# Patient Record
Sex: MALE | Race: WHITE | Employment: OTHER | URBAN - METROPOLITAN AREA
[De-identification: names, ages, dates, MRNs, and addresses within clinical notes are randomized per-mention and may not be internally consistent; named-entity substitution may affect disease eponyms.]

---

## 2017-11-20 ENCOUNTER — HOSPITAL ENCOUNTER (OUTPATIENT)
Dept: PREADMISSION TESTING | Age: 62
Discharge: HOME | End: 2017-11-20
Payer: COMMERCIAL

## 2017-11-20 VITALS
OXYGEN SATURATION: 98 % | WEIGHT: 253.31 LBS | HEIGHT: 73 IN | SYSTOLIC BLOOD PRESSURE: 165 MMHG | HEART RATE: 68 BPM | BODY MASS INDEX: 33.57 KG/M2 | DIASTOLIC BLOOD PRESSURE: 95 MMHG | TEMPERATURE: 98 F | RESPIRATION RATE: 20 BRPM

## 2017-11-20 LAB
ANION GAP SERPL CALC-SCNC: 9 MMOL/L (ref 5–15)
ATRIAL RATE: 60 BPM
BUN SERPL-MCNC: 10 MG/DL (ref 6–20)
BUN/CREAT SERPL: 11 (ref 12–20)
CALCIUM SERPL-MCNC: 9.6 MG/DL (ref 8.5–10.1)
CALCULATED P AXIS, ECG09: 55 DEGREES
CALCULATED R AXIS, ECG10: 11 DEGREES
CALCULATED T AXIS, ECG11: 13 DEGREES
CHLORIDE SERPL-SCNC: 103 MMOL/L (ref 97–108)
CO2 SERPL-SCNC: 28 MMOL/L (ref 21–32)
CREAT SERPL-MCNC: 0.91 MG/DL (ref 0.7–1.3)
DIAGNOSIS, 93000: NORMAL
GLUCOSE SERPL-MCNC: 93 MG/DL (ref 65–100)
P-R INTERVAL, ECG05: 142 MS
POTASSIUM SERPL-SCNC: 3.9 MMOL/L (ref 3.5–5.1)
Q-T INTERVAL, ECG07: 430 MS
QRS DURATION, ECG06: 98 MS
QTC CALCULATION (BEZET), ECG08: 430 MS
SODIUM SERPL-SCNC: 140 MMOL/L (ref 136–145)
VENTRICULAR RATE, ECG03: 60 BPM

## 2017-11-20 PROCEDURE — 36415 COLL VENOUS BLD VENIPUNCTURE: CPT | Performed by: ORTHOPAEDIC SURGERY

## 2017-11-20 PROCEDURE — 93005 ELECTROCARDIOGRAM TRACING: CPT

## 2017-11-20 PROCEDURE — 80048 BASIC METABOLIC PNL TOTAL CA: CPT | Performed by: ORTHOPAEDIC SURGERY

## 2017-11-20 RX ORDER — ASPIRIN 81 MG/1
81 TABLET ORAL DAILY
COMMUNITY
End: 2020-07-21

## 2017-11-20 NOTE — H&P
PAT Pre-Op History & Physical    Patient: Mercy Velez                  MRN: [id number]          SSN: [id number]  YOB: 1955          Age: 63 y.o. Sex: male                Subjective:     Patient is a 63 y.o.  male who presents with history of right knee pain that started September 2017. Patient is a  and was \"crab walking\" under his airplane to tie it down. He states when he emerged from under the plane his right knee did not \"feel right\". The pain continued to get worse and now rates his pain around a 4/10 most of the time. He describes the pain as aching. The pain is worse when he attempts to walk and has to take three steps before he feels like his knee will proceed  The patient was evaluated in the surgeon's office and it was determined that the most appropriate plan of care is to proceed with surgical intervention. Patient's PCP Darrius Mcguire MD            Past Medical History:   Diagnosis Date    Abnormal echocardiogram 10/28/2010    Gout     Lone atrial fibrillation ([address]     One epsodes many years ago    Obesity 10/28/2010    Supraventricular tachycardia ([address] 10/28/2010      Past Surgical History:   Procedure Laterality Date    CARDIAC SURG PROCEDURE UNLIST  2016    cardiac ablation    HX ACL RECONSTRUCTION  6/2013    menniscus    HX ROTATOR CUFF REPAIR Left 2005    HX ROTATOR CUFF REPAIR Right 2007    HX WISDOM TEETH EXTRACTION        Prior to Admission medications    Medication Sig Start Date End Date Taking? Authorizing Provider   aspirin delayed-release 81 mg tablet Take 81 mg by mouth daily. Yes Historical Provider   febuxostat (ULORIC) 80 mg tab tablet Take 80 mg by mouth daily. Historical Provider     Current Outpatient Prescriptions   Medication Sig    aspirin delayed-release 81 mg tablet Take 81 mg by mouth daily.  febuxostat (ULORIC) 80 mg tab tablet Take 80 mg by mouth daily.      No current facility-administered medications for this encounter. Allergies   Allergen Reactions    Percocet [Oxycodone-Acetaminophen] Other (comments)     Skin crawling      Social History   Substance Use Topics    Smoking status: Never Smoker    Smokeless tobacco: Never Used    Alcohol use 9.6 oz/week     16 Cans of beer per week      Comment: reports not drinking during the week, only on weekends      History   Drug Use No     Family History   Problem Relation Age of Onset    No Known Problems Mother     No Known Problems Father     Cancer Sister 43     breast    No Known Problems Sister          Review of Systems    Patient denies difficulty swallowing, mouth sores, or loose teeth. Patient denies any recent dental procedures or any planned prior to surgery. Patient denies chest pain, tightness, pain radiating down left arm, palpitations. Denies dizziness, visual disturbances, or lightheadedness. Patient denies shortness of breath, wheezing, cough, fever, or chills. Patient denies diarrhea, constipation, or abdominal pain. Patient denies urinary problems including dysuria, hesitancy, urgency, or incontinence. Reports numerous small areas on bilateral shins r/t dry skin. Objective:     Patient Vitals for the past 24 hrs:   Temp Pulse Resp BP SpO2   17 0931 - - - (!) 165/95 -   17 0911 98 °F (36.7 °C) 68 20 (!) 181/97 98 %     Patient gets very anxious prior to any lab draws or IV insertions. Temp (24hrs), Av °F (36.7 °C), Min:98 °F (36.7 °C), Max:98 °F (36.7 °C)    Body mass index is 33.42 kg/(m^2). Wt Readings from Last 1 Encounters:   17 114.9 kg (253 lb 4.9 oz)        Physical Exam:     General: Pleasant,  cooperative, no apparent distress, appears stated age. Eyes: Conjunctivae/corneas clear. EOMs intact. Nose: Nares normal.   Mouth/Throat: Lips, mucosa, and tongue normal. Teeth and gums normal.   Lungs: Clear to auscultation bilaterally.    Heart: Regular rate and rhythm, S1, S2 normal. No murmur, click, rub or gallop. Abdomen: Soft, non-tender. Bowel sounds normal. No distention. Musculoskeletal:  Gait antalgic   Extremities:  Extremities normal, atraumatic, no cyanosis or edema. Calves                                 supple, non tender to palpation. Pulses: 2+ and symmetric bilateral upper extremities. Cap. refill <2 seconds   Skin: Numerous dime size to smaller areas to left and right shin, scab present   Neurologic: CN II-XII grossly intact. Alert and oriented x3. Labs:   Recent Results (from the past 72 hour(s))   EKG, 12 LEAD, INITIAL    Collection Time: 11/20/17 10:16 AM   Result Value Ref Range    Ventricular Rate 60 BPM    Atrial Rate 60 BPM    P-R Interval 142 ms    QRS Duration 98 ms    Q-T Interval 430 ms    QTC Calculation (Bezet) 430 ms    Calculated P Axis 55 degrees    Calculated R Axis 11 degrees    Calculated T Axis 13 degrees    Diagnosis Normal sinus rhythm  Normal ECG          Assessment:     MEDIAL MENISCAL TEAR    Plan:     Scheduled for RIGHT KNEE ARTHROSCOPY, MEDIAL MENSICECTOMY .     BMP and EKG pending    Jaison Vasquez, NP

## 2017-11-20 NOTE — PERIOP NOTES
[facility]  PREOPERATIVE INSTRUCTIONS    Surgery Date:   Thursday 11/30/17      Surgery arrival time given by surgeon: NO  (If [facility] staff will call you between 3pm - 7pm the day before surgery with your arrival time. If your surgery is on a Monday, we will call you the preceding Friday. Please call [phone] after 7pm if you did not receive your arrival time.)  1. Report  to the 2nd Floor Admitting Desk on the day of your surgery. Bring your insurance card, photo identification, and any copayment (if applicable). 2. You must have a responsible adult to drive you home and stay with you the first 24 hours after surgery if you are going home the same day of your surgery. 3. Nothing to eat or drink after midnight the night before surgery. This means NO water, gum, mints, coffee, juice, etc.    4. MEDICATIONS TO TAKE THE MORNING OF SURGERY WITH A SIP OF WATER:uloric. Contact Dr. Tyron Bender concerning when to stop aspirin. 5. No alcoholic beverages 24 hours before and after your surgery. 6. If you are being admitted to the hospital,please leave personal belongings/luggage in your car until you have an assigned hospital room number. ( The hospital discharge time is 12 PM NOON. Your adult  should be at the hospital prior to the noon discharge time unless otherwise instructed.)   7. STOP Aspirin and/or any non-steroidal anti-inflammatory drugs (i.e. Ibuprofen, Naproxen, Advil, Aleve) as directed by your surgeon. You may take Tylenol. Stop herbal supplements 1 week prior to  surgery. 8. If you are currently taking Plavix, Coumadin,or any other blood-thinning/ anticoagulant medication contact your surgeon for instructions. 9. Wear comfortable clothes. Wear your glasses instead of contacts. Please leave all money, jewelry and valuables at home. No make up, particularly mascara, the day of surgery. 10.  REMOVE ALL body piercings, rings,and jewelry and leave at home.   Wear your hair loose or down, no pony-tails, buns, or any metal hair clips. 11. If you shower the morning of surgery, please do not apply any lotions, powders, or deodorants afterwards. Do not shave any body area within 24 hours of your surgery. 12. Please follow all instructions to avoid any potential surgical cancellation. 13. Should your physical condition change, (i.e. fever, cold, flu, etc.) please notify your surgeon as soon as possible. 14. It is important to be on time. If a situation occurs where you may be delayed, please call:  [phone] / [phone] on the day of surgery. 15. The Preadmission [facility] staff can be reached at [phone]. 16. Special instructions: free  parking 7am-5pm  17. The patient was contacted  in person. He  verbalize  understanding of all instructions does not  need reinforcement.

## 2017-11-28 PROBLEM — S83.241A ACUTE MEDIAL MENISCUS TEAR, RIGHT, INITIAL ENCOUNTER: Status: ACTIVE | Noted: 2017-11-28

## 2017-11-28 NOTE — DISCHARGE INSTRUCTIONS
DR. [place] POST OPERATIVE INTRUCTIONS   FOLLOWING ARTHROSCOPIC SURGERY OF KNEE    To continue your care at home, please follow these guidelines from Dr. Randall Kehr:    1. First meal at home should be a light meal then return to regular diet as tolerated. Drink plenty of liquids. 2. An Active Ice wrap will be provided for your knee. Re-freeze the wrap in your home freezer as necessary. Wear it for the first 2-3 days to decrease swelling and pain, then use as desired. 3. A compression stocking or large ace wrap will be applied to your leg postoperatively. This is to help prevent blood clots and control swelling. You may remove it to shower and do bandage changes, but continue use at all other times for 7 days, or until sutures are removed. 4. Elevate the surgical leg when sitting and sleeping to reduce swelling. DO NOT place a pillow directly under the knee; place it under your ankle. 5. Practice quadriceps muscle tightening, straight leg raise exercises, and ankle pumps several times each hour. Bend your knee as often as you need to for comfort. 6. Let pain be your guide to activity; too much pain, too much activity. 7. You will be given crutches postop if you do not have any already. Begin partial weight bearing as tolerated with 2 crutches, flat foot on the ground with each step. Continue crutch use for 7-10 days, or until cleared by physical therapy. 8. Prescriptions for pain and inflammation will be provided. Please begin medications as instructed as soon as you arrive home. Use Tylenol for headaches. Avoid ibuprofen and other NSAIDs 1 week prior to surgery. Please inform us of any known drug allergies. 9. You may shower 48 hours after surgery. Change band-aids after your daily shower. If you have steri-strips, DO NOT remove them until you are seen in the office.     10. You were given a follow up appointment date in 7-10 days for suture removal.  Appointment dates and times are located on the yellow POST-OP sheet. If you cannot remember the date/time, or you need to reschedule, please call the office. 11. DO NOT swim, take a tub bath or whirlpool, or immerse your wound until after your sutures are removed and a wound check is completed in the office. 12. If you develop a fever greater than 101°, or have unexpected redness, swelling, or drainage in your lower extremity, please call the office immediately. 13. REMINDER: Schedule your first physical therapy visit as instructed (yellow sheet). You will receive your physical therapy prescription the day of surgery. Remember to take your prescription with you to your first visit. 14. DRIVING: You may not drive within 48 hours of surgery. Left leg surgery, you may drive once you are off of pain medication. Right leg surgery, you may drive one week postop once you are off crutches and off of pain medication. DISCHARGE SUMMARY from your Nurse      PATIENT INSTRUCTIONS    After general anesthesia or intravenous sedation, for 24 hours or while taking prescription Narcotics:  · Limit your activities  · Do not drive and operate hazardous machinery  · Do not make important personal or business decisions  · Do  not drink alcoholic beverages  · If you have not urinated within 8 hours after discharge, please contact your surgeon on call. Report the following to your surgeon:  · Excessive pain, swelling, redness or odor of or around the surgical area  · Temperature over 100.5  · Nausea and vomiting lasting longer than 4 hours or if unable to take medications  · Any signs of decreased circulation or nerve impairment to extremity: change in color, persistent  numbness, tingling, coldness or increase pain  · Any questions      COUGH AND DEEP BREATHE    Breathing deeply and coughing are very important exercises to do after surgery. Deep breathing and coughing open the little air tubes and air sacks in your lungs. You take deep breaths every day. You may not even notice - it is just something you do when you sigh or yawn. It is a natural exercise you do to keep these air passages open. After surgery, take deep breaths and cough, on purpose. DIRECTIONS:  · Take 10 to 15 slow deep breaths every hour while awake. · Breathe in deeply, and hold it for 2 seconds. · Exhale slowly through puckered lips, like blowing up a balloon. · After every 4th or 5th deep breath, hug your pillow to your chest or belly and give a hard, deep cough. Yes, it will probably hurt. But doing this exercise is a very important part of healing after surgery. Take your pain medicine to help you do this exercise without too much pain. Coughing and deep breathing help prevent bronchitis and pneumonia after surgery. If you had chest or belly surgery, use a pillow as a \"hug lawrence\" and hold it tightly to your chest or belly when you cough. ANKLE PUMPS    Ankle pumps increase the circulation of oxygenated blood to your lower extremities and decrease your risk for circulation problems such as blood clots. They also stretch the muscles, tendons and ligaments in your foot and ankle, and prevent joint contracture in the ankle and foot, especially after surgeries on the legs. It is important to do ankle pump exercises regularly after surgery because immobility increases your risk for developing a blood clot. Your doctor may also have you take an Aspirin for the next few days as well. If your doctor did not ask you to take an Aspirin, consult with him before starting Aspirin therapy on your own. The exercise is quite simple. · Slowly point your foot forward, feeling the muscles on the top of your lower leg stretch, and hold this position for 5 seconds. · Next, pull your foot back toward you as far as possible, stretching the calf muscles, and hold that position for 5 seconds.                  · Repeat with the other foot. · Perform 10 repetitions every hour while awake for both ankles if possible (down and then up with the foot once is one repetition). You should feel gentle stretching of the muscles in your lower leg when doing this exercise. If you feel pain, or your range of motion is limited, don't push too hard. Only go the limit your joint and muscles will let you go. If you have increasing pain, progressively worsening leg warmth or swelling, STOP the exercise and call your doctor. These are general instructions for a healthy lifestyle:    *   Please give a list of your current medications to your Primary Care Provider. *   Please update this list whenever your medications are discontinued, doses are changed, or new medications (including over-the-counter products) are added. *   Please carry medication information at all times in case of emergency situations. About Smoking  No smoking / No tobacco products  Avoid exposure to second hand smoke     Surgeon General's Warning:  Quitting smoking now greatly reduces serious risk to your health. Obesity, smoking, and sedentary lifestyle greatly increases your risk for illness and disease. A healthy diet, regular physical exercise & weight monitoring are important for maintaining a healthy lifestyle. Congestive Heart Failure  You may be retaining fluid if you have a history of heart failure or if you experience any of the following symptoms:  Weight gain of 3 pounds or more overnight or 5 pounds in a week, increased swelling in your hands or feet or shortness of breath while lying flat in bed. Please call your doctor as soon as you notice any of these symptoms; do not wait until your next office visit.       Recognize signs and symptoms of STROKE:  F -  Face looks uneven  A -  Arms unable to move or move evenly  S -  Speech slurred or non-existent  T -  Time-call 911 as soon as signs and symptoms begin-DO NOT go          back to bed or wait to see if you get better-TIME IS BRAIN. Warning Signs of HEART ATTACK   Call 911 if you have these symptoms:     Chest discomfort. Most heart attacks involve discomfort in the center of the chest that lasts more than a few minutes, or that goes away and comes back. It can feel like uncomfortable pressure, squeezing, fullness, or pain.  Discomfort in other areas of the upper body. Symptoms can include pain or discomfort in one or both arms, the back, neck, jaw, or stomach.  Shortness of breath with or without chest discomfort.  Other signs may include breaking out in a cold sweat, nausea, or lightheadedness. Don't wait more than five minutes to call 911 - MINUTES MATTER! Fast action can save your life. Calling 911 is almost always the fastest way to get lifesaving treatment. Emergency Medical Services staff can begin treatment when they arrive -- up to an hour sooner than if someone gets to the hospital by car. The discharge information has been reviewed with the patient and caregiver. Any questions and concerns from the patient and caregiver have been addressed. The patient and caregiver verbalized understanding. Other information in your discharge envelope:  []     PRESCRIPTIONS  [x]     PHYSICAL THERAPY PRESCRIPTION  []     APPOINTMENT CARDS  []     [facility] Pamphlet for block or block with On-Q Catheter from   Anesthesia Service  []     Medical device information sheets/pamphlets from their    []     School/work excuse note. []     /parent work excuse note. The following personal items collected during your admission are returned to you:   Dental Appliance: Dental Appliances: None  Vision:    Hearing Aid:    Jewelry: Jewelry: None  Clothing: Clothing: Footwear, Undergarments, Pants, Shirt  Other Valuables:  Other Valuables: None  Valuables sent to safe:

## 2017-11-29 ENCOUNTER — ANESTHESIA EVENT (OUTPATIENT)
Dept: SURGERY | Age: 62
End: 2017-11-29
Payer: COMMERCIAL

## 2017-11-29 RX ORDER — NALOXONE HYDROCHLORIDE 0.4 MG/ML
0.2 INJECTION, SOLUTION INTRAMUSCULAR; INTRAVENOUS; SUBCUTANEOUS
Status: CANCELLED | OUTPATIENT
Start: 2017-11-29

## 2017-11-29 RX ORDER — SODIUM CHLORIDE 0.9 % (FLUSH) 0.9 %
5-10 SYRINGE (ML) INJECTION EVERY 8 HOURS
Status: CANCELLED | OUTPATIENT
Start: 2017-11-29

## 2017-11-29 RX ORDER — FLUMAZENIL 0.1 MG/ML
0.2 INJECTION INTRAVENOUS
Status: CANCELLED | OUTPATIENT
Start: 2017-11-29

## 2017-11-29 RX ORDER — SODIUM CHLORIDE, SODIUM LACTATE, POTASSIUM CHLORIDE, CALCIUM CHLORIDE 600; 310; 30; 20 MG/100ML; MG/100ML; MG/100ML; MG/100ML
125 INJECTION, SOLUTION INTRAVENOUS CONTINUOUS
Status: CANCELLED | OUTPATIENT
Start: 2017-11-29 | End: 2017-11-30

## 2017-11-29 RX ORDER — SODIUM CHLORIDE 0.9 % (FLUSH) 0.9 %
5-10 SYRINGE (ML) INJECTION AS NEEDED
Status: CANCELLED | OUTPATIENT
Start: 2017-11-29

## 2017-11-29 RX ORDER — LIDOCAINE HYDROCHLORIDE 10 MG/ML
0.1 INJECTION, SOLUTION EPIDURAL; INFILTRATION; INTRACAUDAL; PERINEURAL AS NEEDED
Status: CANCELLED | OUTPATIENT
Start: 2017-11-29

## 2017-11-30 ENCOUNTER — ANESTHESIA (OUTPATIENT)
Dept: SURGERY | Age: 62
End: 2017-11-30
Payer: COMMERCIAL

## 2017-11-30 ENCOUNTER — HOSPITAL ENCOUNTER (OUTPATIENT)
Age: 62
Setting detail: OUTPATIENT SURGERY
Discharge: HOME | End: 2017-11-30
Attending: ORTHOPAEDIC SURGERY | Admitting: ORTHOPAEDIC SURGERY
Payer: COMMERCIAL

## 2017-11-30 VITALS
BODY MASS INDEX: 33.1 KG/M2 | DIASTOLIC BLOOD PRESSURE: 85 MMHG | OXYGEN SATURATION: 96 % | HEART RATE: 64 BPM | TEMPERATURE: 97.4 F | WEIGHT: 249.78 LBS | HEIGHT: 73 IN | RESPIRATION RATE: 11 BRPM | SYSTOLIC BLOOD PRESSURE: 117 MMHG

## 2017-11-30 PROCEDURE — 77030008495 HC TBNG ARTHSC IRR CNMD -B: Performed by: ORTHOPAEDIC SURGERY

## 2017-11-30 PROCEDURE — 77030006877 HC BLD SHV FLL RAD S&N -B: Performed by: ORTHOPAEDIC SURGERY

## 2017-11-30 PROCEDURE — 77030002916 HC SUT ETHLN J&J -A: Performed by: ORTHOPAEDIC SURGERY

## 2017-11-30 PROCEDURE — 76060000061 HC AMB SURG ANES 0.5 TO 1 HR: Performed by: ORTHOPAEDIC SURGERY

## 2017-11-30 PROCEDURE — 77030012893

## 2017-11-30 PROCEDURE — 77030011640 HC PAD GRND REM COVD -A: Performed by: ORTHOPAEDIC SURGERY

## 2017-11-30 PROCEDURE — 74011250636 HC RX REV CODE- 250/636: Performed by: ORTHOPAEDIC SURGERY

## 2017-11-30 PROCEDURE — 77030000032 HC CUF TRNQT ZIMM -B: Performed by: ORTHOPAEDIC SURGERY

## 2017-11-30 PROCEDURE — 74011250637 HC RX REV CODE- 250/637: Performed by: ORTHOPAEDIC SURGERY

## 2017-11-30 PROCEDURE — 74011250636 HC RX REV CODE- 250/636

## 2017-11-30 PROCEDURE — 76210000057 HC AMBSU PH II REC 1 TO 1.5 HR: Performed by: ORTHOPAEDIC SURGERY

## 2017-11-30 PROCEDURE — 77030018835 HC SOL IRR LR ICUM -A: Performed by: ORTHOPAEDIC SURGERY

## 2017-11-30 PROCEDURE — 74011000250 HC RX REV CODE- 250: Performed by: ORTHOPAEDIC SURGERY

## 2017-11-30 PROCEDURE — 74011000250 HC RX REV CODE- 250

## 2017-11-30 PROCEDURE — 76030000000 HC AMB SURG OR TIME 0.5 TO 1: Performed by: ORTHOPAEDIC SURGERY

## 2017-11-30 PROCEDURE — 77030028907 HC WRP KNEE WO BGS SOLM -B

## 2017-11-30 PROCEDURE — 77030020782 HC GWN BAIR PAWS FLX 3M -B

## 2017-11-30 PROCEDURE — 76210000040 HC AMBSU PH I REC FIRST 0.5 HR: Performed by: ORTHOPAEDIC SURGERY

## 2017-11-30 PROCEDURE — 77030020143 HC AIRWY LARYN INTUB CGAS -A: Performed by: ANESTHESIOLOGY

## 2017-11-30 RX ORDER — CEFAZOLIN SODIUM IN 0.9 % NACL 2 G/50 ML
2 INTRAVENOUS SOLUTION, PIGGYBACK (ML) INTRAVENOUS ONCE
Status: COMPLETED | OUTPATIENT
Start: 2017-11-30 | End: 2017-11-30

## 2017-11-30 RX ORDER — MIDAZOLAM HYDROCHLORIDE 1 MG/ML
INJECTION, SOLUTION INTRAMUSCULAR; INTRAVENOUS AS NEEDED
Status: DISCONTINUED | OUTPATIENT
Start: 2017-11-30 | End: 2017-11-30 | Stop reason: HOSPADM

## 2017-11-30 RX ORDER — METHYLPREDNISOLONE ACETATE 40 MG/ML
INJECTION, SUSPENSION INTRA-ARTICULAR; INTRALESIONAL; INTRAMUSCULAR; SOFT TISSUE AS NEEDED
Status: DISCONTINUED | OUTPATIENT
Start: 2017-11-30 | End: 2017-11-30 | Stop reason: HOSPADM

## 2017-11-30 RX ORDER — FENTANYL CITRATE 50 UG/ML
INJECTION, SOLUTION INTRAMUSCULAR; INTRAVENOUS AS NEEDED
Status: DISCONTINUED | OUTPATIENT
Start: 2017-11-30 | End: 2017-11-30 | Stop reason: HOSPADM

## 2017-11-30 RX ORDER — SODIUM CHLORIDE 0.9 % (FLUSH) 0.9 %
5-10 SYRINGE (ML) INJECTION AS NEEDED
Status: DISCONTINUED | OUTPATIENT
Start: 2017-11-30 | End: 2017-11-30 | Stop reason: HOSPADM

## 2017-11-30 RX ORDER — CEFAZOLIN SODIUM IN 0.9 % NACL 2 G/50 ML
INTRAVENOUS SOLUTION, PIGGYBACK (ML) INTRAVENOUS
Status: COMPLETED
Start: 2017-11-30 | End: 2017-11-30

## 2017-11-30 RX ORDER — LIDOCAINE HYDROCHLORIDE 20 MG/ML
INJECTION, SOLUTION EPIDURAL; INFILTRATION; INTRACAUDAL; PERINEURAL AS NEEDED
Status: DISCONTINUED | OUTPATIENT
Start: 2017-11-30 | End: 2017-11-30 | Stop reason: HOSPADM

## 2017-11-30 RX ORDER — DEXAMETHASONE SODIUM PHOSPHATE 4 MG/ML
INJECTION, SOLUTION INTRA-ARTICULAR; INTRALESIONAL; INTRAMUSCULAR; INTRAVENOUS; SOFT TISSUE AS NEEDED
Status: DISCONTINUED | OUTPATIENT
Start: 2017-11-30 | End: 2017-11-30 | Stop reason: HOSPADM

## 2017-11-30 RX ORDER — KETOROLAC TROMETHAMINE 30 MG/ML
INJECTION, SOLUTION INTRAMUSCULAR; INTRAVENOUS AS NEEDED
Status: DISCONTINUED | OUTPATIENT
Start: 2017-11-30 | End: 2017-11-30 | Stop reason: HOSPADM

## 2017-11-30 RX ORDER — SODIUM CHLORIDE, SODIUM LACTATE, POTASSIUM CHLORIDE, CALCIUM CHLORIDE 600; 310; 30; 20 MG/100ML; MG/100ML; MG/100ML; MG/100ML
INJECTION, SOLUTION INTRAVENOUS
Status: DISCONTINUED | OUTPATIENT
Start: 2017-11-30 | End: 2017-11-30 | Stop reason: HOSPADM

## 2017-11-30 RX ORDER — DIPHENHYDRAMINE HYDROCHLORIDE 50 MG/ML
12.5 INJECTION, SOLUTION INTRAMUSCULAR; INTRAVENOUS AS NEEDED
Status: DISCONTINUED | OUTPATIENT
Start: 2017-11-30 | End: 2017-11-30 | Stop reason: HOSPADM

## 2017-11-30 RX ORDER — HYDROMORPHONE HYDROCHLORIDE 1 MG/ML
.25-1 INJECTION, SOLUTION INTRAMUSCULAR; INTRAVENOUS; SUBCUTANEOUS
Status: DISCONTINUED | OUTPATIENT
Start: 2017-11-30 | End: 2017-11-30 | Stop reason: HOSPADM

## 2017-11-30 RX ORDER — SODIUM CHLORIDE, SODIUM LACTATE, POTASSIUM CHLORIDE, CALCIUM CHLORIDE 600; 310; 30; 20 MG/100ML; MG/100ML; MG/100ML; MG/100ML
125 INJECTION, SOLUTION INTRAVENOUS CONTINUOUS
Status: DISCONTINUED | OUTPATIENT
Start: 2017-11-30 | End: 2017-11-30 | Stop reason: HOSPADM

## 2017-11-30 RX ORDER — ONDANSETRON 2 MG/ML
INJECTION INTRAMUSCULAR; INTRAVENOUS AS NEEDED
Status: DISCONTINUED | OUTPATIENT
Start: 2017-11-30 | End: 2017-11-30 | Stop reason: HOSPADM

## 2017-11-30 RX ORDER — PROPOFOL 10 MG/ML
INJECTION, EMULSION INTRAVENOUS AS NEEDED
Status: DISCONTINUED | OUTPATIENT
Start: 2017-11-30 | End: 2017-11-30 | Stop reason: HOSPADM

## 2017-11-30 RX ORDER — HYDROCODONE BITARTRATE AND ACETAMINOPHEN 5; 325 MG/1; MG/1
1 TABLET ORAL ONCE
Status: COMPLETED | OUTPATIENT
Start: 2017-11-30 | End: 2017-11-30

## 2017-11-30 RX ADMIN — ONDANSETRON 4 MG: 2 INJECTION INTRAMUSCULAR; INTRAVENOUS at 08:42

## 2017-11-30 RX ADMIN — PROPOFOL 150 MG: 10 INJECTION, EMULSION INTRAVENOUS at 08:35

## 2017-11-30 RX ADMIN — KETOROLAC TROMETHAMINE 30 MG: 30 INJECTION, SOLUTION INTRAMUSCULAR; INTRAVENOUS at 09:08

## 2017-11-30 RX ADMIN — FENTANYL CITRATE 50 MCG: 50 INJECTION, SOLUTION INTRAMUSCULAR; INTRAVENOUS at 08:43

## 2017-11-30 RX ADMIN — MIDAZOLAM HYDROCHLORIDE 2 MG: 1 INJECTION, SOLUTION INTRAMUSCULAR; INTRAVENOUS at 08:26

## 2017-11-30 RX ADMIN — FENTANYL CITRATE 25 MCG: 50 INJECTION, SOLUTION INTRAMUSCULAR; INTRAVENOUS at 09:04

## 2017-11-30 RX ADMIN — CEFAZOLIN 2 G: 1 INJECTION, POWDER, FOR SOLUTION INTRAMUSCULAR; INTRAVENOUS; PARENTERAL at 08:26

## 2017-11-30 RX ADMIN — SODIUM CHLORIDE, SODIUM LACTATE, POTASSIUM CHLORIDE, CALCIUM CHLORIDE: 600; 310; 30; 20 INJECTION, SOLUTION INTRAVENOUS at 08:26

## 2017-11-30 RX ADMIN — HYDROCODONE BITARTRATE AND ACETAMINOPHEN 1 TABLET: 5; 325 TABLET ORAL at 10:18

## 2017-11-30 RX ADMIN — FENTANYL CITRATE 25 MCG: 50 INJECTION, SOLUTION INTRAMUSCULAR; INTRAVENOUS at 08:57

## 2017-11-30 RX ADMIN — DEXAMETHASONE SODIUM PHOSPHATE 8 MG: 4 INJECTION, SOLUTION INTRA-ARTICULAR; INTRALESIONAL; INTRAMUSCULAR; INTRAVENOUS; SOFT TISSUE at 08:45

## 2017-11-30 RX ADMIN — LIDOCAINE HYDROCHLORIDE 60 MG: 20 INJECTION, SOLUTION EPIDURAL; INFILTRATION; INTRACAUDAL; PERINEURAL at 08:35

## 2017-11-30 NOTE — IP AVS SNAPSHOT
[address] 
[phone] Patient: Yaw Erickson MRN: [id number] BSV:[id number] About your hospitalization You were admitted on:  November 30, 2017 You last received care in the:  [facility] You were discharged on:  November 30, 2017 Why you were hospitalized Your primary diagnosis was:  Not on File Your diagnoses also included:  Acute Medial Meniscus Tear, Right, Initial Encounter Things You Need To Do (next 8 weeks) Follow up with Selin Summers MD  
  
Phone:  [phone] Where:  [address] [place] [address] Discharge Orders None A check horace indicates which time of day the medication should be taken. My Medications TAKE these medications as instructed Instructions Each Dose to Equal  
 Morning Noon Evening Bedtime  
 aspirin delayed-release 81 mg tablet Your last dose was: Your next dose is: Take 81 mg by mouth daily. 81 mg  
    
   
   
   
  
 febuxostat 80 mg Tab tablet Commonly known as:  Erenest Dimmer Your last dose was: Your next dose is: Take 80 mg by mouth daily. 80 mg Discharge Instructions DR. RAMIREZ POST OPERATIVE INTRUCTIONS FOLLOWING ARTHROSCOPIC SURGERY OF KNEE To continue your care at home, please follow these guidelines from Dr. Laith Moreland: 
 
1. First meal at home should be a light meal then return to regular diet as tolerated. Drink plenty of liquids. 2. An Active Ice wrap will be provided for your knee. Re-freeze the wrap in your home freezer as necessary. Wear it for the first 2-3 days to decrease swelling and pain, then use as desired. 3. A compression stocking or large ace wrap will be applied to your leg postoperatively.   This is to help prevent blood clots and control swelling. You may remove it to shower and do bandage changes, but continue use at all other times for 7 days, or until sutures are removed. 4. Elevate the surgical leg when sitting and sleeping to reduce swelling. DO NOT place a pillow directly under the knee; place it under your ankle. 5. Practice quadriceps muscle tightening, straight leg raise exercises, and ankle pumps several times each hour. Bend your knee as often as you need to for comfort. 6. Let pain be your guide to activity; too much pain, too much activity. 7. You will be given crutches postop if you do not have any already. Begin partial weight bearing as tolerated with 2 crutches, flat foot on the ground with each step. Continue crutch use for 7-10 days, or until cleared by physical therapy. 8. Prescriptions for pain and inflammation will be provided. Please begin medications as instructed as soon as you arrive home. Use Tylenol for headaches. Avoid ibuprofen and other NSAIDs 1 week prior to surgery. Please inform us of any known drug allergies. 9. You may shower 48 hours after surgery. Change band-aids after your daily shower. If you have steri-strips, DO NOT remove them until you are seen in the office. 10. You were given a follow up appointment date in 7-10 days for suture removal.  Appointment dates and times are located on the yellow POST-OP sheet. If you cannot remember the date/time, or you need to reschedule, please call the office. 11. DO NOT swim, take a tub bath or whirlpool, or immerse your wound until after your sutures are removed and a wound check is completed in the office. 12. If you develop a fever greater than 101°, or have unexpected redness, swelling, or drainage in your lower extremity, please call the office immediately. 13. REMINDER: Schedule your first physical therapy visit as instructed (yellow sheet).   You will receive your physical therapy prescription the day of surgery. Remember to take your prescription with you to your first visit. 14. DRIVING: You may not drive within 48 hours of surgery. Left leg surgery, you may drive once you are off of pain medication. Right leg surgery, you may drive one week postop once you are off crutches and off of pain medication. DISCHARGE SUMMARY from your Nurse PATIENT INSTRUCTIONS After general anesthesia or intravenous sedation, for 24 hours or while taking prescription Narcotics: · Limit your activities · Do not drive and operate hazardous machinery · Do not make important personal or business decisions · Do  not drink alcoholic beverages · If you have not urinated within 8 hours after discharge, please contact your surgeon on call. Report the following to your surgeon: 
· Excessive pain, swelling, redness or odor of or around the surgical area · Temperature over 100.5 · Nausea and vomiting lasting longer than 4 hours or if unable to take medications · Any signs of decreased circulation or nerve impairment to extremity: change in color, persistent  numbness, tingling, coldness or increase pain · Any questions [address] Breathing deeply and coughing are very important exercises to do after surgery. Deep breathing and coughing open the little air tubes and air sacks in your lungs. You take deep breaths every day. You may not even notice - it is just something you do when you sigh or yawn. It is a natural exercise you do to keep these air passages open. After surgery, take deep breaths and cough, on purpose. DIRECTIONS: 
· Take 10 to 15 slow deep breaths every hour while awake. · Breathe in deeply, and hold it for 2 seconds. · Exhale slowly through puckered lips, like blowing up a balloon. · After every 4th or 5th deep breath, hug your pillow to your chest or belly and give a hard, deep cough. Yes, it will probably hurt. But doing this exercise is a very important part of healing after surgery. Take your pain medicine to help you do this exercise without too much pain. Coughing and deep breathing help prevent bronchitis and pneumonia after surgery. If you had chest or belly surgery, use a pillow as a \"hug lawrence\" and hold it tightly to your chest or belly when you cough. ANKLE PUMPS Ankle pumps increase the circulation of oxygenated blood to your lower extremities and decrease your risk for circulation problems such as blood clots. They also stretch the muscles, tendons and ligaments in your foot and ankle, and prevent joint contracture in the ankle and foot, especially after surgeries on the legs. It is important to do ankle pump exercises regularly after surgery because immobility increases your risk for developing a blood clot. Your doctor may also have you take an Aspirin for the next few days as well. If your doctor did not ask you to take an Aspirin, consult with him before starting Aspirin therapy on your own. The exercise is quite simple. · Slowly point your foot forward, feeling the muscles on the top of your lower leg stretch, and hold this position for 5 seconds. · Next, pull your foot back toward you as far as possible, stretching the calf muscles, and hold that position for 5 seconds. · Repeat with the other foot. · Perform 10 repetitions every hour while awake for both ankles if possible (down and then up with the foot once is one repetition). You should feel gentle stretching of the muscles in your lower leg when doing this exercise. If you feel pain, or your range of motion is limited, don't push too hard. Only go the limit your joint and muscles will let you go. If you have increasing pain, progressively worsening leg warmth or swelling, STOP the exercise and call your doctor. These are general instructions for a healthy lifestyle: *   Please give a list of your current medications to your Primary Care Provider. *   Please update this list whenever your medications are discontinued, doses are changed, or new medications (including over-the-counter products) are added. *   Please carry medication information at all times in case of emergency situations. About Smoking No smoking / No tobacco products Avoid exposure to second hand smoke Surgeon General's Warning:  Quitting smoking now greatly reduces serious risk to your health. Obesity, smoking, and sedentary lifestyle greatly increases your risk for illness and disease. A healthy diet, regular physical exercise & weight monitoring are important for maintaining a healthy lifestyle. Congestive Heart Failure You may be retaining fluid if you have a history of heart failure or if you experience any of the following symptoms:  Weight gain of 3 pounds or more overnight or 5 pounds in a week, increased swelling in your hands or feet or shortness of breath while lying flat in bed. Please call your doctor as soon as you notice any of these symptoms; do not wait until your next office visit. Recognize signs and symptoms of STROKE: 
F -  Face looks uneven A -  Arms unable to move or move evenly S -  Speech slurred or non-existent T -  Time-call 911 as soon as signs and symptoms begin-DO NOT go  
       back to bed or wait to see if you get better-TIME IS BRAIN. Warning Signs of HEART ATTACK Call 911 if you have these symptoms: 
 
? Chest discomfort. Most heart attacks involve discomfort in the center of the chest that lasts more than a few minutes, or that goes away and comes back. It can feel like uncomfortable pressure, squeezing, fullness, or pain. ? Discomfort in other areas of the upper body. Symptoms can include pain or discomfort in one or both arms, the back, neck, jaw, or stomach. ? Shortness of breath with or without chest discomfort. ? Other signs may include breaking out in a cold sweat, nausea, or lightheadedness. Don't wait more than five minutes to call [address]! Fast action can save your life. Calling 911 is almost always the fastest way to get lifesaving treatment. Emergency Medical Services staff can begin treatment when they arrive  up to an hour sooner than if someone gets to the hospital by car. The discharge information has been reviewed with the patient and caregiver. Any questions and concerns from the patient and caregiver have been addressed. The patient and caregiver verbalized understanding. Other information in your discharge envelope: PRESCRIPTIONS PHYSICAL THERAPY PRESCRIPTION 
     APPOINTMENT CARDS [facility] Pamphlet for block or block with On-Q Catheter from   Anesthesia Service Medical device information sheets/pamphlets from their  School/work excuse note. /parent work excuse note. The following personal items collected during your admission are returned to you:  
Dental Appliance: Dental Appliances: None Vision:   
Hearing Aid:   
Jewelry: Jewelry: None Clothing: Clothing: Footwear, Undergarments, Pants, Shirt Other Valuables: Other Valuables: None Valuables sent to safe:   
 
 
 
 
  
  
  
Introducing [facility]! Clair Camarena introduces [employer] patient portal. Now you can access parts of your medical record, email your doctor's office, and request medication refills online. 1. In your internet browser, go to [email or web address] 2. Click on the First Time User? Click Here link in the Sign In box. You will see the New Member Sign Up page. 3. Enter your [employer] Access Code exactly as it appears below. You will not need to use this code after youve completed the sign-up process.  If you do not sign up before the expiration date, you must request a new code. · [employer] Access Code: [email or web address] Expires: 2/18/2018  8:51 AM 
 
4. Enter the last four digits of your Social Security Number (xxxx) and Date of Birth (mm/dd/yyyy) as indicated and click Submit. You will be taken to the next sign-up page. 5. Create a [employer] ID. This will be your [employer] login ID and cannot be changed, so think of one that is secure and easy to remember. 6. Create a [employer] password. You can change your password at any time. 7. Enter your Password Reset Question and Answer. This can be used at a later time if you forget your password. 8. Enter your e-mail address. You will receive e-mail notification when new information is available in [address] 9. Click Sign Up. You can now view and download portions of your medical record. 10. Click the Download Summary menu link to download a portable copy of your medical information. If you have questions, please visit the Frequently Asked Questions section of the [employer] website. Remember, [employer] is NOT to be used for urgent needs. For medical emergencies, dial 911. Now available from your iPhone and Android! Providers Seen During Your Hospitalization Provider Specialty Primary office phone Margarita Carty MD Orthopedic Surgery [phone] Your Primary Care Physician (PCP) Primary Care Physician Office Phone Office Fax Evelio LEMONS [phone] You are allergic to the following Allergen Reactions Percocet (Oxycodone-Acetaminophen) Other (comments) Skin crawling Recent Documentation Height Weight BMI Smoking Status 1.854 m 113.3 kg 32.95 kg/m2 Never Smoker Emergency Contacts Name Discharge Info Relation Home Work [facility] PAR DISCHARGE CAREGIVER [3] Sister [23] [phone] Patient Belongings The following personal items are in your possession at time of discharge: 
  Dental Appliances: None         Home Medications: None   Jewelry: None  Clothing: Footwear, Undergarments, Pants, Shirt    Other Valuables: None Please provide this summary of care documentation to your next provider. Signatures-by signing, you are acknowledging that this After Visit Summary has been reviewed with you and you have received a copy. Patient Signature:  ____________________________________________________________ Date:  ____________________________________________________________  
  
[facility] Provider Signature:  ____________________________________________________________ Date:  ____________________________________________________________

## 2017-11-30 NOTE — BRIEF OP NOTE
BRIEF OPERATIVE NOTE    Date of Procedure: 11/30/2017   Preoperative Diagnosis: RIGHT KNEE MEDIAL MENISCAL TEAR  Postoperative Diagnosis: RIGHT KNEE POSTERIOR HORN MEDIAL MENISCAL TEAR, EARLY DJD    Procedure(s):  RIGHT KNEE ARTHROSCOPY, PARTIAL  MEDIAL MENSICECTOMY  Surgeon(s) and Role:     * Les Toussaint MD - Primary         Assistant Staff:       Surgical Staff:  Circ-1: Neelam Rae RN  Scrub Tech-1: Mumtaz Franco  Float Staff: Shahrzad Bui RN  Event Time In   Incision Start 1253   Incision Close 0900     Anesthesia: General   Estimated Blood Loss: less than 15 cc  Specimens: * No specimens in log *   Findings: posterior horn complex  MFC grade 2 chondrosis   Complications: none  Implants: * No implants in log *

## 2017-11-30 NOTE — ANESTHESIA POSTPROCEDURE EVALUATION
Post-Anesthesia Evaluation and Assessment    Patient: Marylen Bath MRN: [id number]  SSN: [id number]    YOB: 1955  Age: 63 y.o. Sex: male       Cardiovascular Function/Vital Signs  Visit Vitals    /74    Pulse 66    Temp 36.3 °C (97.4 °F)    Resp 12    Ht 6' 1\" (1.854 m)    Wt 113.3 kg (249 lb 12.5 oz)    SpO2 95%    BMI 32.95 kg/m2       Patient is status post general anesthesia for Procedure(s):  RIGHT KNEE ARTHROSCOPY, PARTIAL  MEDIAL MENSICECTOMY. Nausea/Vomiting: None    Postoperative hydration reviewed and adequate. Pain:  Pain Scale 1: Numeric (0 - 10) (11/30/17 0936)  Pain Intensity 1: 2 (11/30/17 0936)   Managed    Neurological Status:   Neuro (WDL): Exceptions to [facility] (11/30/17 2831)  Neuro  Neurologic State: Drowsy; Eyes open spontaneously (11/30/17 0914)  LUE Motor Response: Purposeful (11/30/17 0914)  LLE Motor Response: Purposeful (11/30/17 0914)  RUE Motor Response: Purposeful (11/30/17 0914)  RLE Motor Response: Purposeful (11/30/17 0914)   At baseline    Mental Status and Level of Consciousness: Arousable    Pulmonary Status:   O2 Device: Room air (11/30/17 0929)   Adequate oxygenation and airway patent    Complications related to anesthesia: None    Post-anesthesia assessment completed.  No concerns    Signed By: Thalia Maria MD     November 30, 2017

## 2017-11-30 NOTE — ANESTHESIA PREPROCEDURE EVALUATION
Anesthetic History   No history of anesthetic complications            Review of Systems / Medical History  Patient summary reviewed, nursing notes reviewed and pertinent labs reviewed    Pulmonary  Within defined limits                 Neuro/Psych   Within defined limits           Cardiovascular  Within defined limits          Dysrhythmias : atrial fibrillation and SVT      Exercise tolerance: >4 METS     GI/Hepatic/Renal  Within defined limits              Endo/Other  Within defined limits      Obesity     Other Findings   Comments: gout           Physical Exam    Airway  Mallampati: II    Neck ROM: normal range of motion   Mouth opening: Normal     Cardiovascular  Regular rate and rhythm,  S1 and S2 normal,  no murmur, click, rub, or gallop  Rhythm: regular  Rate: normal         Dental  No notable dental hx       Pulmonary  Breath sounds clear to auscultation               Abdominal  GI exam deferred       Other Findings            Anesthetic Plan    ASA: 2  Anesthesia type: general          Induction: Intravenous  Anesthetic plan and risks discussed with: Patient

## 2017-11-30 NOTE — OP NOTES
[facility]   [address] [facility], [address]   OP NOTE       Name:  Benoit Marshall   MR#:  [id number]   :  1955   Account #:  [de-identified]    Surgery Date:  2017   Date of Adm:  2017       PREOPERATIVE DIAGNOSIS: Right knee medial meniscus tear. POSTOPERATIVE DIAGNOSES   1. Posterior horn complex tear, medial meniscus. 2. 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 PROCEDURE PERFORMED:     SURGEON: Zoey Webster MD    ASSISTANT: No assistant. ANESTHESIA: General.    ESTIMATED BLOOD LOSS: less than 10 degrees    SPECIMENS REMOVED: none    COMPLICATIONS:     DESCRIPTION OF PROCEDURE: The patient was placed on the   table in supine position, prepped and draped in the usual sterile   manner. Scope placed in the inferolateral portal, revealed the   patellofemoral surface that had some minimal grade I chondromalacia. No acute changes, we left that alone. Within the lateral joint, again   some grade I softening in the femoral condyle, but no meniscal tear   and no arthritis. The intercondylar notch showed some fraying of the   ACL, but still intact. The PCL intact. There was hypertrophic fat pad   throughout the intercondylar notch that we debrided. Once the fat pad   was gone, we got a good look at the medial joint. He had some grade   II chondrosis throughout the medial femoral condyle. No acute injury   there. The tibial plateau was smooth. The patient did have a posterior   horn inner third complex tear, about 50% of the posterior horn. We   performed partial medial meniscectomy, debrided to a stable rim,   irrigated copiously and closed the wounds. The patient went to the   recovery room in good condition. There were no complications.         MD Tonny Gonzalez / Arnulfo Maradiaga   D:  2017   09:12   T:  2017   12:24   Job #:  [id number]

## 2020-07-21 ENCOUNTER — VIRTUAL VISIT (OUTPATIENT)
Dept: CARDIOLOGY CLINIC | Age: 65
End: 2020-07-21

## 2020-07-21 DIAGNOSIS — I48.92 ATRIAL FLUTTER, UNSPECIFIED TYPE (HCC): Primary | ICD-10-CM

## 2020-07-21 DIAGNOSIS — I10 ESSENTIAL HYPERTENSION: ICD-10-CM

## 2020-07-21 RX ORDER — ALLOPURINOL 100 MG/1
100 TABLET ORAL DAILY
COMMUNITY

## 2020-07-21 RX ORDER — DILTIAZEM HYDROCHLORIDE 120 MG/1
120 CAPSULE, COATED, EXTENDED RELEASE ORAL DAILY
Qty: 30 CAP | Refills: 12 | Status: SHIPPED | OUTPATIENT
Start: 2020-07-21 | End: 2020-12-22

## 2020-07-21 NOTE — PROGRESS NOTES
Last Thursday, PCP had his stop Uloric 80 mg and start allopurinol. Pt stated that he started feeling like \"heart has added a beat. \" Feels the beats when laying down/at rest.  Feels better upon exerting. Denies SOB. Can feel heart; no chest pain. \"Something is different. \"    Irregular heart beat? Pulse right now of 88. Saturday went to pool, drinking, went to party (a lot of alcohol consumption). The extra beats have been coming and going over the weekend. Let patient know that we may bring him into the office for an EKG, but would do VV today to start off with. Reviewed PCP, allergens, meds, pharmacy.   Requested records from PCP

## 2020-07-21 NOTE — PROGRESS NOTES
Bahrathi Arias MD. [facility]  [address] [facility] [address] [facility], [address] [place] [address] [phone]; Fax [phone]        Patient: Nick Coronel  : 1955      Today's Date: 2020        [facility] Cardiology Telemedicine Encounter                                                         Pursuant to the emergency declaration under the [address] [place] [address] waiver authority and the [employer] Act, this Virtual  Visit was conducted, with patient's consent, to reduce the patient's risk of exposure to COVID-19 and provide continuity of care for an established patient. Services were provided through a video synchronous discussion virtually to substitute for in-person clinic visit. HISTORY OF PRESENT ILLNESS:     History of Present Illness:    Nick Coronel is a 63 y.o. male who was seen by synchronous (real-time) audio-video technology on 2020. Mr. Cliff Layton has a history of atrial flutter and last saw cardiology 4 years ago. Records reviewed and updated below. Went to a part on Saturday and had some alcohol and had some problems with his HR -- seemed to get better and then came and went -- now it feels normal.  No SOB or dizziness. A slight 1-2/10 heart burn. Heart goes rapid and seems like atrial flutter he had before. BP has been fair 130/90 last week - a little higher today 150/100.           PAST MEDICAL HISTORY:     Past Medical History:   Diagnosis Date    Abnormal echocardiogram 10/28/2010    Atrial flutter ([address]     s/p ablation     Dyslipidemia     Gout     Obesity 10/28/2010    Prostate cancer ([address]     S/P ablation of atrial flutter     Supraventricular tachycardia ([address] 10/28/2010           Past Surgical History:   Procedure Laterality Date    CARDIAC SURG PROCEDURE UNLIST  2016    cardiac ablation    HX ACL RECONSTRUCTION  2013    menniscus    HX ROTATOR CUFF REPAIR Left 2005    HX ROTATOR CUFF REPAIR Right 2007    HX WISDOM TEETH EXTRACTION             Patient Active Problem List   Diagnosis Code    Obesity E66.9    A-fib ([facility]) I48.91    Leukocytosis D72.829    Acute medial meniscus tear, right, initial encounter S83.241A    S/P ablation of atrial flutter Z98.890, Z86.79    Atrial flutter ([facility]) I48.92             MEDICATIONS:     Current Outpatient Medications   Medication Sig Dispense Refill    allopurinoL (ZYLOPRIM) 100 mg tablet Take 100 mg by mouth daily. Switched from [address] [place] [address] to allopurinol on 7/17/2020             Allergies   Allergen Reactions    Percocet [Oxycodone-Acetaminophen] Itching     Pt has used hydrocodone               SOCIAL HISTORY:     Social History     Tobacco Use    Smoking status: Never Smoker    Smokeless tobacco: Never Used   Substance Use Topics    Alcohol use: Yes     Alcohol/week: 16.0 standard drinks     Types: 16 Cans of beer per week     Comment: reports not drinking during the week, only on weekends    Drug use: No               FAMILY HISTORY:     Family History   Problem Relation Age of Onset    No Known Problems Mother     No Known Problems Father     Cancer Sister 43        breast    No Known Problems Sister                REVIEW OF SYMPTOMS:     Review of Symptoms:  Constitutional: Negative for fever, chills  HEENT: Negative for nosebleeds, vision changes. Respiratory: Negative for cough, wheezing  Cardiovascular: Negative for claudication, syncope  Gastrointestinal: Negative for abdominal pain, diarrhea, melena. Genitourinary: Negative for dysuria  Musculoskeletal: Negative for myalgias. Skin: Negative for rash  Heme: No problems bleeding. Neurological: Negative for speech change and focal weakness. PHYSICAL EXAM:       Physical Exam:    Due to this being a TeleHealth evaluation, many elements of the physical examination are unable to be assessed.      General: Well developed, in no acute distress, cooperative and alert  HEENT: Hearing intact, non-icteric, normocephalic, atraumatic. Pupils equal/round. Moist mucous membranes. Lungs / Respiratory: No audible wheezing, no signs of respiratory distress, lips non cyanotic  Cardiac: No marked JVD visible on video. Extremities:  No edema  Neuro: A&Ox3, speech clear, no facial droop, answering questions appropriately  Skin: Skin color is normal. No rashes or lesions. Non diaphoretic on visible skin during exam  Psych: Appropriate affect         LABS / OTHER STUDIES:     Labs 7/16/20 - CMP OK (gc 129), A1c 5.9, CBC OK, chol 212, , HDL 47,            CARDIAC DIAGNOSTICS:     Cardiac Evaluation Includes:    Dr. Terri Velasquez noted  On 9/27/16 - -  EKG 5/27/16 demonstrated tachycardia, highly suggestive of AFL. He also had a similar EKG on 4/20/16. Previous Holter monitor in 2013 demonstrated SR with a PVC. Holter monitor in 2011 was unrevealing. He recently presented to the hospital in April for tachycardia and was started on cardizem, digoxin, and Eliquis, and was planned for cardioversion. .. He underwent an EPS during which we were unable to induce AFL therefore CTI ablation was performed. Digoxin and diltiazem were discontinued and sleep study as outpatient was reccommended which has not been performed yet. He is doing well since his procedure and notes he has not felt this good in a long time. He does not wish to complete sleep study. Successful cavo-tricuspid isthmus ablation 6/21/16        ASSESSMENT AND PLAN:     Assessment and Plan:  1) History of atrial flutter with CTI ablation 6/21/16   - He had been doing well after ablation until just a few days ago   - He has had on-off spells of HR racing spells past few days   - He drinks a lot of alcohol over weekends and knows he needs to cut that back in order to prevent afib. He has gained weight and says he will work on reducing that.   He does not seem interested in a sleep study.    - His BP has been high (see below)   - Given recurrent heart racing spells (which feel like his prior atrial flutter spells) and high BP, will start Cardizem (for HR and BP control) - start at 120 mg daily   - Will check a 2 week event monitor to assess symptoms   - [id number] score is increasing. Figure [id number] score at least 1 - HTN (BP high multiple times). He also is pre-diabetic and is almost 73 yo. I recommended [address] with Eliquis for stroke prevention but he declines [address] and he understands his increased stroke risk with atrial flutter. .   - Will have him see Dr. Jose G Keith in a few weeks for further follow-up. 2) HTN   - He says he has a degree of white coat HTN but BP seems at least mildly elevated often  - /100 at home today   - Start Cardizem as above     3) Alcohol   - says he is a weekend warrior ---> asked him to cut that back     4) Have him see Dr. Jose G Keith in a few weeks. ADDENDUM   8/10/2020  Monitor shows PAF many times. I spoke to him. Reviewed risk of stroke with PAF (he has accumulating risk factors). Reviewed indications for [address] and  benefits but he declines [address]. He has not taken any cardizem since he feels fine. I told him that EP will be the one following and managing his Afib          ADDENDUM   8/23/2020  Event Monitor 8/3/20-8/16/20 - several runs of Afib / Brenton Porter with RVR    He has been referred to EP. Rashard Bartholomew MD, [place] [address] [place] [address], [address]  [address] [facility] [address] [place] [address], [facility] N. Monica Paulino.  [place], [address] [place] [address]  Ph: [phone]   Ph [phone]          We discussed the expected course, resolution and complications of the diagnosis(es) in detail. Medication risks, benefits, costs, interactions, and alternatives were discussed as indicated.   I advised him to contact the office if his condition worsens, changes or fails to improve as anticipated. He expressed understanding with the diagnosis(es) and plan    Vaibhav Liu MD    Greater than 20 minutes was spent in direct video patient care, planning and chart review. This visit was conducted using [employer] telemedicine services.

## 2020-07-29 ENCOUNTER — VIRTUAL VISIT (OUTPATIENT)
Dept: CARDIOLOGY CLINIC | Age: 65
End: 2020-07-29

## 2020-07-29 DIAGNOSIS — I10 ESSENTIAL HYPERTENSION: ICD-10-CM

## 2020-07-29 DIAGNOSIS — I48.92 ATRIAL FLUTTER, UNSPECIFIED TYPE (HCC): Primary | ICD-10-CM

## 2020-07-29 NOTE — PROGRESS NOTES
HISTORY OF PRESENTING ILLNESS      Robby Howe is a 63 y.o. male with history of tachycardia and gout. EKG 5/27/16 demonstrated tachycardia, highly suggestive of AFL. He also had a similar EKG on 4/20/16. Previous Holter monitor in 2013 demonstrated SR with a PVC. Holter monitor in 2011 was unrevealing. He recently presented to the hospital in April for tachycardia and was started on cardizem, digoxin, and Eliquis, and was planned for cardioversion. His last echo was in 2008. He had been dealing with knee and ankle pain due to gout. He no longer drinks caffeine and has limited his alcohol intake. He has lost a significant amount of weight recently. He does drink sodas occasionally. He underwent an EPS during which we were unable to induce AFL therefore CTI ablation was performed. Digoxin and diltiazem were discontinued and sleep study as outpatient was reccommended which has not been performed yet. He is doing well since his procedure and notes he has not felt this good in a long time. He does not wish to complete sleep study. Per FAA requirements he underwent Holter monitor which demonstrated sinus rhythm throughout with one episode of non sustained AT. Recently he experienced some tachycardia which he attributed to consuming several alcoholic beverages. He was prescribed diltiazem however has not taken it. He has noticed some fluctuation in SBP. Resting rates are 80-90 bpm on average. He was arranged for monitor however has not worn this yet. He has not been hydrating aggressively and reports dark yellow urine.          PAST MEDICAL HISTORY     Past Medical History:   Diagnosis Date    Abnormal echocardiogram 10/28/2010    Atrial flutter ([facility])     s/p ablation     Dyslipidemia     Gout     High blood pressure     High blood pressure     Obesity 10/28/2010    Prediabetes     [facility]     S/P ablation of atrial flutter     Supraventricular tachycardia ([address] 10/28/2010 PAST SURGICAL HISTORY     Past Surgical History:   Procedure Laterality Date    CARDIAC SURG PROCEDURE UNLIST  2016    cardiac ablation    HX ACL RECONSTRUCTION  6/2013    menniscus    HX ROTATOR CUFF REPAIR Left 2005    HX ROTATOR CUFF REPAIR Right 2007    HX WISDOM TEETH EXTRACTION            ALLERGIES     Allergies   Allergen Reactions    Percocet [Oxycodone-Acetaminophen] Itching     Pt has used hydrocodone          FAMILY HISTORY     Family History   Problem Relation Age of Onset    No Known Problems Mother     No Known Problems Father     Cancer Sister 43        breast    No Known Problems Sister     negative for cardiac disease       SOCIAL HISTORY     Social History     Socioeconomic History    Marital status: SINGLE     Spouse name: Not on file    Number of children: Not on file    Years of education: Not on file    Highest education level: Not on file   Tobacco Use    Smoking status: Never Smoker    Smokeless tobacco: Never Used   Substance and Sexual Activity    Alcohol use: Not Currently     Alcohol/week: 16.0 standard drinks     Types: 16 Cans of beer per week     Comment: reports not drinking during the week, only on weekends    Drug use: No         MEDICATIONS     Current Outpatient Medications   Medication Sig    allopurinoL (ZYLOPRIM) 100 mg tablet Take 100 mg by mouth daily. Switched from Uloric to allopurinol on 7/17/2020    dilTIAZem ER (CARDIZEM CD) 120 mg capsule Take 1 Cap by mouth daily. No current facility-administered medications for this visit. I have reviewed the nurses notes, vitals, problem list, allergy list, medical history, family, social history and medications. REVIEW OF SYMPTOMS      General: Pt denies excessive weight gain or loss. Pt is able to conduct ADL's  HEENT: Denies blurred vision, headaches, hearing loss, epistaxis and difficulty swallowing.   Respiratory: Denies cough, congestion, shortness of breath, HERRERA, wheezing or stridor. Cardiovascular: Denies precordial pain, palpitations, edema or PND  Gastrointestinal: Denies poor appetite, indigestion, abdominal pain or blood in stool  Genitourinary: Denies hematuria, dysuria, increased urinary frequency  Musculoskeletal: Denies joint pain or swelling from muscles or joints  Neurologic: Denies tremor, paresthesias, headache, or sensory motor disturbance  Psychiatric: Denies confusion, insomnia, depression  Integumentray: Denies rash, itching or ulcers. Hematologic: Denies easy bruising, bleeding       PHYSICAL EXAMINATION      Vitals: see vitals section  General: Well developed, in no acute distress. HEENT: No jaundice, oral mucosa moist, no oral ulcers  Neck: Supple, no stiffness, no lymphadenopathy, supple  Heart:  Normal S1/S2 negative S3 or S4. Regular, no murmur, gallop or rub, no jugular venous distention  Respiratory: Clear bilaterally x 4, no wheezing or rales  Abdomen:   Soft, non-tender, bowel sounds are active. Extremities:  No edema, normal cap refill, no cyanosis. Musculoskeletal: No clubbing, no deformities  Neuro: A&Ox3, speech clear, gait stable, cooperative, no focal neurologic deficits  Skin: Skin color is normal. No rashes or lesions.  Non diaphoretic, moist.  Vascular: 2+ pulses symmetric in all extremities       DIAGNOSTIC DATA      EKG:        LABORATORY DATA      Lab Results   Component Value Date/Time    WBC 8.5 06/14/2016 03:50 PM    HGB 13.8 06/14/2016 03:50 PM    HCT 39.9 06/14/2016 03:50 PM    PLATELET 601 76/69/0940 03:50 PM    MCV 77 (L) 06/14/2016 03:50 PM      Lab Results   Component Value Date/Time    Sodium 140 11/20/2017 09:42 AM    Potassium 3.9 11/20/2017 09:42 AM    Chloride 103 11/20/2017 09:42 AM    CO2 28 11/20/2017 09:42 AM    Anion gap 9 11/20/2017 09:42 AM    Glucose 93 11/20/2017 09:42 AM    BUN 10 11/20/2017 09:42 AM    Creatinine 0.91 11/20/2017 09:42 AM    BUN/Creatinine ratio 11 (L) 11/20/2017 09:42 AM    GFR est AA >60 11/20/2017 09:42 AM    GFR est non-AA >60 11/20/2017 09:42 AM    Calcium 9.6 11/20/2017 09:42 AM           ASSESSMENT      1. Atrial flutter                A. S/p ablation  2. Premature atrial contractions  3. Palpitations       PLAN     Proceed with monitor as planned. Patient will notify us once he is experienced a symptomatic episode captured by the monitor for us to review. Further recommendations based on monitor findings. Encouraged enhanced hydration. ICD-10-CM ICD-9-CM    1. Atrial flutter, unspecified type ([address]  I48.92 427.32    2. Essential hypertension  I10 401.9      No orders of the defined types were placed in this encounter. FOLLOW-UP     TBD      Thank you, Sid Brewer MD and Dr. Girma Matthews for allowing me to participate in the care of this extraordinarily pleasant male. Please do not hesitate to contact me for further questions/concerns.          Lourdes Marley MD  Cardiac Electrophysiology / Cardiology    [address]  [address] [place] [address]  [phone] / [phone] Fax   [phone] / [phone] Fax

## 2020-08-07 ENCOUNTER — TELEPHONE (OUTPATIENT)
Dept: CARDIOLOGY CLINIC | Age: 65
End: 2020-08-07

## 2020-08-07 ENCOUNTER — DOCUMENTATION ONLY (OUTPATIENT)
Dept: CARDIOLOGY CLINIC | Age: 65
End: 2020-08-07

## 2020-08-07 NOTE — TELEPHONE ENCOUNTER
Per Dr. Sanford Kimble: Wade Maldonado - can you please have him double his cardizem to 240 mg daily. Terrieemma Rodriguez can you please have him check a CMP, TSH and CBC ASAP and then we can start Eliquis 5 mg BID as soon as we have lab results to review. \"    Spoke to pt,  Since seeing Dr. Sanford Kimble, he filled his diltiazem, however he started feeling better, so he never started taking it. Discussed getting labs drawn to look at starting Eliquis. He stated that he is very hesitant to start Eliquis. He has researched this previously and isn't interested. I ask that you please give him a call? Thanks!

## 2020-08-07 NOTE — PROGRESS NOTES
Received serious loop alerts      8/7/20   2:24am   Auto Trigger  Asymptomatic Home Sleeping    Atrial Flutter w/Run of VT (12 beats) rates up to 206 bpm    8/7/20   12:59am   Auto Trigger   Atrial Flutter w/Variable Conduction/MF PVCs (2 in 1 min) rates up to 138 bpm    8/7/20   12:48am   Auto Trigger   Atrial Fibrillation w/RVR Sustained/PVCs (1 in 1 min) rates up to 140 bpm    8/6/20   8:55pm   Auto Trigger   Atrial Fibrillation w/RVR Sustained/PVCs (1 in 1 min) rates up to 140 bpm    8/5/20   8:04pm  Auto Trigger   Atrial Fibrillation w/RVR Sustained/PVCs (14 in 1 min) rates up to 150 bpm    8/5/20   12:21am    Auto Trigger   Atrial Fibrillation w/RVR Sustained/PVCs (1 in 1 min) rates up to 140 bpm    8/4/20   4:10pm   Pt Trigger  Rapid or Fast Beats   Atrial Flutter w/Variable Conduction/PVCs (1 in 1 min) rates up to 140 bpm    8/4/20   2:15am   Auto Trigger   Atrial Fibrillation w/RVR Sustained rates up to 130 bpm    8/3/20   11:12pm    Baseline   Sinus Tachycardia rates up to 138 bpm      Informed Dr Geoffrey Hernandez

## 2020-08-07 NOTE — TELEPHONE ENCOUNTER
Spoke to pt  Reiterated Dr. Bey Service recommendations of starting Diltiazem for a fib w RVR. Discussed rates from monitor and when it was happening. Pt was asking about what could be triggering the A. Fib. He asking about possibly having a sleep study? Discussed Eliquis and reasoning for the medication. He stated he has researched the medication previously and is worried about the side effects. Discussed stroke risk prevention and he was not worried about this. Pt stated that he saw Dr. Mercie Mohs previously. Was trying to decide if he should stay with Dr. Gardner Free back to Dr. Enid Gambino?    Started Monitor Monday; 2 week monitor. Asking if he is able to take it off to go swimming. Per Dr. Ritchie Alford, he should follow up with [facility] in the next few weeks. PCP did labs 2 weeks ago; will request.    Was on the phone with pt for just under 15 minutes answering questions. Koki,  Please call pt to schedule follow up with  [facility] in about 2 weeks.

## 2020-08-10 ENCOUNTER — DOCUMENTATION ONLY (OUTPATIENT)
Dept: CARDIOLOGY CLINIC | Age: 65
End: 2020-08-10

## 2020-08-10 NOTE — PROGRESS NOTES
Received serious loop alerts from the weekend      8/10/20   5:16am   Auto Trigger   Atrial Fibrillation w/RVR Sustained/PVCs (2 in 1 min)/Artifact rates up to 120 bpm    8/10/20   2:35am    Auto Trigger   Atrial Fibrillation w/RVR Sustained/PVCs (2 in 1 min) rates up to 120 bpm    8/10/20   12:47am   Auto Trigger   Atrial Fibrillation w/RVR Sustained/PVCs (2 in 1 min) rates up to 130 bpm    8/8/20   4:20am   Auto Trigger   Atrial Flutter w/Variable Conduction/PVCs (1 in 1 min) rates up to 140 bpm      Informed Dr Kamryn Iyer

## 2020-08-11 ENCOUNTER — TELEPHONE (OUTPATIENT)
Dept: CARDIOLOGY CLINIC | Age: 65
End: 2020-08-11

## 2020-08-11 NOTE — TELEPHONE ENCOUNTER
Called pt back to advise, per JESSICA Pickens NP to begin with 120mg Diltiazem every day. Pt states while waiting for my return call, he thought about the dates/times of loop monitor events. He states he hit pause on the monitor,  took the monitor off,  8/7/20 6:00pm and did not put monitor back on until 8/10/20 8:00am.  Pt is asking how it could have recorded events if he was not wearing it. Advised pt to hold starting Diltiazem for now. Advised I will contact Jewel Abarca in device clinic and advise JESSICA Pickens NP.

## 2020-08-11 NOTE — TELEPHONE ENCOUNTER
Spoke to pt and advised of recent notifications from monitor. Advised per JESSICA Mckeon, NP would like pt to increase Diltiazem 240mg every day and f/u w/Dr. Azael Muñoz after loop monitor. Pt states after VV 7/29/20 w/Dr. Azael Muñoz he felt better and has not started the Diltiazem. Pt is asking if he should begin w/120mg dose instead of 240mg. Advised w/review w/JESSICA Mckeon NP and call pt back to confirm dosage. Scheduled f/u appt.  W/Dr. Azael Muñoz 8/26 11:20am.

## 2020-08-11 NOTE — TELEPHONE ENCOUNTER
Pt called back and stated the dates/times that he had advised us he had paused his monitor were incorrect. He states he paused the monitor and took it off 08/08/20 6:00pm and resumed use of the monitor 08/09/20 at 9:00pm.  Therefore, what events the monitor captured were accurate. Advised pt to begin taking Diltiazem 120mg every day, plan for f/u w/Dr. Kali Claire 08/26/20 and call back w/any questions or concerns. Pt verbalized understanding.

## 2020-08-25 NOTE — PROGRESS NOTES
HISTORY OF PRESENTING ILLNESS      Marlo Miramontes is a 63 y.o. male history of tachycardia and gout. EKG 5/27/16 demonstrated tachycardia, highly suggestive of AFL. He also had a similar EKG on 4/20/16. Previous Holter monitor in 2013 demonstrated SR with a PVC. Holter monitor in 2011 was unrevealing. He recently presented to the hospital in April for tachycardia and was started on cardizem, digoxin, and Eliquis, and was planned for cardioversion. His last echo was in 2008. He had been dealing with knee and ankle pain due to gout. He no longer drinks caffeine and has limited his alcohol intake. He has lost a significant amount of weight recently. He does drink sodas occasionally. He underwent an EPS during which we were unable to induce AFL therefore CTI ablation was performed. Digoxin and diltiazem were discontinued and sleep study as outpatient was reccommended which has not been performed yet. He is doing well since his procedure and notes he has not felt this good in a long time. He does not wish to complete sleep study. Per FAA requirements he underwent Holter monitor which demonstrated sinus rhythm throughout with one episode of non sustained AT. Recently he experienced some tachycardia which he attributed to consuming several alcoholic beverages. He was prescribed diltiazem however has not taken it. He has noticed some fluctuation in SBP. Resting rates are 80-90 bpm on average. He was arranged for monitor however has not worn this yet. He has not been hydrating aggressively and reports dark yellow urine. Monitor demonstrated AFL (possibly AF) with tachycardia. He started diltiazem during the last week of his monitor. He is asymptomatic. Tolerating diltiazem thus far.           PAST MEDICAL HISTORY     Past Medical History:   Diagnosis Date    Abnormal echocardiogram 10/28/2010    Atrial flutter (HCC)     s/p ablation     Dyslipidemia     Gout     High blood pressure     High blood pressure     Obesity 10/28/2010    Prediabetes     Prostate [facility] ([facility] [address]     S/P ablation of atrial flutter     Supraventricular tachycardia ([facility] [address] 10/28/2010           PAST SURGICAL HISTORY     Past Surgical History:   Procedure Laterality Date    CARDIAC SURG PROCEDURE UNLIST  2016    cardiac ablation    HX ACL RECONSTRUCTION  6/2013    menniscus    HX ROTATOR CUFF REPAIR Left 2005    HX ROTATOR CUFF REPAIR Right 2007    HX WISDOM TEETH EXTRACTION            ALLERGIES     Allergies   Allergen Reactions    Percocet [Oxycodone-Acetaminophen] Itching     Pt has used hydrocodone          FAMILY HISTORY     Family History   Problem Relation Age of Onset    No Known Problems Mother     No Known Problems Father     Cancer Sister 43        breast    No Known Problems Sister     negative for cardiac disease       SOCIAL HISTORY     Social History     Socioeconomic History    Marital status: SINGLE     Spouse name: Not on file    Number of children: Not on file    Years of education: Not on file    Highest education level: Not on file   Tobacco Use    Smoking status: Never Smoker    Smokeless tobacco: Never Used   Substance and Sexual Activity    Alcohol use: Not Currently     Alcohol/week: 16.0 standard drinks     Types: 16 Cans of beer per week     Comment: reports not drinking during the week, only on weekends    Drug use: No         MEDICATIONS     Current Outpatient Medications   Medication Sig    aspirin-calcium carbonate 81 mg-300 mg calcium(777 mg) tab Take 81 mg by mouth daily.  allopurinoL (ZYLOPRIM) 100 mg tablet Take 100 mg by mouth daily. Switched from Uloric to allopurinol on 7/17/2020    dilTIAZem ER (CARDIZEM CD) 120 mg capsule Take 1 Cap by mouth daily. No current facility-administered medications for this visit. I have reviewed the nurses notes, vitals, problem list, allergy list, medical history, family, social history and medications.        REVIEW OF SYMPTOMS General: Pt denies excessive weight gain or loss. Pt is able to conduct ADL's  HEENT: Denies blurred vision, headaches, hearing loss, epistaxis and difficulty swallowing. Respiratory: Denies cough, congestion, shortness of breath, HERRERA, wheezing or stridor. Cardiovascular: Denies precordial pain, palpitations, edema or PND  Gastrointestinal: Denies poor appetite, indigestion, abdominal pain or blood in stool  Genitourinary: Denies hematuria, dysuria, increased urinary frequency  Musculoskeletal: Denies joint pain or swelling from muscles or joints  Neurologic: Denies tremor, paresthesias, headache, or sensory motor disturbance  Psychiatric: Denies confusion, insomnia, depression  Integumentray: Denies rash, itching or ulcers. Hematologic: Denies easy bruising, bleeding       PHYSICAL EXAMINATION      Vitals: see vitals section  General: Well developed, in no acute distress. HEENT: No jaundice, oral mucosa moist, no oral ulcers  Neck: Supple, no stiffness, no lymphadenopathy, supple  Heart:  Normal S1/S2 negative S3 or S4. Regular, no murmur, gallop or rub, no jugular venous distention  Respiratory: Clear bilaterally x 4, no wheezing or rales  Abdomen:   Soft, non-tender, bowel sounds are active. Extremities:  No edema, normal cap refill, no cyanosis. Musculoskeletal: No clubbing, no deformities  Neuro: A&Ox3, speech clear, gait stable, cooperative, no focal neurologic deficits  Skin: Skin color is normal. No rashes or lesions.  Non diaphoretic, moist.  Vascular: 2+ pulses symmetric in all extremities       DIAGNOSTIC DATA      EKG:        LABORATORY DATA      Lab Results   Component Value Date/Time    WBC 8.5 06/14/2016 03:50 PM    HGB 13.8 06/14/2016 03:50 PM    HCT 39.9 06/14/2016 03:50 PM    PLATELET 235 01/11/0831 03:50 PM    MCV 77 (L) 06/14/2016 03:50 PM      Lab Results   Component Value Date/Time    Sodium 140 11/20/2017 09:42 AM    Potassium 3.9 11/20/2017 09:42 AM    Chloride 103 11/20/2017 09:42 AM CO2 28 11/20/2017 09:42 AM    Anion gap 9 11/20/2017 09:42 AM    Glucose 93 11/20/2017 09:42 AM    BUN 10 11/20/2017 09:42 AM    Creatinine 0.91 11/20/2017 09:42 AM    BUN/Creatinine ratio 11 (L) 11/20/2017 09:42 AM    GFR est AA >60 11/20/2017 09:42 AM    GFR est non-AA >60 11/20/2017 09:42 AM    Calcium 9.6 11/20/2017 09:42 AM           ASSESSMENT      1. Atrial flutter                A. S/p ablation  2. Premature atrial contractions  3. Palpitations  4. Atrial fibrillation? PLAN     Continue current regimen      ICD-10-CM ICD-9-CM    1. Essential hypertension  I10 401.9    2. Atrial flutter, unspecified type (HCC)  I48.92 427.32      Orders Placed This Encounter    aspirin-calcium carbonate 81 mg-300 mg calcium(777 mg) tab     Sig: Take 81 mg by mouth daily. FOLLOW-UP       Thank you, Eder Portillo MD and Dr. Bill An  for allowing me to participate in the care of this extraordinarily pleasant male. Please do not hesitate to contact me for further questions/concerns.          Yumiko Harrison MD  Cardiac Electrophysiology / Cardiology    [address]  [address] [facility], [facility], [address], [address] [place] [address] Drive    [facility]  [phone] / [phone] Fax   [phone] / [phone] Fax

## 2020-08-26 ENCOUNTER — OFFICE VISIT (OUTPATIENT)
Dept: CARDIOLOGY CLINIC | Age: 65
End: 2020-08-26

## 2020-08-26 VITALS
OXYGEN SATURATION: 98 % | DIASTOLIC BLOOD PRESSURE: 86 MMHG | SYSTOLIC BLOOD PRESSURE: 138 MMHG | HEIGHT: 73 IN | BODY MASS INDEX: 33.88 KG/M2 | WEIGHT: 255.6 LBS | HEART RATE: 73 BPM

## 2020-08-26 DIAGNOSIS — I48.92 ATRIAL FLUTTER, UNSPECIFIED TYPE (HCC): ICD-10-CM

## 2020-08-26 DIAGNOSIS — I10 ESSENTIAL HYPERTENSION: Primary | ICD-10-CM

## 2020-08-26 NOTE — PROGRESS NOTES
Chief Complaint   Patient presents with    Follow-up     Patient presents today for a follow up for PAF    Irregular Heart Beat       Visit Vitals  /86 (BP 1 Location: Left arm, BP Patient Position: Sitting)   Pulse 73   Ht 6' 1\" (1.854 m)   Wt 255 lb 9.6 oz (115.9 kg)   SpO2 98%   BMI 33.72 kg/m²       Chest pain denied  SOB - denied  Dizziness denied  Swelling/Edema - denied  Recent hospital visit denied  Refills denied

## 2020-12-01 ENCOUNTER — TELEPHONE (OUTPATIENT)
Dept: CARDIOLOGY CLINIC | Age: 65
End: 2020-12-01

## 2020-12-01 DIAGNOSIS — Z01.812 PRE-PROCEDURE LAB EXAM: ICD-10-CM

## 2020-12-01 DIAGNOSIS — I48.92 ATRIAL FLUTTER, UNSPECIFIED TYPE (HCC): Primary | ICD-10-CM

## 2020-12-01 DIAGNOSIS — I48.91 ATRIAL FIBRILLATION, UNSPECIFIED TYPE (HCC): ICD-10-CM

## 2020-12-01 RX ORDER — SODIUM CHLORIDE 0.9 % (FLUSH) 0.9 %
5-40 SYRINGE (ML) INJECTION EVERY 8 HOURS
Status: CANCELLED | OUTPATIENT
Start: 2020-12-01

## 2020-12-01 RX ORDER — SODIUM CHLORIDE 0.9 % (FLUSH) 0.9 %
5-40 SYRINGE (ML) INJECTION AS NEEDED
Status: CANCELLED | OUTPATIENT
Start: 2020-12-01

## 2020-12-01 NOTE — TELEPHONE ENCOUNTER
Returned call, ID verified using two patient identifiers. Scheduled patient for AFL/possible AFib ablation with Dr. Governor Crabtree @ [address] [place] [address] on Tuesday 12/22/20 with an arrival time of 10 am. Pre procedure instructions to be mailed to patient's home address. CTA of chest ordered, patient aware that  from the hospital will be contacting him to schedule the CTA.

## 2020-12-08 ENCOUNTER — TELEPHONE (OUTPATIENT)
Dept: CARDIOLOGY CLINIC | Age: 65
End: 2020-12-08

## 2020-12-08 NOTE — TELEPHONE ENCOUNTER
Patient is requesting to speak with Melissa regarding his sob. Please advise. Patient was not experiencing sob at time of call. Please advise.     Phone: [phone]

## 2020-12-08 NOTE — TELEPHONE ENCOUNTER
Returned call, ID verified using two patient identifiers. Patient states he has been experiencing episodes of feeling  like \"he's not getting enough air\" and that it's hard to take a deep breathe. Episodes happen usually at night. He states it started when he started taking Diltiazem, and it makes him feel anxious. Denies any CP, palpitations, dizziness, swelling in feet and legs or tightness in his abdomen. Informed patient that I would relay information to Dr. Desirae Bangura and JESSICA Malcolm NP for recommendations. Also advised patient to call on call  After hours if needed and to seek immediate medical attention if symptoms worsen or new symptoms arise. Patient verbalizes understanding.

## 2020-12-09 ENCOUNTER — HOSPITAL ENCOUNTER (OUTPATIENT)
Dept: CT IMAGING | Age: 65
Discharge: HOME | End: 2020-12-09
Attending: INTERNAL MEDICINE
Payer: COMMERCIAL

## 2020-12-09 DIAGNOSIS — I48.92 ATRIAL FLUTTER, UNSPECIFIED TYPE (HCC): ICD-10-CM

## 2020-12-09 DIAGNOSIS — I48.91 ATRIAL FIBRILLATION, UNSPECIFIED TYPE (HCC): ICD-10-CM

## 2020-12-09 PROCEDURE — 74011000636 HC RX REV CODE- 636: Performed by: RADIOLOGY

## 2020-12-09 PROCEDURE — 71275 CT ANGIOGRAPHY CHEST: CPT

## 2020-12-09 RX ADMIN — IOPAMIDOL 100 ML: 755 INJECTION, SOLUTION INTRAVENOUS at 14:25

## 2020-12-10 NOTE — TELEPHONE ENCOUNTER
Dennis Smith NP  You; Ana Laura Gerardo MD Yesterday (9:37 AM)       He's been taking diltiazem for >3 months, doubtful that this is medication related if it is a new symptom. I can switch him to Metoprolol until his procedure in two weeks if he'd prefer     Called patient, ID verified using two patient identifiers. Informed patient of above information per JESSICA Spicer NP. Patient states he has started taking the Diltiazem at night and is propping him self up with pillows and for the last 2 nights he has no issues with his breathing. He will continue taking Diltiazem at this time. He will contact the office with any questions or concerns.

## 2020-12-16 LAB
BUN SERPL-MCNC: 16 MG/DL (ref 8–27)
BUN/CREAT SERPL: 14 (ref 10–24)
CALCIUM SERPL-MCNC: 9.7 MG/DL (ref 8.6–10.2)
CHLORIDE SERPL-SCNC: 101 MMOL/L (ref 96–106)
CO2 SERPL-SCNC: 22 MMOL/L (ref 20–29)
CREAT SERPL-MCNC: 1.13 MG/DL (ref 0.76–1.27)
ERYTHROCYTE [DISTWIDTH] IN BLOOD BY AUTOMATED COUNT: 13.7 % (ref 11.6–15.4)
GLUCOSE SERPL-MCNC: 119 MG/DL (ref 65–99)
HCT VFR BLD AUTO: 47.2 % (ref 37.5–51)
HGB BLD-MCNC: 15.8 G/DL (ref 13–17.7)
INR PPP: 1 (ref 0.9–1.2)
MCH RBC QN AUTO: 26.8 PG (ref 26.6–33)
MCHC RBC AUTO-ENTMCNC: 33.5 G/DL (ref 31.5–35.7)
MCV RBC AUTO: 80 FL (ref 79–97)
PLATELET # BLD AUTO: 193 X10E3/UL (ref 150–450)
POTASSIUM SERPL-SCNC: 4.3 MMOL/L (ref 3.5–5.2)
PROTHROMBIN TIME: 10.8 SEC (ref 9.1–12)
RBC # BLD AUTO: 5.9 X10E6/UL (ref 4.14–5.8)
SODIUM SERPL-SCNC: 138 MMOL/L (ref 134–144)
WBC # BLD AUTO: 7.6 X10E3/UL (ref 3.4–10.8)

## 2020-12-18 ENCOUNTER — HOSPITAL ENCOUNTER (OUTPATIENT)
Dept: LAB | Age: 65
Discharge: HOME | End: 2020-12-18
Payer: COMMERCIAL

## 2020-12-18 ENCOUNTER — TRANSCRIBE ORDER (OUTPATIENT)
Dept: REGISTRATION | Age: 65
End: 2020-12-18

## 2020-12-18 DIAGNOSIS — Z01.812 PRE-PROCEDURAL LABORATORY EXAMINATIONS: ICD-10-CM

## 2020-12-18 DIAGNOSIS — I48.92 ATRIAL FLUTTER, UNSPECIFIED TYPE (HCC): ICD-10-CM

## 2020-12-18 DIAGNOSIS — Z01.812 PRE-PROCEDURAL LABORATORY EXAMINATIONS: Primary | ICD-10-CM

## 2020-12-18 DIAGNOSIS — I48.91 ATRIAL FIBRILLATION, UNSPECIFIED TYPE (HCC): ICD-10-CM

## 2020-12-18 PROCEDURE — 87635 SARS-COV-2 COVID-19 AMP PRB: CPT

## 2020-12-19 LAB — SARS-COV-2, COV2NT: NOT DETECTED

## 2020-12-22 ENCOUNTER — ANESTHESIA (OUTPATIENT)
Dept: CARDIAC CATH/INVASIVE PROCEDURES | Age: 65
End: 2020-12-22
Payer: COMMERCIAL

## 2020-12-22 ENCOUNTER — HOSPITAL ENCOUNTER (OUTPATIENT)
Age: 65
Setting detail: OUTPATIENT SURGERY
Discharge: HOME | End: 2020-12-22
Attending: INTERNAL MEDICINE | Admitting: INTERNAL MEDICINE
Payer: COMMERCIAL

## 2020-12-22 ENCOUNTER — ANESTHESIA EVENT (OUTPATIENT)
Dept: CARDIAC CATH/INVASIVE PROCEDURES | Age: 65
End: 2020-12-22
Payer: COMMERCIAL

## 2020-12-22 ENCOUNTER — APPOINTMENT (OUTPATIENT)
Dept: CARDIAC CATH/INVASIVE PROCEDURES | Age: 65
End: 2020-12-22
Attending: INTERNAL MEDICINE
Payer: COMMERCIAL

## 2020-12-22 VITALS
BODY MASS INDEX: 33.66 KG/M2 | DIASTOLIC BLOOD PRESSURE: 100 MMHG | HEIGHT: 73 IN | SYSTOLIC BLOOD PRESSURE: 129 MMHG | OXYGEN SATURATION: 95 % | RESPIRATION RATE: 20 BRPM | WEIGHT: 254 LBS | HEART RATE: 108 BPM | TEMPERATURE: 97.9 F

## 2020-12-22 DIAGNOSIS — I48.92 ATRIAL FLUTTER, UNSPECIFIED TYPE (HCC): ICD-10-CM

## 2020-12-22 LAB
ABO + RH BLD: NORMAL
BLOOD GROUP ANTIBODIES SERPL: NORMAL
SPECIMEN EXP DATE BLD: NORMAL

## 2020-12-22 PROCEDURE — 74011250637 HC RX REV CODE- 250/637: Performed by: INTERNAL MEDICINE

## 2020-12-22 PROCEDURE — 77030041242 HC CBL CONN CARTO 3 J&J -D: Performed by: INTERNAL MEDICINE

## 2020-12-22 PROCEDURE — C1759 CATH, INTRA ECHOCARDIOGRAPHY: HCPCS

## 2020-12-22 PROCEDURE — 93621 COMP EP EVL L PAC&REC C SINS: CPT | Performed by: INTERNAL MEDICINE

## 2020-12-22 PROCEDURE — 77030029359 HC PRB ESOPH TEMP CATH ANTM -F: Performed by: INTERNAL MEDICINE

## 2020-12-22 PROCEDURE — 93613 INTRACARDIAC EPHYS 3D MAPG: CPT | Performed by: INTERNAL MEDICINE

## 2020-12-22 PROCEDURE — 74011250636 HC RX REV CODE- 250/636: Performed by: NURSE ANESTHETIST, CERTIFIED REGISTERED

## 2020-12-22 PROCEDURE — 86900 BLOOD TYPING SEROLOGIC ABO: CPT

## 2020-12-22 PROCEDURE — 77030039046 HC PAD DEFIB RADIOTRNSPNT CNMD -B: Performed by: INTERNAL MEDICINE

## 2020-12-22 PROCEDURE — C1769 GUIDE WIRE: HCPCS | Performed by: INTERNAL MEDICINE

## 2020-12-22 PROCEDURE — 77030035291 HC TBNG PMP SMARTABLATE J&J -B: Performed by: INTERNAL MEDICINE

## 2020-12-22 PROCEDURE — C1893 INTRO/SHEATH, FIXED,NON-PEEL: HCPCS | Performed by: INTERNAL MEDICINE

## 2020-12-22 PROCEDURE — 77030013797 HC KT TRNSDUC PRSSR EDWD -A: Performed by: INTERNAL MEDICINE

## 2020-12-22 PROCEDURE — C1760 CLOSURE DEV, VASC: HCPCS | Performed by: INTERNAL MEDICINE

## 2020-12-22 PROCEDURE — 36415 COLL VENOUS BLD VENIPUNCTURE: CPT

## 2020-12-22 PROCEDURE — C1894 INTRO/SHEATH, NON-LASER: HCPCS | Performed by: INTERNAL MEDICINE

## 2020-12-22 PROCEDURE — 93653 COMPRE EP EVAL TX SVT: CPT | Performed by: INTERNAL MEDICINE

## 2020-12-22 PROCEDURE — 76060000034 HC ANESTHESIA 1.5 TO 2 HR: Performed by: INTERNAL MEDICINE

## 2020-12-22 PROCEDURE — 93312 ECHO TRANSESOPHAGEAL: CPT

## 2020-12-22 PROCEDURE — 77030027107 HC PTCH EXT REF CARTO3 J&J -F: Performed by: INTERNAL MEDICINE

## 2020-12-22 PROCEDURE — 93662 INTRACARDIAC ECG (ICE): CPT | Performed by: INTERNAL MEDICINE

## 2020-12-22 PROCEDURE — 77030008684 HC TU ET CUF COVD -B: Performed by: ANESTHESIOLOGY

## 2020-12-22 PROCEDURE — 77030026438 HC STYL ET INTUB CARD -A: Performed by: ANESTHESIOLOGY

## 2020-12-22 PROCEDURE — 77030029065 HC DRSG HEMO QCLOT ZMED -B: Performed by: INTERNAL MEDICINE

## 2020-12-22 PROCEDURE — 74011250636 HC RX REV CODE- 250/636: Performed by: INTERNAL MEDICINE

## 2020-12-22 PROCEDURE — C1732 CATH, EP, DIAG/ABL, 3D/VECT: HCPCS | Performed by: INTERNAL MEDICINE

## 2020-12-22 PROCEDURE — 74011000250 HC RX REV CODE- 250: Performed by: NURSE ANESTHETIST, CERTIFIED REGISTERED

## 2020-12-22 RX ORDER — PHENYLEPHRINE HCL IN 0.9% NACL 0.4MG/10ML
SYRINGE (ML) INTRAVENOUS AS NEEDED
Status: DISCONTINUED | OUTPATIENT
Start: 2020-12-22 | End: 2020-12-22 | Stop reason: HOSPADM

## 2020-12-22 RX ORDER — SUCCINYLCHOLINE CHLORIDE 20 MG/ML
INJECTION INTRAMUSCULAR; INTRAVENOUS AS NEEDED
Status: DISCONTINUED | OUTPATIENT
Start: 2020-12-22 | End: 2020-12-22 | Stop reason: HOSPADM

## 2020-12-22 RX ORDER — SODIUM CHLORIDE 9 MG/ML
25 INJECTION, SOLUTION INTRAVENOUS CONTINUOUS
Status: DISCONTINUED | OUTPATIENT
Start: 2020-12-22 | End: 2020-12-22 | Stop reason: HOSPADM

## 2020-12-22 RX ORDER — FENTANYL CITRATE 50 UG/ML
INJECTION, SOLUTION INTRAMUSCULAR; INTRAVENOUS AS NEEDED
Status: DISCONTINUED | OUTPATIENT
Start: 2020-12-22 | End: 2020-12-22 | Stop reason: HOSPADM

## 2020-12-22 RX ORDER — SODIUM CHLORIDE 9 MG/ML
INJECTION, SOLUTION INTRAVENOUS
Status: DISCONTINUED | OUTPATIENT
Start: 2020-12-22 | End: 2020-12-22 | Stop reason: HOSPADM

## 2020-12-22 RX ORDER — SODIUM CHLORIDE 0.9 % (FLUSH) 0.9 %
5-40 SYRINGE (ML) INJECTION AS NEEDED
Status: DISCONTINUED | OUTPATIENT
Start: 2020-12-22 | End: 2020-12-22 | Stop reason: HOSPADM

## 2020-12-22 RX ORDER — ONDANSETRON 2 MG/ML
INJECTION INTRAMUSCULAR; INTRAVENOUS AS NEEDED
Status: DISCONTINUED | OUTPATIENT
Start: 2020-12-22 | End: 2020-12-22 | Stop reason: HOSPADM

## 2020-12-22 RX ORDER — SODIUM CHLORIDE 0.9 % (FLUSH) 0.9 %
5-40 SYRINGE (ML) INJECTION EVERY 8 HOURS
Status: DISCONTINUED | OUTPATIENT
Start: 2020-12-22 | End: 2020-12-22 | Stop reason: HOSPADM

## 2020-12-22 RX ORDER — IBUPROFEN 600 MG/1
600 TABLET ORAL
COMMUNITY
End: 2021-07-19

## 2020-12-22 RX ORDER — PROPOFOL 10 MG/ML
INJECTION, EMULSION INTRAVENOUS AS NEEDED
Status: DISCONTINUED | OUTPATIENT
Start: 2020-12-22 | End: 2020-12-22 | Stop reason: HOSPADM

## 2020-12-22 RX ORDER — LIDOCAINE HYDROCHLORIDE 20 MG/ML
INJECTION, SOLUTION EPIDURAL; INFILTRATION; INTRACAUDAL; PERINEURAL AS NEEDED
Status: DISCONTINUED | OUTPATIENT
Start: 2020-12-22 | End: 2020-12-22 | Stop reason: HOSPADM

## 2020-12-22 RX ORDER — ROCURONIUM BROMIDE 10 MG/ML
INJECTION, SOLUTION INTRAVENOUS AS NEEDED
Status: DISCONTINUED | OUTPATIENT
Start: 2020-12-22 | End: 2020-12-22 | Stop reason: HOSPADM

## 2020-12-22 RX ORDER — ONDANSETRON 2 MG/ML
4 INJECTION INTRAMUSCULAR; INTRAVENOUS
Status: DISCONTINUED | OUTPATIENT
Start: 2020-12-22 | End: 2020-12-22 | Stop reason: HOSPADM

## 2020-12-22 RX ADMIN — LIDOCAINE HYDROCHLORIDE 40 MG: 20 INJECTION, SOLUTION EPIDURAL; INFILTRATION; INTRACAUDAL; PERINEURAL at 12:57

## 2020-12-22 RX ADMIN — ONDANSETRON HYDROCHLORIDE 4 MG: 2 INJECTION, SOLUTION INTRAMUSCULAR; INTRAVENOUS at 14:03

## 2020-12-22 RX ADMIN — SODIUM CHLORIDE: 900 INJECTION, SOLUTION INTRAVENOUS at 12:42

## 2020-12-22 RX ADMIN — Medication 120 MCG: at 13:07

## 2020-12-22 RX ADMIN — ROCURONIUM BROMIDE 10 MG: 10 SOLUTION INTRAVENOUS at 12:57

## 2020-12-22 RX ADMIN — Medication 200 MCG: at 14:09

## 2020-12-22 RX ADMIN — Medication 120 MCG: at 13:13

## 2020-12-22 RX ADMIN — APIXABAN 5 MG: 5 TABLET, FILM COATED ORAL at 17:13

## 2020-12-22 RX ADMIN — Medication 120 MCG: at 13:10

## 2020-12-22 RX ADMIN — PROPOFOL 200 MG: 10 INJECTION, EMULSION INTRAVENOUS at 12:57

## 2020-12-22 RX ADMIN — Medication 120 MCG: at 14:05

## 2020-12-22 RX ADMIN — Medication 120 MCG: at 13:19

## 2020-12-22 RX ADMIN — SODIUM CHLORIDE 25 ML/HR: 9 INJECTION, SOLUTION INTRAVENOUS at 11:30

## 2020-12-22 RX ADMIN — Medication 120 MCG: at 13:25

## 2020-12-22 RX ADMIN — Medication 120 MCG: at 13:55

## 2020-12-22 RX ADMIN — Medication 120 MCG: at 13:22

## 2020-12-22 RX ADMIN — PROPOFOL 100 MG: 10 INJECTION, EMULSION INTRAVENOUS at 12:58

## 2020-12-22 RX ADMIN — SUCCINYLCHOLINE CHLORIDE 200 MG: 20 INJECTION, SOLUTION INTRAMUSCULAR; INTRAVENOUS at 12:58

## 2020-12-22 RX ADMIN — FENTANYL CITRATE 50 MCG: 50 INJECTION, SOLUTION INTRAMUSCULAR; INTRAVENOUS at 12:55

## 2020-12-22 RX ADMIN — FENTANYL CITRATE 50 MCG: 50 INJECTION, SOLUTION INTRAMUSCULAR; INTRAVENOUS at 12:57

## 2020-12-22 RX ADMIN — Medication 120 MCG: at 13:46

## 2020-12-22 RX ADMIN — SODIUM CHLORIDE: 900 INJECTION, SOLUTION INTRAVENOUS at 13:46

## 2020-12-22 RX ADMIN — Medication 120 MCG: at 13:33

## 2020-12-22 RX ADMIN — Medication 120 MCG: at 13:40

## 2020-12-22 NOTE — PROGRESS NOTES
Tolerated drink, declined food, stated \"Ill eat later when I get home\"  1700 Dr Yumiko Keller in to talk with pt.  1715 Eliquis  5 mg po given/orders  Pt given discount card for Eliquis with instructions. 1730 ambulated `100 ft, gait steady, voided.   Back to stretcher right groin dsg D&I  Assisted with dressing  1745 discharged via w/c with friend ANTHONY FERREIRA [place]), instructions, and belongings

## 2020-12-22 NOTE — PROGRESS NOTES
TRANSFER - IN REPORT:    Verbal report received from janine Dunbar and anesthesia on Carleen Blackburn  being received from procedure for routine progression of care. Report consisted of patients Situation, Background, Assessment and Recommendations(SBAR). Information from the following report(s) Procedure Summary, Intake/Output, MAR, Recent Results and Med Rec Status was reviewed with the receiving clinician. Opportunity for questions and clarification was provided. Assessment completed upon patients arrival to [address] [place] [address] and care assumed. Cardiac Cath Lab Recovery Arrival Note:    Carleen Blackburn arrived to [facility] recovery area. Patient procedure= FREDRICK/EPS/ablation. Patient on cardiac monitor, non-invasive blood pressure, SPO2 monitor. On room air. IV  of nacl on pump at 50 ml/hr. Patient status doing well without problems. Patient is A&Ox 4. Patient reports right groin pain on palpation of site. PROCEDURE SITE CHECK:    Procedure site:without any bleeding and or hematoma, no pain/discomfort reported at procedure site. No change in patient status. Continue to monitor patient and status.

## 2020-12-22 NOTE — H&P
HISTORY OF PRESENTING ILLNESS      Soni Cat is a 63 y.o. male with AFl s/p ablation and now recurrence of AFL and possibly AF as well.  Currently tachycardic in 130s       PAST MEDICAL HISTORY     Past Medical History:   Diagnosis Date    Abnormal echocardiogram 10/28/2010    Atrial flutter (HCC)     s/p ablation     Dyslipidemia     Gout     High blood pressure     High blood pressure     Obesity 10/28/2010    Prediabetes     Prostate [facility] ([facility] [address]     S/P ablation of atrial flutter     Supraventricular tachycardia ([facility] [address] 10/28/2010           PAST SURGICAL HISTORY     Past Surgical History:   Procedure Laterality Date    CARDIAC SURG PROCEDURE UNLIST  2016    cardiac ablation    HX ACL RECONSTRUCTION  6/2013    menniscus    HX ROTATOR CUFF REPAIR Left 2005    HX ROTATOR CUFF REPAIR Right 2007    HX WISDOM TEETH EXTRACTION            ALLERGIES     Allergies   Allergen Reactions    Percocet [Oxycodone-Acetaminophen] Itching     Pt has used hydrocodone          FAMILY HISTORY     Family History   Problem Relation Age of Onset    No Known Problems Mother     No Known Problems Father     Cancer Sister 43        breast    No Known Problems Sister     negative for cardiac disease       SOCIAL HISTORY     Social History     Socioeconomic History    Marital status: SINGLE     Spouse name: Not on file    Number of children: Not on file    Years of education: Not on file    Highest education level: Not on file   Tobacco Use    Smoking status: Never Smoker    Smokeless tobacco: Never Used   Substance and Sexual Activity    Alcohol use: Not Currently     Alcohol/week: 16.0 standard drinks     Types: 16 Cans of beer per week     Comment: reports not drinking during the week, only on weekends    Drug use: No         MEDICATIONS     Current Facility-Administered Medications   Medication Dose Route Frequency    0.9% sodium chloride infusion  25 mL/hr IntraVENous CONTINUOUS       I have reviewed the nurses notes, vitals, problem list, allergy list, medical history, family, social history and medications. REVIEW OF SYMPTOMS      General: Pt denies excessive weight gain or loss. Pt is able to conduct ADL's  HEENT: Denies blurred vision, headaches, hearing loss, epistaxis and difficulty swallowing. Respiratory: Denies cough, congestion, shortness of breath, HERRERA, wheezing or stridor. Cardiovascular: Denies precordial pain, palpitations, edema or PND  Gastrointestinal: Denies poor appetite, indigestion, abdominal pain or blood in stool  Genitourinary: Denies hematuria, dysuria, increased urinary frequency  Musculoskeletal: Denies joint pain or swelling from muscles or joints  Neurologic: Denies tremor, paresthesias, headache, or sensory motor disturbance  Psychiatric: Denies confusion, insomnia, depression  Integumentray: Denies rash, itching or ulcers. Hematologic: Denies easy bruising, bleeding       PHYSICAL EXAMINATION      Vitals: see vitals section  General: Well developed, in no acute distress. HEENT: No jaundice, oral mucosa moist, no oral ulcers  Neck: Supple, no stiffness, no lymphadenopathy, supple  Heart:  tachycardic, no murmur, gallop or rub, no jugular venous distention  Respiratory: Clear bilaterally x 4, no wheezing or rales  Abdomen:   Soft, non-tender, bowel sounds are active. Extremities:  No edema, normal cap refill, no cyanosis. Musculoskeletal: No clubbing, no deformities  Neuro: A&Ox3, speech clear, gait stable, cooperative, no focal neurologic deficits  Skin: Skin color is normal. No rashes or lesions.  Non diaphoretic, moist.  Vascular: 2+ pulses symmetric in all extremities       DIAGNOSTIC DATA      EKG:        LABORATORY DATA      Lab Results   Component Value Date/Time    WBC 7.6 12/15/2020 01:59 PM    HGB 15.8 12/15/2020 01:59 PM    HCT 47.2 12/15/2020 01:59 PM    PLATELET 835 02/48/5780 01:59 PM    MCV 80 12/15/2020 01:59 PM      Lab Results   Component Value Date/Time    Sodium 138 12/15/2020 01:59 PM    Potassium 4.3 12/15/2020 01:59 PM    Chloride 101 12/15/2020 01:59 PM    CO2 22 12/15/2020 01:59 PM    Anion gap 9 11/20/2017 09:42 AM    Glucose 119 (H) 12/15/2020 01:59 PM    BUN 16 12/15/2020 01:59 PM    Creatinine 1.13 12/15/2020 01:59 PM    BUN/Creatinine ratio 14 12/15/2020 01:59 PM    GFR est AA 80 12/15/2020 01:59 PM    GFR est non-AA 69 12/15/2020 01:59 PM    Calcium 9.7 12/15/2020 01:59 PM           ASSESSMENT      1. Atrial flutter  2.  Atrial fibrillation         PLAN     AF/AFl ablation      Leti Schneider MD  Cardiac Electrophysiology / Cardiology    [address]  [address] [place] [address]  [phone] / [phone] Fax   [phone] / [phone] Fax

## 2020-12-22 NOTE — PROCEDURES
Successful CTI ablation performed; unable to induce AF/SVT.        Plan:  DC diltiazem  Eliquis 5 mg bid x 1 month  Repeat 30 day monitor    Tomas Constantino MD

## 2020-12-22 NOTE — DISCHARGE INSTRUCTIONS
Electrophysiology Study (EPS)/Cardiac Ablation   Discharge Instructions      You have just had an electrophysiology study. There were catheters temporarily placed in your heart through a puncture in the Veins and/or Arteries in your groin. WHAT TO EXPECT      If you have had a Cardiac Ablation please be aware that you may experience mild chest pain that will resolve within 24-48 hours. If the Chest Pain begins radiating down your shoulders or arms, becomes severe or breathing becomes difficult, call 911 or go to the closest emergency room.  Mild to moderate, non-painful, bruising or mild swelling at the puncture site is not un-common, and will resolve in 7 - 14 days, and may extend down your thigh as it heals.  If a closure device was used to seal your artery or vein, a separate pamphlet will be given to you with these discharge instructions. It is very important that you review the information in the pamphlet for different restrictions and precautions.  You have a small gauze dressing applied to the puncture site in your groin. You may remove this the following morning.  You MAY receive a 30 day heart monitor in the mail to wear after your procedure. This is to evaluate your heart rhythm post ablation. MEDICATIONS      Take only the medications prescribed to you at discharge. ACTIVITY      A responsible adult must take you home. Do not drive a car for 24 hours.  Rest quietly for the remainder of the day.  Do not lift anything greater than 10 pounds for 3 days.  Limit bending at the puncture site and use of stairs for at least 3 days.  You may remove the bandage and shower the morning after the procedure. Do not take a bath for 3 days. SYMPTOMS THAT NEED TO BE REPORTED IMMEDIATELY      Bleeding at the puncture site. If there is bleeding, lie down and hold firm direct pressure for at least 5 minutes.   If the bleeding does not stop, go to the closest emergency room, or call 911.  Temperature more than 100.5 F.   Redness or warmth at the puncture site.  Increasing pain, numbness, coolness or blue discoloration of the extremity where the puncture is located.  Pulsating mass at the puncture site.  A new lump at the puncture site, or increasing swelling at the site. Please be aware that a pea or marble sized lump is normal, anything larger or increasing in size should be reported.  Bruising at the puncture site that enlarges or becomes painful (some bruising at the site is common and will go away in 7-14 days).  Rapid heart rate or palpitations.  Dizziness, lightheadedness, fainting.  REMEMBER: If you feel something is an emergency or cannot be handled over the phone, call 911 or go to the closest emergency room.       FOLLOW UP CARE     Elian Palomares NP in 2 weeks  Dr. Cherry Rangel in 3 months        Arnoldo Baumann MD  Cardiac Electrophysiology / Cardiology    [address]  [address] [place] [address] [address]    [address] [facility]  [phone] / [phone] Fax   [phone] / [phone] Fax

## 2020-12-22 NOTE — PROGRESS NOTES
Cardiac Cath Lab Recovery Arrival Note:      Cecilia Cisneros arrived to Cardiac Cath Lab, Recovery Area. Staff introduced to patient. Patient identifiers verified with NAME and DATE OF BIRTH. Procedure verified with patient. Consent forms reviewed and signed by patient or authorized representative and verified. Allergies verified. Patient and family oriented to department. Patient and family informed of procedure and plan of care. Questions answered with review. Patient prepped for procedure, per orders from physician, prior to arrival.    Patient on cardiac monitor, non-invasive blood pressure, SPO2 monitor. On room air. Patient is A&Ox 4. Patient reports no pain, but states \"Im very anxious about this\". Patient in stretcher, in low position, with side rails up, call bell within reach, patient instructed to call if assistance as needed. Patient prep in: [address], [place] [address]. Patient family has pager # [phone]  Family in: friend will pick pt up  [facility]  [phone]. Prep by: Angy Rodgers RN    Pre FREDRICK/EPS teaching completed    Anesthesia in to see pt    26  Dr Wilton Nunn in to talk with pt.

## 2020-12-23 ENCOUNTER — TELEPHONE (OUTPATIENT)
Dept: CARDIOLOGY CLINIC | Age: 65
End: 2020-12-23

## 2020-12-23 NOTE — TELEPHONE ENCOUNTER
Returned patient call, ID verified using two patient identifiers. Advised patient that I don't know what paperwork he was given. Eliquis prescription for one month is going to cost  $500. Copy of $10 copay card and 30 day free card faxed to patient's [address] Patient will check with the pharmacy to see if he is eligible for either offer. If he is not eligible he will contact the office and we will provide him with samples of Eliquis 5 mg. Patient verbalizes understanding and is agreeable to this plan.

## 2020-12-23 NOTE — TELEPHONE ENCOUNTER
Patient calling in regards to Eliquis. States he received paperwork at hospital from Dr. Roya Reno and the paperwork was for a discount but when he called he did not reach anyone.       Phone: [phone]

## 2020-12-24 ENCOUNTER — TELEPHONE (OUTPATIENT)
Dept: CARDIOLOGY CLINIC | Age: 65
End: 2020-12-24

## 2020-12-24 NOTE — TELEPHONE ENCOUNTER
Patient states he had an ablation on Tuesday, but he believes his heart rhythm went back to aflutter. Please advise.      Phone: [phone]

## 2021-01-04 ENCOUNTER — TELEPHONE (OUTPATIENT)
Dept: CARDIOLOGY CLINIC | Age: 66
End: 2021-01-04

## 2021-01-04 NOTE — TELEPHONE ENCOUNTER
Patient requesting a call back in regards to flutter and shortness of breath. Patient was not experiencing symptoms at time of call.     Phone: [phone]

## 2021-01-05 NOTE — TELEPHONE ENCOUNTER
Received call from patient, ID verified using two patient identifiers. Patient states he had his ablation on 12/22/20 and on 12/23/20 his palpitations and fluttering came back. He is feeling SOB and can't sleep. He is having orthopnea. He would like to be seen. Will relay information to MD for review and recommendations.

## 2021-01-06 ENCOUNTER — OFFICE VISIT (OUTPATIENT)
Dept: CARDIOLOGY CLINIC | Age: 66
End: 2021-01-06
Payer: COMMERCIAL

## 2021-01-06 VITALS
DIASTOLIC BLOOD PRESSURE: 86 MMHG | BODY MASS INDEX: 33.93 KG/M2 | WEIGHT: 256 LBS | HEIGHT: 73 IN | OXYGEN SATURATION: 100 % | SYSTOLIC BLOOD PRESSURE: 130 MMHG | HEART RATE: 136 BPM

## 2021-01-06 DIAGNOSIS — I48.92 ATRIAL FLUTTER, UNSPECIFIED TYPE (HCC): Primary | ICD-10-CM

## 2021-01-06 PROCEDURE — 99215 OFFICE O/P EST HI 40 MIN: CPT | Performed by: INTERNAL MEDICINE

## 2021-01-06 NOTE — PROGRESS NOTES
HISTORY OF PRESENTING ILLNESS      Gretchen Solorio is a 63 y.o. male who underwent AFL ablation in the past with subsequent recurrence and repeat CTI ablation last month whose monitor shows recurrent AFL (as well as PVCs). He is very symptomatic with HERRERA. Has been compliant with medications including anticoagulation since his procedure.         PAST MEDICAL HISTORY     Past Medical History:   Diagnosis Date    Abnormal echocardiogram 10/28/2010    Atrial flutter (HCC)     s/p ablation     Dyslipidemia     Gout     High blood pressure     High blood pressure     Obesity 10/28/2010    Prediabetes     Pro[facility] ([facility]     S/P ablation of atrial flutter     Supraventricular tachycardia ([facility] [address] 10/28/2010           PAST SURGICAL HISTORY     Past Surgical History:   Procedure Laterality Date    HX ACL RECONSTRUCTION  6/2013    menniscus    HX ROTATOR CUFF REPAIR Left 2005    HX ROTATOR CUFF REPAIR Right 2007    HX WISDOM TEETH EXTRACTION      CT CARDIAC SURG PROCEDURE UNLIST  2016    cardiac ablation    CT COMPRE ELECTROPHYSIOL XM W/LEFT ATRIAL PACNG/REC N/A 12/22/2020    Lt Atrial Pace & Record During Ep Study performed by Uriel Bae MD at [address], [facility] [address] CATH LAB    CT EPHYS EVAL W/ABLATION [facility] ARRHYTHMIA N/A 12/22/2020    ABLATION SVT performed by Uriel Bae MD at [address], [facility] [address] CATH LAB    CT INTRACARDIAC ELECTROPHYSIOLOGIC 3D MAPPING N/A 12/22/2020    Ep 3d Mapping [place] [address], [facility] [address] CATH LAB          ALLERGIES     Allergies   Allergen Reactions    Percocet [Oxycodone-Acetaminophen] Itching     Pt has used hydrocodone          FAMILY HISTORY     Family History   Problem Relation Age of Onset    No Known Problems Mother     No Known Problems Father     Cancer Sister 43        breast    No Known Problems Sister     negative for cardiac disease       SOCIAL HISTORY     Social History     Socioeconomic History    Marital status: SINGLE     Spouse name: Not on file    Number of children: Not on file    Years of education: Not on file    Highest education level: Not on file   Tobacco Use    Smoking status: Never Smoker    Smokeless tobacco: Never Used   Substance and Sexual Activity    Alcohol use: Not Currently     Alcohol/week: 16.0 standard drinks     Types: 16 Cans of beer per week     Comment: reports not drinking during the week, only on weekends    Drug use: No         MEDICATIONS     Current Outpatient Medications   Medication Sig    apixaban (Eliquis) 5 mg tablet Take 1 Tab by mouth two (2) times a day.  allopurinoL (ZYLOPRIM) 100 mg tablet Take 100 mg by mouth daily. Switched from [address] [place] [address] to allopurinol on 7/17/2020    ibuprofen (MOTRIN) 600 mg tablet Take 600 mg by mouth every six (6) hours as needed for Pain. No current facility-administered medications for this visit. I have reviewed the nurses notes, vitals, problem list, allergy list, medical history, family, social history and medications. REVIEW OF SYMPTOMS      General: Pt denies excessive weight gain or loss. Pt is able to conduct ADL's  HEENT: Denies blurred vision, headaches, hearing loss, epistaxis and difficulty swallowing. Respiratory: Denies cough, congestion, shortness of breath, HERRERA, wheezing or stridor. Cardiovascular: Denies precordial pain, palpitations, edema or PND  Gastrointestinal: Denies poor appetite, indigestion, abdominal pain or blood in stool  Genitourinary: Denies hematuria, dysuria, increased urinary frequency  Musculoskeletal: Denies joint pain or swelling from muscles or joints  Neurologic: Denies tremor, paresthesias, headache, or sensory motor disturbance  Psychiatric: Denies confusion, insomnia, depression  Integumentray: Denies rash, itching or ulcers. Hematologic: Denies easy bruising, bleeding       PHYSICAL EXAMINATION      Vitals: see vitals section  General: Well developed, in no acute distress.   HEENT: No jaundice, oral mucosa moist, no oral ulcers  Neck: Supple, no stiffness, no lymphadenopathy, supple  Heart:  irreg irreg, no murmur, gallop or rub, no jugular venous distention  Respiratory: Clear bilaterally x 4, no wheezing or rales  Abdomen:   Soft, non-tender, bowel sounds are active. Extremities:  No edema, normal cap refill, no cyanosis. Musculoskeletal: No clubbing, no deformities  Neuro: A&Ox3, speech clear, gait stable, cooperative, no focal neurologic deficits  Skin: Skin color is normal. No rashes or lesions. Non diaphoretic, moist.  Vascular: 2+ pulses symmetric in all extremities       DIAGNOSTIC DATA      EKG:        LABORATORY DATA      Lab Results   Component Value Date/Time    WBC 7.6 12/15/2020 01:59 PM    HGB 15.8 12/15/2020 01:59 PM    HCT 47.2 12/15/2020 01:59 PM    PLATELET 807 51/90/6224 01:59 PM    MCV 80 12/15/2020 01:59 PM      Lab Results   Component Value Date/Time    Sodium 138 12/15/2020 01:59 PM    Potassium 4.3 12/15/2020 01:59 PM    Chloride 101 12/15/2020 01:59 PM    CO2 22 12/15/2020 01:59 PM    Anion gap 9 11/20/2017 09:42 AM    Glucose 119 (H) 12/15/2020 01:59 PM    BUN 16 12/15/2020 01:59 PM    Creatinine 1.13 12/15/2020 01:59 PM    BUN/Creatinine ratio 14 12/15/2020 01:59 PM    GFR est AA 80 12/15/2020 01:59 PM    GFR est non-AA 69 12/15/2020 01:59 PM    Calcium 9.7 12/15/2020 01:59 PM           ASSESSMENT      1. Atrial flutter   A. CTI ablation x 2  2. Atrial fibrillation  3. PVCs       PLAN     Plan for repeat atrial flutter ablation and possible PVC ablation if observed. ICD-10-CM ICD-9-CM    1. Atrial flutter, unspecified type ([address]  I48.92 427.32      No orders of the defined types were placed in this encounter. FOLLOW-UP       Thank you, Corrie Vargas MD for allowing me to participate in the care of this extraordinarily pleasant male. Please do not hesitate to contact me for further questions/concerns.          Niurka Gold MD  Cardiac Electrophysiology / Cardiology    [address] [facility] [address]  [address] [facility], [facility], [address], [facility] [address] [place] [address] Pkwy  [phone] / [phone] Fax   [phone] / [phone] Fax

## 2021-01-06 NOTE — PROGRESS NOTES
Room 5     Visit Vitals  /86   Pulse (!) 136   Ht 6' 1\" (1.854 m)   Wt 256 lb (116.1 kg)   SpO2 100%   BMI 33.78 kg/m²       Wearing monitor    12-22-20; Successful CTI ablation performed; unable to induce AF/SVT. Patient states he had his ablation on 12/22/20 and on 12/23/20 his palpitations and fluttering came back. He is feeling SOB and can't sleep.   He is having orthopnea     More SOB of breath at night, when laying down     Anxiety

## 2021-01-07 ENCOUNTER — HOSPITAL ENCOUNTER (EMERGENCY)
Age: 66
Discharge: HOME | End: 2021-01-07
Attending: EMERGENCY MEDICINE
Payer: COMMERCIAL

## 2021-01-07 ENCOUNTER — ANESTHESIA (OUTPATIENT)
Dept: CARDIAC CATH/INVASIVE PROCEDURES | Age: 66
End: 2021-01-07
Payer: COMMERCIAL

## 2021-01-07 ENCOUNTER — ANESTHESIA EVENT (OUTPATIENT)
Dept: CARDIAC CATH/INVASIVE PROCEDURES | Age: 66
End: 2021-01-07
Payer: COMMERCIAL

## 2021-01-07 VITALS
SYSTOLIC BLOOD PRESSURE: 107 MMHG | HEART RATE: 96 BPM | OXYGEN SATURATION: 96 % | DIASTOLIC BLOOD PRESSURE: 76 MMHG | TEMPERATURE: 97.6 F | RESPIRATION RATE: 22 BRPM

## 2021-01-07 DIAGNOSIS — I48.91 ATRIAL FIBRILLATION, UNSPECIFIED TYPE (HCC): ICD-10-CM

## 2021-01-07 DIAGNOSIS — I48.4 ATYPICAL ATRIAL FLUTTER (HCC): ICD-10-CM

## 2021-01-07 LAB
ALBUMIN SERPL-MCNC: 4 G/DL (ref 3.5–5)
ALBUMIN/GLOB SERPL: 1.1 {RATIO} (ref 1.1–2.2)
ALP SERPL-CCNC: 94 U/L (ref 45–117)
ALT SERPL-CCNC: 49 U/L (ref 12–78)
ANION GAP SERPL CALC-SCNC: 4 MMOL/L (ref 5–15)
APTT PPP: 29.6 SEC (ref 22.1–31)
AST SERPL-CCNC: 23 U/L (ref 15–37)
ATRIAL RATE: 150 BPM
BASOPHILS # BLD: 0.1 K/UL (ref 0–0.1)
BASOPHILS NFR BLD: 1 % (ref 0–1)
BILIRUB SERPL-MCNC: 1.1 MG/DL (ref 0.2–1)
BUN SERPL-MCNC: 11 MG/DL (ref 6–20)
BUN/CREAT SERPL: 9 (ref 12–20)
CALCIUM SERPL-MCNC: 9.4 MG/DL (ref 8.5–10.1)
CALCULATED R AXIS, ECG10: 47 DEGREES
CALCULATED T AXIS, ECG11: 103 DEGREES
CHLORIDE SERPL-SCNC: 108 MMOL/L (ref 97–108)
CO2 SERPL-SCNC: 27 MMOL/L (ref 21–32)
COMMENT, HOLDF: NORMAL
COVID-19 RAPID TEST, COVR: NOT DETECTED
CREAT SERPL-MCNC: 1.16 MG/DL (ref 0.7–1.3)
DIAGNOSIS, 93000: NORMAL
DIFFERENTIAL METHOD BLD: ABNORMAL
EOSINOPHIL # BLD: 0.1 K/UL (ref 0–0.4)
EOSINOPHIL NFR BLD: 2 % (ref 0–7)
ERYTHROCYTE [DISTWIDTH] IN BLOOD BY AUTOMATED COUNT: 14.4 % (ref 11.5–14.5)
GLOBULIN SER CALC-MCNC: 3.5 G/DL (ref 2–4)
GLUCOSE SERPL-MCNC: 121 MG/DL (ref 65–100)
HCT VFR BLD AUTO: 46.8 % (ref 36.6–50.3)
HGB BLD-MCNC: 15 G/DL (ref 12.1–17)
IMM GRANULOCYTES # BLD AUTO: 0 K/UL (ref 0–0.04)
IMM GRANULOCYTES NFR BLD AUTO: 0 % (ref 0–0.5)
INR PPP: 1.2 (ref 0.9–1.1)
LYMPHOCYTES # BLD: 1.7 K/UL (ref 0.8–3.5)
LYMPHOCYTES NFR BLD: 22 % (ref 12–49)
MCH RBC QN AUTO: 26.3 PG (ref 26–34)
MCHC RBC AUTO-ENTMCNC: 32.1 G/DL (ref 30–36.5)
MCV RBC AUTO: 82 FL (ref 80–99)
MONOCYTES # BLD: 0.7 K/UL (ref 0–1)
MONOCYTES NFR BLD: 9 % (ref 5–13)
NEUTS SEG # BLD: 5.2 K/UL (ref 1.8–8)
NEUTS SEG NFR BLD: 66 % (ref 32–75)
NRBC # BLD: 0 K/UL (ref 0–0.01)
NRBC BLD-RTO: 0 PER 100 WBC
P-R INTERVAL, ECG05: 136 MS
PLATELET # BLD AUTO: 192 K/UL (ref 150–400)
PMV BLD AUTO: 9.5 FL (ref 8.9–12.9)
POTASSIUM SERPL-SCNC: 4 MMOL/L (ref 3.5–5.1)
PROT SERPL-MCNC: 7.5 G/DL (ref 6.4–8.2)
PROTHROMBIN TIME: 12 SEC (ref 9–11.1)
Q-T INTERVAL, ECG07: 334 MS
QRS DURATION, ECG06: 92 MS
QTC CALCULATION (BEZET), ECG08: 527 MS
RBC # BLD AUTO: 5.71 M/UL (ref 4.1–5.7)
SAMPLES BEING HELD,HOLD: NORMAL
SODIUM SERPL-SCNC: 139 MMOL/L (ref 136–145)
SOURCE, COVRS: NORMAL
SPECIMEN SOURCE, FCOV2M: NORMAL
SPECIMEN TYPE, XMCV1T: NORMAL
THERAPEUTIC RANGE,PTTT: NORMAL SECS (ref 58–77)
VENTRICULAR RATE, ECG03: 150 BPM
WBC # BLD AUTO: 7.8 K/UL (ref 4.1–11.1)

## 2021-01-07 PROCEDURE — 96374 THER/PROPH/DIAG INJ IV PUSH: CPT

## 2021-01-07 PROCEDURE — 99285 EMERGENCY DEPT VISIT HI MDM: CPT | Performed by: INTERNAL MEDICINE

## 2021-01-07 PROCEDURE — C1893 INTRO/SHEATH, FIXED,NON-PEEL: HCPCS | Performed by: INTERNAL MEDICINE

## 2021-01-07 PROCEDURE — 77030035291 HC TBNG PMP SMARTABLATE J&J -B: Performed by: INTERNAL MEDICINE

## 2021-01-07 PROCEDURE — 77030026438 HC STYL ET INTUB CARD -A: Performed by: ANESTHESIOLOGY

## 2021-01-07 PROCEDURE — 74011000250 HC RX REV CODE- 250: Performed by: EMERGENCY MEDICINE

## 2021-01-07 PROCEDURE — 93653 COMPRE EP EVAL TX SVT: CPT | Performed by: INTERNAL MEDICINE

## 2021-01-07 PROCEDURE — C1760 CLOSURE DEV, VASC: HCPCS | Performed by: INTERNAL MEDICINE

## 2021-01-07 PROCEDURE — 93613 INTRACARDIAC EPHYS 3D MAPG: CPT | Performed by: INTERNAL MEDICINE

## 2021-01-07 PROCEDURE — 74011000250 HC RX REV CODE- 250: Performed by: NURSE ANESTHETIST, CERTIFIED REGISTERED

## 2021-01-07 PROCEDURE — C1732 CATH, EP, DIAG/ABL, 3D/VECT: HCPCS | Performed by: INTERNAL MEDICINE

## 2021-01-07 PROCEDURE — 85025 COMPLETE CBC W/AUTO DIFF WBC: CPT

## 2021-01-07 PROCEDURE — 36415 COLL VENOUS BLD VENIPUNCTURE: CPT

## 2021-01-07 PROCEDURE — 85730 THROMBOPLASTIN TIME PARTIAL: CPT

## 2021-01-07 PROCEDURE — C1769 GUIDE WIRE: HCPCS | Performed by: INTERNAL MEDICINE

## 2021-01-07 PROCEDURE — 77030008684 HC TU ET CUF COVD -B: Performed by: ANESTHESIOLOGY

## 2021-01-07 PROCEDURE — 93662 INTRACARDIAC ECG (ICE): CPT | Performed by: INTERNAL MEDICINE

## 2021-01-07 PROCEDURE — 85610 PROTHROMBIN TIME: CPT

## 2021-01-07 PROCEDURE — 77030020506 HC NDL TRNSPTL NRG BAYL -F: Performed by: INTERNAL MEDICINE

## 2021-01-07 PROCEDURE — 77030039046 HC PAD DEFIB RADIOTRNSPNT CNMD -B: Performed by: INTERNAL MEDICINE

## 2021-01-07 PROCEDURE — 99285 EMERGENCY DEPT VISIT HI MDM: CPT

## 2021-01-07 PROCEDURE — 80053 COMPREHEN METABOLIC PANEL: CPT

## 2021-01-07 PROCEDURE — 77030027107 HC PTCH EXT REF CARTO3 J&J -F: Performed by: INTERNAL MEDICINE

## 2021-01-07 PROCEDURE — 74011250636 HC RX REV CODE- 250/636: Performed by: NURSE ANESTHETIST, CERTIFIED REGISTERED

## 2021-01-07 PROCEDURE — 77030029065 HC DRSG HEMO QCLOT ZMED -B: Performed by: INTERNAL MEDICINE

## 2021-01-07 PROCEDURE — C1894 INTRO/SHEATH, NON-LASER: HCPCS | Performed by: INTERNAL MEDICINE

## 2021-01-07 PROCEDURE — C1759 CATH, INTRA ECHOCARDIOGRAPHY: HCPCS | Performed by: INTERNAL MEDICINE

## 2021-01-07 PROCEDURE — 76060000034 HC ANESTHESIA 1.5 TO 2 HR: Performed by: INTERNAL MEDICINE

## 2021-01-07 PROCEDURE — 87635 SARS-COV-2 COVID-19 AMP PRB: CPT

## 2021-01-07 PROCEDURE — 93005 ELECTROCARDIOGRAM TRACING: CPT

## 2021-01-07 PROCEDURE — 77030013797 HC KT TRNSDUC PRSSR EDWD -A: Performed by: INTERNAL MEDICINE

## 2021-01-07 PROCEDURE — P9045 ALBUMIN (HUMAN), 5%, 250 ML: HCPCS | Performed by: NURSE ANESTHETIST, CERTIFIED REGISTERED

## 2021-01-07 RX ORDER — SODIUM CHLORIDE 9 MG/ML
INJECTION, SOLUTION INTRAVENOUS
Status: DISCONTINUED | OUTPATIENT
Start: 2021-01-07 | End: 2021-01-07 | Stop reason: HOSPADM

## 2021-01-07 RX ORDER — ONDANSETRON 2 MG/ML
4 INJECTION INTRAMUSCULAR; INTRAVENOUS
Status: DISCONTINUED | OUTPATIENT
Start: 2021-01-07 | End: 2021-01-07 | Stop reason: HOSPADM

## 2021-01-07 RX ORDER — ROCURONIUM BROMIDE 10 MG/ML
INJECTION, SOLUTION INTRAVENOUS AS NEEDED
Status: DISCONTINUED | OUTPATIENT
Start: 2021-01-07 | End: 2021-01-07 | Stop reason: HOSPADM

## 2021-01-07 RX ORDER — SODIUM CHLORIDE 0.9 % (FLUSH) 0.9 %
5-40 SYRINGE (ML) INJECTION AS NEEDED
Status: DISCONTINUED | OUTPATIENT
Start: 2021-01-07 | End: 2021-01-07 | Stop reason: HOSPADM

## 2021-01-07 RX ORDER — SODIUM CHLORIDE 0.9 % (FLUSH) 0.9 %
5-40 SYRINGE (ML) INJECTION EVERY 8 HOURS
Status: DISCONTINUED | OUTPATIENT
Start: 2021-01-07 | End: 2021-01-07 | Stop reason: HOSPADM

## 2021-01-07 RX ORDER — ONDANSETRON 2 MG/ML
INJECTION INTRAMUSCULAR; INTRAVENOUS AS NEEDED
Status: DISCONTINUED | OUTPATIENT
Start: 2021-01-07 | End: 2021-01-07 | Stop reason: HOSPADM

## 2021-01-07 RX ORDER — SODIUM CHLORIDE, SODIUM LACTATE, POTASSIUM CHLORIDE, CALCIUM CHLORIDE 600; 310; 30; 20 MG/100ML; MG/100ML; MG/100ML; MG/100ML
INJECTION, SOLUTION INTRAVENOUS
Status: DISCONTINUED | OUTPATIENT
Start: 2021-01-07 | End: 2021-01-07 | Stop reason: HOSPADM

## 2021-01-07 RX ORDER — FENTANYL CITRATE 50 UG/ML
INJECTION, SOLUTION INTRAMUSCULAR; INTRAVENOUS AS NEEDED
Status: DISCONTINUED | OUTPATIENT
Start: 2021-01-07 | End: 2021-01-07 | Stop reason: HOSPADM

## 2021-01-07 RX ORDER — DILTIAZEM HYDROCHLORIDE 5 MG/ML
25 INJECTION INTRAVENOUS
Status: COMPLETED | OUTPATIENT
Start: 2021-01-07 | End: 2021-01-07

## 2021-01-07 RX ORDER — ALBUMIN HUMAN 50 G/1000ML
SOLUTION INTRAVENOUS AS NEEDED
Status: DISCONTINUED | OUTPATIENT
Start: 2021-01-07 | End: 2021-01-07 | Stop reason: HOSPADM

## 2021-01-07 RX ORDER — MIDAZOLAM HYDROCHLORIDE 1 MG/ML
INJECTION, SOLUTION INTRAMUSCULAR; INTRAVENOUS AS NEEDED
Status: DISCONTINUED | OUTPATIENT
Start: 2021-01-07 | End: 2021-01-07 | Stop reason: HOSPADM

## 2021-01-07 RX ORDER — DEXAMETHASONE SODIUM PHOSPHATE 4 MG/ML
INJECTION, SOLUTION INTRA-ARTICULAR; INTRALESIONAL; INTRAMUSCULAR; INTRAVENOUS; SOFT TISSUE AS NEEDED
Status: DISCONTINUED | OUTPATIENT
Start: 2021-01-07 | End: 2021-01-07 | Stop reason: HOSPADM

## 2021-01-07 RX ORDER — SUCCINYLCHOLINE CHLORIDE 20 MG/ML
INJECTION INTRAMUSCULAR; INTRAVENOUS AS NEEDED
Status: DISCONTINUED | OUTPATIENT
Start: 2021-01-07 | End: 2021-01-07 | Stop reason: HOSPADM

## 2021-01-07 RX ORDER — PHENYLEPHRINE HCL IN 0.9% NACL 0.4MG/10ML
SYRINGE (ML) INTRAVENOUS AS NEEDED
Status: DISCONTINUED | OUTPATIENT
Start: 2021-01-07 | End: 2021-01-07 | Stop reason: HOSPADM

## 2021-01-07 RX ORDER — PROPOFOL 10 MG/ML
INJECTION, EMULSION INTRAVENOUS AS NEEDED
Status: DISCONTINUED | OUTPATIENT
Start: 2021-01-07 | End: 2021-01-07 | Stop reason: HOSPADM

## 2021-01-07 RX ORDER — LIDOCAINE HYDROCHLORIDE 20 MG/ML
INJECTION, SOLUTION EPIDURAL; INFILTRATION; INTRACAUDAL; PERINEURAL AS NEEDED
Status: DISCONTINUED | OUTPATIENT
Start: 2021-01-07 | End: 2021-01-07 | Stop reason: HOSPADM

## 2021-01-07 RX ADMIN — LIDOCAINE HYDROCHLORIDE 60 MG: 20 INJECTION, SOLUTION EPIDURAL; INFILTRATION; INTRACAUDAL; PERINEURAL at 11:06

## 2021-01-07 RX ADMIN — Medication 80 MCG: at 11:11

## 2021-01-07 RX ADMIN — MIDAZOLAM 2 MG: 1 INJECTION INTRAMUSCULAR; INTRAVENOUS at 11:05

## 2021-01-07 RX ADMIN — PROPOFOL 150 MG: 10 INJECTION, EMULSION INTRAVENOUS at 11:06

## 2021-01-07 RX ADMIN — DEXAMETHASONE SODIUM PHOSPHATE 4 MG: 4 INJECTION, SOLUTION INTRAMUSCULAR; INTRAVENOUS at 11:26

## 2021-01-07 RX ADMIN — Medication 80 MCG: at 12:09

## 2021-01-07 RX ADMIN — ONDANSETRON HYDROCHLORIDE 4 MG: 2 INJECTION, SOLUTION INTRAMUSCULAR; INTRAVENOUS at 12:21

## 2021-01-07 RX ADMIN — SODIUM CHLORIDE: 900 INJECTION, SOLUTION INTRAVENOUS at 10:58

## 2021-01-07 RX ADMIN — Medication 80 MCG: at 11:10

## 2021-01-07 RX ADMIN — FENTANYL CITRATE 25 MCG: 50 INJECTION, SOLUTION INTRAMUSCULAR; INTRAVENOUS at 12:13

## 2021-01-07 RX ADMIN — PROPOFOL 40 MG: 10 INJECTION, EMULSION INTRAVENOUS at 12:16

## 2021-01-07 RX ADMIN — ROCURONIUM BROMIDE 5 MG: 10 SOLUTION INTRAVENOUS at 11:06

## 2021-01-07 RX ADMIN — ALBUMIN (HUMAN) 250 ML: 12.5 INJECTION, SOLUTION INTRAVENOUS at 11:22

## 2021-01-07 RX ADMIN — PHENYLEPHRINE HYDROCHLORIDE 70 MCG/MIN: 10 INJECTION INTRAVENOUS at 11:12

## 2021-01-07 RX ADMIN — FENTANYL CITRATE 50 MCG: 50 INJECTION, SOLUTION INTRAMUSCULAR; INTRAVENOUS at 11:06

## 2021-01-07 RX ADMIN — DILTIAZEM HYDROCHLORIDE 25 MG: 5 INJECTION INTRAVENOUS at 08:10

## 2021-01-07 RX ADMIN — SODIUM CHLORIDE, POTASSIUM CHLORIDE, SODIUM LACTATE AND CALCIUM CHLORIDE: 600; 310; 30; 20 INJECTION, SOLUTION INTRAVENOUS at 11:47

## 2021-01-07 RX ADMIN — SUCCINYLCHOLINE CHLORIDE 180 MG: 20 INJECTION, SOLUTION INTRAMUSCULAR; INTRAVENOUS at 11:07

## 2021-01-07 RX ADMIN — Medication 80 MCG: at 11:12

## 2021-01-07 RX ADMIN — SODIUM CHLORIDE, POTASSIUM CHLORIDE, SODIUM LACTATE AND CALCIUM CHLORIDE: 600; 310; 30; 20 INJECTION, SOLUTION INTRAVENOUS at 12:20

## 2021-01-07 NOTE — PROCEDURES
Comprehensive EPS performed  Activation/voltage/Ripple/Coherent mapping performed of tachycardia with TCL of 200 msec. Voltage demonstrated block across the CTI  There was an extensive area of low voltage/scar along the right postero-septal wall from the SVC to the IVC/septum  An area of slow conduction mid-RA was identified with delayed/fractionated potentials where ablation was performed and resulted in termination of the tachycardia nearly immediately following onset of RF energy.    Bidirectional conduction block was demonstrated across the CTI      Conclusion:  Atypical right atrial flutter  Continue eliquis x 1 month  Continue monitor       Khoa Yanez MD

## 2021-01-07 NOTE — ED TRIAGE NOTES
Patient reports he had ablation done 12/22/2020 and he went in to rapid heart rate again on 12/23/20. He went to see Dr. Danilo Urias yesterday and was told to come to the ED today for ablation procedure. Denies chest pain, nausea and vomiting. Reports he is mildly short of breath sometimes.

## 2021-01-07 NOTE — ED PROVIDER NOTES
HPI     Pt is a 63 y.o. M with PMH of recurrent Aflutter despite 2 ablations with the last on 12/22/20 presenting to ED with c/o rapid heart rate again since 12/23/20. He saw Dr. Raheem Desai on yesterday. Pt says he was advised to come to ED today for procedure. He has been compliant with medications (diltiazem and eliquis). He c/o occasional dyspnea. He denies chest pain, N/V. No other complaints at this time.       Past Medical History:   Diagnosis Date    Abnormal echocardiogram 10/28/2010    Atrial flutter (HCC)     s/p ablation     Dyslipidemia     Gout     High blood pressure     High blood pressure     Obesity 10/28/2010    Prediabetes     [facility] ([facility] [address]     S/P ablation of atrial flutter     Supraventricular tachycardia ([facility] [address] 10/28/2010       Past Surgical History:   Procedure Laterality Date    HX ACL RECONSTRUCTION  6/2013    menniscus    HX ROTATOR CUFF REPAIR Left 2005    HX ROTATOR CUFF REPAIR Right 2007    HX WISDOM TEETH EXTRACTION      VT CARDIAC SURG PROCEDURE UNLIST  2016    cardiac ablation    VT COMPRE ELECTROPHYSIOL XM W/LEFT ATRIAL PACNG/REC N/A 12/22/2020    Lt Atrial Pace & Record During Ep Study performed by Tosha Palacios MD at [address], [facility] CATH LAB    VT EPHYS EVAL W/ABLATION [facility] ARRHYTHMIA N/A 12/22/2020    ABLATION SVT performed by Tosha Palacios MD at [address], [facility] CATH LAB    VT INTRACARDIAC ELECTROPHYSIOLOGIC 3D MAPPING N/A 12/22/2020    Ep 3d Mapping performed by Tosha Palacios MD at [address] [facility], [facility] [address] CATH LAB         Family History:   Problem Relation Age of Onset    No Known Problems Mother     No Known Problems Father     Cancer Sister 43        breast    No Known Problems Sister        Social History     Socioeconomic History    Marital status: SINGLE     Spouse name: Not on file    Number of children: Not on file    Years of education: Not on file    Highest education level: Not on file   Occupational History    Not on file   Social Needs    Financial resource strain: Not on file    Food insecurity     Worry: Not on file     Inability: Not on file    Transportation needs     Medical: Not on file     Non-medical: Not on file   Tobacco Use    Smoking status: Never Smoker    Smokeless tobacco: Never Used   Substance and Sexual Activity    Alcohol use: Not Currently     Alcohol/week: 16.0 standard drinks     Types: 16 Cans of beer per week     Comment: reports not drinking during the week, only on weekends    Drug use: No    Sexual activity: Not on file   Lifestyle    Physical activity     Days per week: Not on file     Minutes per session: Not on file    Stress: Not on file   Relationships    Social connections     Talks on phone: Not on file     Gets together: Not on file     Attends Congregation service: Not on file     Active member of club or organization: Not on file     Attends meetings of clubs or organizations: Not on file     Relationship status: Not on file    Intimate partner violence     Fear of current or ex partner: Not on file     Emotionally abused: Not on file     Physically abused: Not on file     Forced sexual activity: Not on file   Other Topics Concern    Not on file   Social History Narrative    Not on file         ALLERGIES: Percocet [oxycodone-acetaminophen]    Review of Systems   Constitutional: Negative for chills, diaphoresis and fever. HENT: Negative for congestion and trouble swallowing. Eyes: Negative for photophobia and visual disturbance. Respiratory: Positive for shortness of breath. Negative for cough and chest tightness. Cardiovascular: Positive for palpitations. Negative for chest pain and leg swelling. Gastrointestinal: Negative for abdominal pain, diarrhea, nausea and vomiting. Genitourinary: Negative for difficulty urinating, dysuria, flank pain and frequency. Musculoskeletal: Negative for back pain and myalgias. Skin: Negative for rash and wound.    Neurological: Negative for dizziness, weakness, light-headedness and headaches. Hematological: Negative for adenopathy. Does not bruise/bleed easily. Psychiatric/Behavioral: Negative for agitation and confusion. All other systems reviewed and are negative. Vitals:    01/07/21 0728   BP: (!) 144/99   Pulse: (!) 151   Resp: 18   Temp: 98.7 °F (37.1 °C)   SpO2: 97%            Physical Exam  Vitals signs and nursing note reviewed. Constitutional:       General: He is not in acute distress. Appearance: He is well-developed. He is not diaphoretic. HENT:      Head: Normocephalic. Eyes:      Conjunctiva/sclera: Conjunctivae normal.      Pupils: Pupils are equal, round, and reactive to light. Neck:      Musculoskeletal: Normal range of motion and neck supple. Vascular: No JVD. Cardiovascular:      Rate and Rhythm: Regular rhythm. Tachycardia present. Heart sounds: Normal heart sounds. Pulmonary:      Effort: Pulmonary effort is normal.      Breath sounds: Normal breath sounds. Abdominal:      General: Bowel sounds are normal. There is no distension. Palpations: Abdomen is soft. Tenderness: There is no abdominal tenderness. Musculoskeletal: Normal range of motion. General: No tenderness or deformity. Lymphadenopathy:      Cervical: No cervical adenopathy. Skin:     General: Skin is warm and dry. Capillary Refill: Capillary refill takes less than 2 seconds. Findings: No erythema or rash. Neurological:      Mental Status: He is alert and oriented to person, place, and time. Cranial Nerves: No cranial nerve deficit. Sensory: No sensory deficit. MDM       Procedures    ED EKG interpretation:  Rhythm: sinus tachycardia with episodes of flutter and frequent PVCs. Rate 150. normal QRS duration. Nonspecific ST and T wave abnormality. EKG documented by Waldo Addison MD, scribe, as interpreted by Anshul Phillips MD, ED MD.    7:49 AM  Consult placed to Dr. Maco Eng.   Will get blood work and IV. Pt took home medications already. Pt with . Will consider diltiazem x 1 injection to aid in rate control and help with current symptoms. CONSULT NOTE:  8:23 AM Andie Hayes MD spoke with Dr. Nellie Syed for Cardiology. Discussed available diagnostic tests and clinical findings. Dr. Fabian Crawley recommends he will do ablation. Avoid other meds and keep NPO.       Heidy Alvarez MD

## 2021-01-07 NOTE — DISCHARGE INSTRUCTIONS
Electrophysiology Study (EPS)/Cardiac Ablation   Discharge Instructions      You have just had a Cardiac Ablation for: atypical atrial flutter.          There were catheters temporarily placed in your heart through a puncture in the Veins and/or Arteries in your groin.        WHAT TO EXPECT     • If you have had a Cardiac Ablation please be aware that you may experience mild chest pain that will resolve within 24-48 hours.  If the Chest Pain begins radiating down your shoulders or arms, becomes severe or breathing becomes difficult, call 911 or go to the closest emergency room.     • Mild to moderate, non-painful, bruising or mild swelling at the puncture site is not un-common, and will resolve in 7 - 14 days, and may extend down your thigh as it heals.    • If a closure device was used to seal your artery or vein, a separate pamphlet will be given to you with these discharge instructions.  It is very important that you review the information in the pamphlet for different restrictions and precautions.    • You have a small gauze dressing applied to the puncture site in your groin.  You may remove this the following morning.   • You MAY receive a 30 day heart monitor in the mail to wear after your procedure. This is to evaluate your heart rhythm post ablation.      MEDICATIONS     • Take only the medications prescribed to you at discharge.      ACTIVITY     • A responsible adult must take you home.  Do not drive a car for 24 hours.    • Rest quietly for the remainder of the day.  • Do not lift anything greater than 10 pounds for 3 days.  • Limit bending at the puncture site and use of stairs for at least 3 days.  • You may remove the bandage and shower the morning after the procedure.  Do not take a bath for 3 days.        SYMPTOMS THAT NEED TO BE REPORTED IMMEDIATELY     • Bleeding at the puncture site.  If there is bleeding, lie down and hold firm direct pressure for at least 5 minutes.  If the bleeding does  not stop, go to the closest emergency room, or call 911.  Temperature more than 100.5 F.   Redness or warmth at the puncture site.  Increasing pain, numbness, coolness or blue discoloration of the extremity where the puncture is located.  Pulsating mass at the puncture site.  A new lump at the puncture site, or increasing swelling at the site. Please be aware that a pea or marble sized lump is normal, anything larger or increasing in size should be reported.  Bruising at the puncture site that enlarges or becomes painful (some bruising at the site is common and will go away in 7-14 days).  Rapid heart rate or palpitations.  Dizziness, lightheadedness, fainting.  REMEMBER: If you feel something is an emergency or cannot be handled over the phone, call 911 or go to the closest emergency room.       FOLLOW UP CARE     Ruma Solis NP in 2 weeks  Dr. Tammy Marte in 3 months        Evangelista Kim MD  Cardiac Electrophysiology / Cardiology    [place] [address].  [address] [place] [address]  Humaira Hebert [address] [place] [address]  [phone] / [phone] Fax   [phone] / [phone] Fax

## 2021-01-07 NOTE — DISCHARGE SUMMARY
Cardiac Electrophysiology Discharge Summary       Patient ID:  Carlos Gray  [id number]  21 y.o.  1955    Admit Date: 1/7/2021    Discharge Date: 1/7/2021     Admitting Physician: No admitting provider for patient encounter. Discharge Physician: Heber Morrow MD    Admission Diagnoses: No admission diagnoses are documented for this encounter. Discharge Diagnoses: Active Problems:    * No active hospital problems. *      Discharge Condition: stable    Cardiology Procedures this Admission:  Atrial flutter ablation    Hospital Course: Patient underwent above procedure without complication and remained stable without acute events. Discharge Exam:  Visit Vitals  BP (!) 129/91   Pulse (!) 112   Temp 98.7 °F (37.1 °C)   Resp 24   SpO2 95%       Abdomen: soft, non-tender  Extremities: extremities normal  Heart: regular rate and rhythm  Lungs: clear to auscultation bilaterally  Neck: no JVD  Neurologic: Grossly normal  Pulses: 2+ and symmetric    Disposition: Home    Patient Instructions:   Current Discharge Medication List      CONTINUE these medications which have NOT CHANGED    Details   ibuprofen (MOTRIN) 600 mg tablet Take 600 mg by mouth every six (6) hours as needed for Pain. apixaban (Eliquis) 5 mg tablet Take 1 Tab by mouth two (2) times a day. Qty: 60 Tab, Refills: 0      allopurinoL (ZYLOPRIM) 100 mg tablet Take 100 mg by mouth daily. Switched from [address] [place] [address] to allopurinol on 7/17/2020             Referenced discharge instructions provided by nursing for diet and activity.     Follow-up with Heber Morrow MD             Signed:  Paty Huang MD  Cardiac Electrophysiology  1/7/2021  12:23 PM

## 2021-01-07 NOTE — PROGRESS NOTES
TRANSFER - IN REPORT:    Verbal report received from ALEXX Givens RN on Angelica Napier, Procedure EPS/Aflutter Ablation , from the Cardiac Cath lab, for routine progression of care. Report consisted of patients Situation, Background, Assessment and Recommendations(SBAR). Information from the following report(s) Procedure Summary, MAR and Recent Results was reviewed with the receiving clinician. Opportunity for questions and clarification was provided. Assessment completed upon patients arrival to [address] [place] [address] and care assumed. Cardiac Cath Lab Recovery Arrival Note:     Angelica Napier arrived to [facility] recovery area. Patient procedure= EPS/Aflutter Ablation. Patient on cardiac monitor, non-invasive blood pressure, Patient status doing well without problems. Patient is A&Ox 4. Patient reports no pain, no chest pain, no n/v. Procedure site without any bleeding and no hematoma.

## 2021-01-07 NOTE — PROGRESS NOTES
I have reviewed discharge instructions with the patient and spouse. The patient and spouse verbalized understanding. Copy of discharge instructions given to patient. Patient's wife is present to take patient home. Right groin no bleeding and no hematoma.

## 2021-01-07 NOTE — PROGRESS NOTES
Markus Sr ambulated @ 1430 (time) approximately 100 feet. Patient tolerated ambulation without adverse advents. right (right/left, groin/arm)  without bleeding or hematoma noted.

## 2021-01-07 NOTE — ANESTHESIA POSTPROCEDURE EVALUATION
Procedure(s):  Ep 3d Mapping  Ablation A-Flutter  Intracardiac Echocardiogram.    general    Anesthesia Post Evaluation      Multimodal analgesia: multimodal analgesia used between 6 hours prior to anesthesia start to PACU discharge  Patient location during evaluation: PACU  Patient participation: complete - patient participated  Level of consciousness: awake  Pain score: 2  Pain management: adequate  Airway patency: patent  Anesthetic complications: no  Cardiovascular status: acceptable  Respiratory status: acceptable  Hydration status: acceptable  Comments: I have evaluated the patient and meets criteria for discharge from PACU. Dwight Lanza MD  Post anesthesia nausea and vomiting:  controlled  Final Post Anesthesia Temperature Assessment:  Normothermia (36.0-37.5 degrees C)      INITIAL Post-op Vital signs:   Vitals Value Taken Time   /76 01/07/21 1415   Temp 36.7 °C (98.1 °F) 01/07/21 1245   Pulse 101 01/07/21 1427   Resp 27 01/07/21 1427   SpO2 99 % 01/07/21 1419   Vitals shown include unvalidated device data.

## 2021-01-07 NOTE — H&P
HISTORY OF PRESENTING ILLNESS      Kiah Vail is a 63 y.o. male who underwent AFL ablation in the past with subsequent recurrence and repeat CTI ablation last month whose monitor shows recurrent AFL (as well as PVCs). He is very symptomatic with HERRERA. Has been compliant with medications including anticoagulation since his procedure.  He was seen in office yesterday with plans for outpatient ablation however now presents with acceleration of his heart rates.           PAST MEDICAL HISTORY     Past Medical History:   Diagnosis Date    Abnormal echocardiogram 10/28/2010    Atrial flutter ([address]     s/p ablation     Dyslipidemia     Gout     High blood pressure     High blood pressure     Obesity 10/28/2010    Prediabetes     Prostate cancer ([address]     S/P ablation of atrial flutter     Supraventricular tachycardia ([address] 10/28/2010           PAST SURGICAL HISTORY     Past Surgical History:   Procedure Laterality Date    HX ACL RECONSTRUCTION  6/2013    menniscus    HX ROTATOR CUFF REPAIR Left 2005    HX ROTATOR CUFF REPAIR Right 2007    HX WISDOM TEETH EXTRACTION      NM CARDIAC SURG PROCEDURE UNLIST  2016    cardiac ablation    NM COMPRE ELECTROPHYSIOL XM W/LEFT ATRIAL PACNG/REC N/A 12/22/2020    Lt Atrial Pace & Record During Ep Study performed by Carolee Dave MD at [address] CATH LAB    NM EPHYS EVAL W/ABLATION SUPRAVENT ARRHYTHMIA N/A 12/22/2020    ABLATION SVT performed by Carolee Dave MD at [address] CATH LAB    NM INTRACARDIAC ELECTROPHYSIOLOGIC 3D MAPPING N/A 12/22/2020    Ep 3d Mapping performed by Carolee Dave MD at [address] CATH LAB          ALLERGIES     Allergies   Allergen Reactions    Percocet [Oxycodone-Acetaminophen] Itching     Pt has used hydrocodone          FAMILY HISTORY     Family History   Problem Relation Age of Onset    No Known Problems Mother     No Known Problems Father     Cancer Sister 43        breast    No Known Problems Sister negative for cardiac disease       SOCIAL HISTORY     Social History     Socioeconomic History    Marital status: SINGLE     Spouse name: Not on file    Number of children: Not on file    Years of education: Not on file    Highest education level: Not on file   Tobacco Use    Smoking status: Never Smoker    Smokeless tobacco: Never Used   Substance and Sexual Activity    Alcohol use: Not Currently     Alcohol/week: 16.0 standard drinks     Types: 16 Cans of beer per week     Comment: reports not drinking during the week, only on weekends    Drug use: No         MEDICATIONS     Current Facility-Administered Medications   Medication Dose Route Frequency    sodium chloride (NS) flush 5-40 mL  5-40 mL IntraVENous Q8H    sodium chloride (NS) flush 5-40 mL  5-40 mL IntraVENous PRN     Current Outpatient Medications   Medication Sig    ibuprofen (MOTRIN) 600 mg tablet Take 600 mg by mouth every six (6) hours as needed for Pain.  apixaban (Eliquis) 5 mg tablet Take 1 Tab by mouth two (2) times a day.  allopurinoL (ZYLOPRIM) 100 mg tablet Take 100 mg by mouth daily. Switched from [place] to allopurinol on 7/17/2020       I have reviewed the nurses notes, vitals, problem list, allergy list, medical history, family, social history and medications. REVIEW OF SYMPTOMS      General: Pt denies excessive weight gain or loss. Pt is able to conduct ADL's  HEENT: Denies blurred vision, headaches, hearing loss, epistaxis and difficulty swallowing. Respiratory: Denies cough, congestion, shortness of breath, HERRERA, wheezing or stridor.   Cardiovascular: Denies precordial pain, palpitations, edema or PND  Gastrointestinal: Denies poor appetite, indigestion, abdominal pain or blood in stool  Genitourinary: Denies hematuria, dysuria, increased urinary frequency  Musculoskeletal: Denies joint pain or swelling from muscles or joints  Neurologic: Denies tremor, paresthesias, headache, or sensory motor disturbance  Psychiatric: Denies confusion, insomnia, depression  Integumentray: Denies rash, itching or ulcers. Hematologic: Denies easy bruising, bleeding       PHYSICAL EXAMINATION      Vitals: see vitals section  General: Well developed, in no acute distress. HEENT: No jaundice, oral mucosa moist, no oral ulcers  Neck: Supple, no stiffness, no lymphadenopathy, supple  Heart:  irreg irreg, no murmur, gallop or rub, no jugular venous distention  Respiratory: Clear bilaterally x 4, no wheezing or rales  Abdomen:   Soft, non-tender, bowel sounds are active. Extremities:  No edema, normal cap refill, no cyanosis. Musculoskeletal: No clubbing, no deformities  Neuro: A&Ox3, speech clear, gait stable, cooperative, no focal neurologic deficits  Skin: Skin color is normal. No rashes or lesions. Non diaphoretic, moist.  Vascular: 2+ pulses symmetric in all extremities       DIAGNOSTIC DATA      EKG: AF vs AFL with PVCs       LABORATORY DATA      Lab Results   Component Value Date/Time    WBC 7.8 01/07/2021 07:52 AM    HGB 15.0 01/07/2021 07:52 AM    HCT 46.8 01/07/2021 07:52 AM    PLATELET 033 96/68/7091 07:52 AM    MCV 82.0 01/07/2021 07:52 AM      Lab Results   Component Value Date/Time    Sodium 138 12/15/2020 01:59 PM    Potassium 4.3 12/15/2020 01:59 PM    Chloride 101 12/15/2020 01:59 PM    CO2 22 12/15/2020 01:59 PM    Anion gap 9 11/20/2017 09:42 AM    Glucose 119 (H) 12/15/2020 01:59 PM    BUN 16 12/15/2020 01:59 PM    Creatinine 1.13 12/15/2020 01:59 PM    BUN/Creatinine ratio 14 12/15/2020 01:59 PM    GFR est AA 80 12/15/2020 01:59 PM    GFR est non-AA 69 12/15/2020 01:59 PM    Calcium 9.7 12/15/2020 01:59 PM           ASSESSMENT         1. Atrial flutter              A. CTI ablation x 2  2. Atrial fibrillation  3. PVCs   A. Inferior axis Tx at [facility].     Patient with recurrent tachycardia despite recent CTI ablation. Telemetry more c/w poss AF following administration of IV diltiazem.  Will admit for further management. NPO now. Plan for AFL ablation as well as possible AF ablation if found to have AF during EPS.          Ezra Robles MD  Cardiac Electrophysiology / Cardiology    [address]  [facility], [address]  [phone] / [phone] Fax   [phone] / [phone] Fax

## 2021-01-07 NOTE — Clinical Note
Sheath #1: Closed using Perclose. Site secured by Tegaderm and transparent dressing. Wound Care: Vaseline

## 2021-01-08 ENCOUNTER — TELEPHONE (OUTPATIENT)
Dept: CARDIOLOGY CLINIC | Age: 66
End: 2021-01-08

## 2021-01-08 NOTE — TELEPHONE ENCOUNTER
Called pt today and nany his fu appt pt stated that he feels great but when he stands he feels pressure in his sinus area is this ok please call him at [phone]

## 2021-01-11 NOTE — TELEPHONE ENCOUNTER
Returned patient call, ID verified using two patient identifiers. Informed him of Alise Willis NP recommendation to try Claritin if the sinus pressure has not improved. Patient states that the pressure in his head has improved since his last call. He states he is still feeling a little bit short of breath. He has also had a few palpitations but overall he is feeling much better. Clarified patient's upcoming appointments. Patient to call with any other questions.     Future Appointments   Date Time Provider Marizol Thais   1/21/2021  8:40 AM AKILAH Huang BS AMB   3/15/2021  8:40 AM Amy De La Rosa MD [facility] BS AMB

## 2021-01-13 ENCOUNTER — TELEPHONE (OUTPATIENT)
Dept: CARDIOLOGY CLINIC | Age: 66
End: 2021-01-13

## 2021-01-13 NOTE — TELEPHONE ENCOUNTER
Monitor report with atypical atrial flutter and PVCs this morning, patient asymptomatic- feeling improvement since his procedure. Instructed him to restart his Diltiazem 120mg daily. Will arrange for him to be moved to Dr. Eryn Galvan schedule next week.     Kaleb Evans NP

## 2021-01-15 NOTE — ANESTHESIA POSTPROCEDURE EVALUATION
Procedure(s):  ABLATION SVT  Ep 3d Mapping  Lt Atrial Pace & Record During Ep Study.     general    <[email or web address]>    INITIAL Post-op Vital signs:   Vitals Value Taken Time   /76 01/07/21 1415   Temp 36.4 °C (97.6 °F) 01/07/21 1305   Pulse 96 01/07/21 1415   Resp 22 01/07/21 1415   SpO2 96 % 01/07/21 1415

## 2021-01-15 NOTE — ANESTHESIA PREPROCEDURE EVALUATION
Relevant Problems   No relevant active problems       Anesthetic History   No history of anesthetic complications            Review of Systems / Medical History  Patient summary reviewed, nursing notes reviewed and pertinent labs reviewed    Pulmonary  Within defined limits                 Neuro/Psych   Within defined limits           Cardiovascular  Within defined limits  Hypertension        Dysrhythmias : atrial fibrillation           GI/Hepatic/Renal  Within defined limits              Endo/Other  Within defined limits      Obesity     Other Findings              Physical Exam    Airway  Mallampati: II  TM Distance: > 6 cm  Neck ROM: normal range of motion   Mouth opening: Normal     Cardiovascular  Regular rate and rhythm,  S1 and S2 normal,  no murmur, click, rub, or gallop  Rhythm: irregular           Dental  No notable dental hx       Pulmonary  Breath sounds clear to auscultation               Abdominal  GI exam deferred       Other Findings            Anesthetic Plan    ASA: 3  Anesthesia type: general          Induction: Intravenous  Anesthetic plan and risks discussed with: Patient

## 2021-01-22 ENCOUNTER — OFFICE VISIT (OUTPATIENT)
Dept: CARDIOLOGY CLINIC | Age: 66
End: 2021-01-22
Payer: COMMERCIAL

## 2021-01-22 VITALS
HEIGHT: 73 IN | SYSTOLIC BLOOD PRESSURE: 144 MMHG | DIASTOLIC BLOOD PRESSURE: 90 MMHG | OXYGEN SATURATION: 98 % | WEIGHT: 250 LBS | BODY MASS INDEX: 33.13 KG/M2 | HEART RATE: 102 BPM

## 2021-01-22 DIAGNOSIS — I10 ESSENTIAL HYPERTENSION: ICD-10-CM

## 2021-01-22 DIAGNOSIS — I48.4 ATYPICAL ATRIAL FLUTTER (HCC): Primary | ICD-10-CM

## 2021-01-22 PROCEDURE — 99215 OFFICE O/P EST HI 40 MIN: CPT | Performed by: INTERNAL MEDICINE

## 2021-01-22 RX ORDER — DILTIAZEM HYDROCHLORIDE 30 MG/1
TABLET, FILM COATED ORAL 4 TIMES DAILY
COMMUNITY
End: 2021-01-28 | Stop reason: ALTCHOICE

## 2021-01-22 RX ORDER — SODIUM CHLORIDE 0.9 % (FLUSH) 0.9 %
5-40 SYRINGE (ML) INJECTION EVERY 8 HOURS
Status: CANCELLED | OUTPATIENT
Start: 2021-01-22

## 2021-01-22 RX ORDER — SODIUM CHLORIDE 0.9 % (FLUSH) 0.9 %
5-40 SYRINGE (ML) INJECTION AS NEEDED
Status: CANCELLED | OUTPATIENT
Start: 2021-01-22

## 2021-01-22 NOTE — PATIENT INSTRUCTIONS
You will be scheduled for the following procedure with Dr. Tammy Marte:  AFlutter ablation w/possible AFIB ablation @ [facility]      PLEASE be aware that your procedure time is a tentative time and subject to change due to emergency cases. Procedure date/time:    January 26, 2021  Time: 12:00  pm - arrive by 10:00 AM        o PRE-PROCEDURE LABS NEEDED: YES   o Forms for labwork may be given at appointment. If not, they will be mailed to you. Labs should be completed within 5-7 days of procedure. These can be done at any [facility] or [facility] lab (one is located in [address] [place] [address]. No appointment needed). o YOU MAY NEED PRE-PROCEDURE IMAGING, CHEST CTA OR CARDIAC MRI.       []  Chest CTA     []  Cardiac MRI    (hospital [facility] scheduling to call you)      Nothing to eat or drink after midnight before your procedure. ARRIVAL time:  (You will need  to be discharged home with.)     o [facility] procedures: please arrive to check in on 2nd floor two hours prior to your procedure the day of your procedure.    o [facility] procedures: arrive to check in on 1st floor near [facility] two hours prior to your procedure. Please review the following medication instructions:     Do not stop the following blood thinner prior to procedure: Eliquis     STOP YOUR DIGOXIN & DILTIAZEM 5 DAYS PRIOR. If you take any of the following Diabetic medications, please use as follows:    []  Glucophage/Metformin   Hold morning dose on day of procedure.    []  Insulin     Hold morning dose on day of procedure.    []  Lantus     Reduce dose by ½ evening before procedure. You may take all other medications as directed the morning of your procedure with a sip of water unless otherwise indicated. Please contact the office [phone] and ask for a member of Dr. Tammy Marte' team for procedure questions. There is a physician on call for the office after hours for immediate needs.      FOLLOW UP:   Your appointments will be made for you post procedure based off of discharge instructions. You may have driving restrictions for a short time after your procedure (usually 2-3 days). Pacemaker/ICD patients will be unable to have an MRI until 6 weeks after implant, NO dental work for 8 weeks after your implant.

## 2021-01-22 NOTE — PROGRESS NOTES
HISTORY OF PRESENTING ILLNESS      Medina Calzada is a 63 y.o. male who underwent AFL ablation in the past with subsequent recurrence and repeat CTI ablation last month whose monitor shows recurrent AFL (as well as PVCs). He is very symptomatic with HERRERA. Has been compliant with medications including anticoagulation since his procedure. He then underwent repeat atypical/ right atrial flutter ablation (An area of slow conduction mid-RA was identified with delayed/fractionated potentials where ablation was performed and resulted in termination of the tachycardia nearly immediately following onset of RF energy. Bidirectional conduction block was demonstrated across the CTI). Repeat monitor report with atypical atrial flutter and PVCs. He was  asymptomatic- feeling improvement since his procedure. Instructed him to restart his Diltiazem 120 mg daily and he presents for follow up. Monitor has shown persistent recurrent tachycardia despite diltiazem restart.         PAST MEDICAL HISTORY     Past Medical History:   Diagnosis Date    Abnormal echocardiogram 10/28/2010    Atrial flutter (HCC)     s/p ablation     Dyslipidemia     Gout     High blood pressure     High blood pressure     Obesity 10/28/2010    Prediabetes     Pro[address] ([facility]     S/P ablation of atrial flutter     Supraventricular tachycardia ([facility] 10/28/2010           PAST SURGICAL HISTORY     Past Surgical History:   Procedure Laterality Date    HX ACL RECONSTRUCTION  6/2013    menniscus    HX ROTATOR CUFF REPAIR Left 2005    HX ROTATOR CUFF REPAIR Right 2007    HX WISDOM TEETH EXTRACTION      INTRACARD ECHO, THER/DX INTERVENT N/A 1/7/2021    Intracardiac Echocardiogram performed [place]ales MD [place] [address] [place] [address], [facility] CATH LAB   [address] UNLIST  2016    cardiac ablation    CA COMPRE ELECTROPHYSIOL XM W/LEFT ATRIAL PACNG/REC N/A 12/22/2020    Lt Atrial Pace & Record During Ep Study performed by [facility], Destinee Paul MD at [facility], [facility] CATH LAB    OR EPHYS EVAL W/ABLATION [facility] ARRHYTHMIA N/A 12/22/2020    ABLATION SVT [facility] CATH LAB    OR EPHYS EVAL W/ABLATION SUPRAVENT ARRHYTHMIA N/A 1/7/2021    Ablation A-Flutter performed by Jerzy Panda MD at [place] [address], [facility] CATH LAB    OR INTRACARDIAC ELECTROPHYSIOLOGIC 3D MAPPING N/A 12/22/2020    Ep 3d Mapping performed by Jerzy Panda MD at [facility], [facility] CATH LAB    OR INTRACARDIAC ELECTROPHYSIOLOGIC 3D MAPPING N/A 1/7/2021    Ep 3d Mapping performed by Jerzy Panda MD at [address] [place] [address], [facility] [address] CATH LAB          ALLERGIES     Allergies   Allergen Reactions    Percocet [Oxycodone-Acetaminophen] Itching     Pt has used hydrocodone          FAMILY HISTORY     Family History   Problem Relation Age of Onset    No Known Problems Mother     No Known Problems Father     Cancer Sister 43        breast    No Known Problems Sister     negative for cardiac disease       SOCIAL HISTORY     Social History     Socioeconomic History    Marital status: SINGLE     Spouse name: Not on file    Number of children: Not on file    Years of education: Not on file    Highest education level: Not on file   Tobacco Use    Smoking status: Never Smoker    Smokeless tobacco: Never Used   Substance and Sexual Activity    Alcohol use: Not Currently     Alcohol/week: 16.0 standard drinks     Types: 16 Cans of beer per week     Comment: reports not drinking during the week, only on weekends    Drug use: No         MEDICATIONS     Current Outpatient Medications   Medication Sig    dilTIAZem IR (CARDIZEM) 30 mg tablet Take  by mouth four (4) times daily.  apixaban (Eliquis) 5 mg tablet Take 1 Tab by mouth two (2) times a day.  allopurinoL (ZYLOPRIM) 100 mg tablet Take 100 mg by mouth daily. Switched from [address] [place] [address] to allopurinol on 7/17/2020    ibuprofen (MOTRIN) 600 mg tablet Take 600 mg by mouth every six (6) hours as needed for Pain.      No current facility-administered medications for this visit. I have reviewed the nurses notes, vitals, problem list, allergy list, medical history, family, social history and medications. REVIEW OF SYMPTOMS      General: Pt denies excessive weight gain or loss. Pt is able to conduct ADL's  HEENT: Denies blurred vision, headaches, hearing loss, epistaxis and difficulty swallowing. Respiratory: Denies cough, congestion, shortness of breath, HERRERA, wheezing or stridor. Cardiovascular: Denies precordial pain, palpitations, edema or PND  Gastrointestinal: Denies poor appetite, indigestion, abdominal pain or blood in stool  Genitourinary: Denies hematuria, dysuria, increased urinary frequency  Musculoskeletal: Denies joint pain or swelling from muscles or joints  Neurologic: Denies tremor, paresthesias, headache, or sensory motor disturbance  Psychiatric: Denies confusion, insomnia, depression  Integumentray: Denies rash, itching or ulcers. Hematologic: Denies easy bruising, bleeding       PHYSICAL EXAMINATION      Vitals: see vitals section  General: Well developed, in no acute distress. HEENT: No jaundice, oral mucosa moist, no oral ulcers  Neck: Supple, no stiffness, no lymphadenopathy, supple  Heart:  Normal S1/S2 negative S3 or S4. Regular, no murmur, gallop or rub, no jugular venous distention  Respiratory: Clear bilaterally x 4, no wheezing or rales  Abdomen:   Soft, non-tender, bowel sounds are active. Extremities:  No edema, normal cap refill, no cyanosis. Musculoskeletal: No clubbing, no deformities  Neuro: A&Ox3, speech clear, gait stable, cooperative, no focal neurologic deficits  Skin: Skin color is normal. No rashes or lesions.  Non diaphoretic, moist.  Vascular: 2+ pulses symmetric in all extremities       DIAGNOSTIC DATA      EKG:        LABORATORY DATA      Lab Results   Component Value Date/Time    WBC 7.8 01/07/2021 07:52 AM    HGB 15.0 01/07/2021 07:52 AM    HCT 46.8 01/07/2021 07:52 AM PLATELET 502 53/14/6865 07:52 AM    MCV 82.0 01/07/2021 07:52 AM      Lab Results   Component Value Date/Time    Sodium 139 01/07/2021 07:52 AM    Potassium 4.0 01/07/2021 07:52 AM    Chloride 108 01/07/2021 07:52 AM    CO2 27 01/07/2021 07:52 AM    Anion gap 4 (L) 01/07/2021 07:52 AM    Glucose 121 (H) 01/07/2021 07:52 AM    BUN 11 01/07/2021 07:52 AM    Creatinine 1.16 01/07/2021 07:52 AM    BUN/Creatinine ratio 9 (L) 01/07/2021 07:52 AM    GFR est AA >60 01/07/2021 07:52 AM    GFR est non-AA >60 01/07/2021 07:52 AM    Calcium 9.4 01/07/2021 07:52 AM    Bilirubin, total 1.1 (H) 01/07/2021 07:52 AM    Alk. phosphatase 94 01/07/2021 07:52 AM    Protein, total 7.5 01/07/2021 07:52 AM    Albumin 4.0 01/07/2021 07:52 AM    Globulin 3.5 01/07/2021 07:52 AM    A-G Ratio 1.1 01/07/2021 07:52 AM    ALT (SGPT) 49 01/07/2021 07:52 AM           ASSESSMENT      1. Atrial flutter              A. CTI ablation x 2   B. Right lateral atypical atrial flutter s/p ablation  2. Atrial fibrillation? 3. PVCs  4. Right atrial scar       PLAN     Repeat ablation attempt. Hold rate controlling meds 5 days prior. MAC anesthesia. If unable to induce tachycardia, plan to target right atrial scar region for substrate modification and also plan to evaluate for additional scar areas. ICD-10-CM ICD-9-CM    1. Atypical atrial flutter (HCC)  I48.4 427.32    2. Essential hypertension  I10 401.9      Orders Placed This Encounter    dilTIAZem IR (CARDIZEM) 30 mg tablet     Sig: Take  by mouth four (4) times daily. FOLLOW-UP     Post procedure      Thank you, Avis Ramirez MD for allowing me to participate in the care of this extraordinarily pleasant male. Please do not hesitate to contact me for further questions/concerns.          Betsy Garcia MD  Cardiac Electrophysiology / Cardiology    [address]  [address] [facility], [address] [place] [address] [address], [address] [place] [address],[address]  [place], [address] [place] [address] Drive    Chandler Grullon  [phone] / [phone] Fax   [phone] / [phone] Fax

## 2021-01-22 NOTE — PROGRESS NOTES
Medina Calzada is a 63 y.o. male    Visit Vitals  BP (!) 144/90 (BP 1 Location: Left arm, BP Patient Position: Sitting)   Pulse (!) 102   Ht 6' 1\" (1.854 m)   Wt 250 lb (113.4 kg)   SpO2 98%   BMI 32.98 kg/m²       Chief Complaint   Patient presents with    Other     S/P OF AF    Other     ABL       Chest pain NO  SOB NO  Dizziness NO  Swelling NO  Recent hospital visit NO  Refills NO

## 2021-01-23 ENCOUNTER — TRANSCRIBE ORDER (OUTPATIENT)
Dept: EMERGENCY DEPT | Age: 66
End: 2021-01-23

## 2021-01-23 ENCOUNTER — TRANSCRIBE ORDER (OUTPATIENT)
Dept: REGISTRATION | Age: 66
End: 2021-01-23

## 2021-01-23 DIAGNOSIS — Z01.812 PRE-PROCEDURAL LABORATORY EXAMINATIONS: Primary | ICD-10-CM

## 2021-01-23 DIAGNOSIS — Z20.822 COVID-19 RULED OUT: Primary | ICD-10-CM

## 2021-01-23 PROCEDURE — U0003 INFECTIOUS AGENT DETECTION BY NUCLEIC ACID (DNA OR RNA); SEVERE ACUTE RESPIRATORY SYNDROME CORONAVIRUS 2 (SARS-COV-2) (CORONAVIRUS DISEASE [COVID-19]), AMPLIFIED PROBE TECHNIQUE, MAKING USE OF HIGH THROUGHPUT TECHNOLOGIES AS DESCRIBED BY CMS-2020-01-R: HCPCS

## 2021-01-26 ENCOUNTER — ANESTHESIA (OUTPATIENT)
Dept: CARDIAC CATH/INVASIVE PROCEDURES | Age: 66
End: 2021-01-26
Payer: COMMERCIAL

## 2021-01-26 ENCOUNTER — HOSPITAL ENCOUNTER (OUTPATIENT)
Age: 66
Discharge: HOME | End: 2021-01-26
Attending: INTERNAL MEDICINE | Admitting: INTERNAL MEDICINE
Payer: COMMERCIAL

## 2021-01-26 ENCOUNTER — APPOINTMENT (OUTPATIENT)
Dept: CARDIAC CATH/INVASIVE PROCEDURES | Age: 66
End: 2021-01-26
Attending: INTERNAL MEDICINE
Payer: COMMERCIAL

## 2021-01-26 ENCOUNTER — ANESTHESIA EVENT (OUTPATIENT)
Dept: CARDIAC CATH/INVASIVE PROCEDURES | Age: 66
End: 2021-01-26
Payer: COMMERCIAL

## 2021-01-26 VITALS
DIASTOLIC BLOOD PRESSURE: 97 MMHG | TEMPERATURE: 97.6 F | HEIGHT: 74 IN | WEIGHT: 246 LBS | HEART RATE: 80 BPM | BODY MASS INDEX: 31.57 KG/M2 | SYSTOLIC BLOOD PRESSURE: 138 MMHG | RESPIRATION RATE: 20 BRPM | OXYGEN SATURATION: 93 %

## 2021-01-26 DIAGNOSIS — I48.0 AF (PAROXYSMAL ATRIAL FIBRILLATION) (HCC): ICD-10-CM

## 2021-01-26 LAB
ABO + RH BLD: NORMAL
BLOOD GROUP ANTIBODIES SERPL: NORMAL
SARS-COV-2, COV2NT: NOT DETECTED
SPECIMEN EXP DATE BLD: NORMAL

## 2021-01-26 PROCEDURE — 74011250636 HC RX REV CODE- 250/636: Performed by: INTERNAL MEDICINE

## 2021-01-26 PROCEDURE — 93613 INTRACARDIAC EPHYS 3D MAPG: CPT | Performed by: INTERNAL MEDICINE

## 2021-01-26 PROCEDURE — 93623 PRGRMD STIMJ&PACG IV RX NFS: CPT | Performed by: INTERNAL MEDICINE

## 2021-01-26 PROCEDURE — 77030041242 HC CBL CONN CARTO 3 J&J -D: Performed by: INTERNAL MEDICINE

## 2021-01-26 PROCEDURE — 93621 COMP EP EVL L PAC&REC C SINS: CPT | Performed by: INTERNAL MEDICINE

## 2021-01-26 PROCEDURE — C1732 CATH, EP, DIAG/ABL, 3D/VECT: HCPCS | Performed by: INTERNAL MEDICINE

## 2021-01-26 PROCEDURE — 77030020506 HC NDL TRNSPTL NRG BAYL -F: Performed by: INTERNAL MEDICINE

## 2021-01-26 PROCEDURE — 74011250636 HC RX REV CODE- 250/636: Performed by: NURSE ANESTHETIST, CERTIFIED REGISTERED

## 2021-01-26 PROCEDURE — 36415 COLL VENOUS BLD VENIPUNCTURE: CPT

## 2021-01-26 PROCEDURE — C1759 CATH, INTRA ECHOCARDIOGRAPHY: HCPCS | Performed by: INTERNAL MEDICINE

## 2021-01-26 PROCEDURE — C1893 INTRO/SHEATH, FIXED,NON-PEEL: HCPCS | Performed by: INTERNAL MEDICINE

## 2021-01-26 PROCEDURE — 77030039046 HC PAD DEFIB RADIOTRNSPNT CNMD -B: Performed by: INTERNAL MEDICINE

## 2021-01-26 PROCEDURE — 77030035291 HC TBNG PMP SMARTABLATE J&J -B: Performed by: INTERNAL MEDICINE

## 2021-01-26 PROCEDURE — 77030029359 HC PRB ESOPH TEMP CATH ANTM -F: Performed by: INTERNAL MEDICINE

## 2021-01-26 PROCEDURE — 93653 COMPRE EP EVAL TX SVT: CPT | Performed by: INTERNAL MEDICINE

## 2021-01-26 PROCEDURE — C1769 GUIDE WIRE: HCPCS | Performed by: INTERNAL MEDICINE

## 2021-01-26 PROCEDURE — 74011000250 HC RX REV CODE- 250: Performed by: NURSE ANESTHETIST, CERTIFIED REGISTERED

## 2021-01-26 PROCEDURE — 76060000034 HC ANESTHESIA 1.5 TO 2 HR: Performed by: INTERNAL MEDICINE

## 2021-01-26 PROCEDURE — 77030027107 HC PTCH EXT REF CARTO3 J&J -F: Performed by: INTERNAL MEDICINE

## 2021-01-26 PROCEDURE — C1760 CLOSURE DEV, VASC: HCPCS | Performed by: INTERNAL MEDICINE

## 2021-01-26 PROCEDURE — 99218 HC RM OBSERVATION: CPT

## 2021-01-26 PROCEDURE — 77030029065 HC DRSG HEMO QCLOT ZMED -B: Performed by: INTERNAL MEDICINE

## 2021-01-26 PROCEDURE — 77030013797 HC KT TRNSDUC PRSSR EDWD -A: Performed by: INTERNAL MEDICINE

## 2021-01-26 PROCEDURE — 86901 BLOOD TYPING SEROLOGIC RH(D): CPT

## 2021-01-26 PROCEDURE — C1894 INTRO/SHEATH, NON-LASER: HCPCS | Performed by: INTERNAL MEDICINE

## 2021-01-26 PROCEDURE — 74011000250 HC RX REV CODE- 250: Performed by: INTERNAL MEDICINE

## 2021-01-26 RX ORDER — FENTANYL CITRATE 50 UG/ML
INJECTION, SOLUTION INTRAMUSCULAR; INTRAVENOUS AS NEEDED
Status: DISCONTINUED | OUTPATIENT
Start: 2021-01-26 | End: 2021-01-26 | Stop reason: HOSPADM

## 2021-01-26 RX ORDER — LIDOCAINE HYDROCHLORIDE 10 MG/ML
INJECTION INFILTRATION; PERINEURAL AS NEEDED
Status: DISCONTINUED | OUTPATIENT
Start: 2021-01-26 | End: 2021-01-26 | Stop reason: HOSPADM

## 2021-01-26 RX ORDER — SODIUM CHLORIDE 9 MG/ML
INJECTION, SOLUTION INTRAVENOUS
Status: DISCONTINUED | OUTPATIENT
Start: 2021-01-26 | End: 2021-01-26 | Stop reason: HOSPADM

## 2021-01-26 RX ORDER — PROPOFOL 10 MG/ML
INJECTION, EMULSION INTRAVENOUS
Status: DISCONTINUED | OUTPATIENT
Start: 2021-01-26 | End: 2021-01-26 | Stop reason: HOSPADM

## 2021-01-26 RX ORDER — SODIUM CHLORIDE 0.9 % (FLUSH) 0.9 %
5-40 SYRINGE (ML) INJECTION AS NEEDED
Status: DISCONTINUED | OUTPATIENT
Start: 2021-01-26 | End: 2021-01-26 | Stop reason: HOSPADM

## 2021-01-26 RX ORDER — LIDOCAINE HYDROCHLORIDE 20 MG/ML
INJECTION, SOLUTION EPIDURAL; INFILTRATION; INTRACAUDAL; PERINEURAL AS NEEDED
Status: DISCONTINUED | OUTPATIENT
Start: 2021-01-26 | End: 2021-01-26 | Stop reason: HOSPADM

## 2021-01-26 RX ORDER — DEXMEDETOMIDINE HYDROCHLORIDE 100 UG/ML
INJECTION, SOLUTION INTRAVENOUS AS NEEDED
Status: DISCONTINUED | OUTPATIENT
Start: 2021-01-26 | End: 2021-01-26 | Stop reason: HOSPADM

## 2021-01-26 RX ORDER — PROPOFOL 10 MG/ML
INJECTION, EMULSION INTRAVENOUS AS NEEDED
Status: DISCONTINUED | OUTPATIENT
Start: 2021-01-26 | End: 2021-01-26 | Stop reason: HOSPADM

## 2021-01-26 RX ORDER — SODIUM CHLORIDE 0.9 % (FLUSH) 0.9 %
5-40 SYRINGE (ML) INJECTION EVERY 8 HOURS
Status: DISCONTINUED | OUTPATIENT
Start: 2021-01-26 | End: 2021-01-26 | Stop reason: HOSPADM

## 2021-01-26 RX ADMIN — PROPOFOL 30 MG: 10 INJECTION, EMULSION INTRAVENOUS at 14:05

## 2021-01-26 RX ADMIN — LIDOCAINE HYDROCHLORIDE 40 MG: 20 INJECTION, SOLUTION EPIDURAL; INFILTRATION; INTRACAUDAL; PERINEURAL at 14:03

## 2021-01-26 RX ADMIN — SODIUM CHLORIDE: 900 INJECTION, SOLUTION INTRAVENOUS at 14:02

## 2021-01-26 RX ADMIN — DEXMEDETOMIDINE HYDROCHLORIDE 10 MCG: 100 INJECTION, SOLUTION, CONCENTRATE INTRAVENOUS at 14:24

## 2021-01-26 RX ADMIN — PROPOFOL 30 MG: 10 INJECTION, EMULSION INTRAVENOUS at 14:07

## 2021-01-26 RX ADMIN — PROPOFOL 40 MG: 10 INJECTION, EMULSION INTRAVENOUS at 14:25

## 2021-01-26 RX ADMIN — PROPOFOL 30 MG: 10 INJECTION, EMULSION INTRAVENOUS at 14:47

## 2021-01-26 RX ADMIN — DEXMEDETOMIDINE HYDROCHLORIDE 10 MCG: 100 INJECTION, SOLUTION, CONCENTRATE INTRAVENOUS at 14:21

## 2021-01-26 RX ADMIN — FENTANYL CITRATE 25 MCG: 50 INJECTION, SOLUTION INTRAMUSCULAR; INTRAVENOUS at 14:51

## 2021-01-26 RX ADMIN — PROPOFOL 30 MG: 10 INJECTION, EMULSION INTRAVENOUS at 14:09

## 2021-01-26 RX ADMIN — FENTANYL CITRATE 25 MCG: 50 INJECTION, SOLUTION INTRAMUSCULAR; INTRAVENOUS at 14:50

## 2021-01-26 RX ADMIN — PROPOFOL 50 MCG/KG/MIN: 10 INJECTION, EMULSION INTRAVENOUS at 14:05

## 2021-01-26 NOTE — PROGRESS NOTES
TRANSFER - IN REPORT:    Verbal report received from [facility] and CRNA on Klaus Taylor  being received from procedure area for routine progression of care. Report consisted of patients Situation, Background, Assessment and Recommendations(SBAR). Information from the following report(s) SBAR, Procedure Summary, MAR, Recent Results and Cardiac Rhythm NSR was reviewed with the receiving clinician. Opportunity for questions and clarification was provided. Assessment completed upon patients arrival to [address] [place] [address] and care assumed. Cardiac Cath Lab Recovery Arrival Note:    Klaus Taylor arrived to [facility] recovery area. Patient procedure= Flutter ablation. Patient on cardiac monitor, non-invasive blood pressure, SPO2 monitor. On Room Air. IV  of NS on pump at 25 ml/hr. Patient status doing well without problems. Patient is A&Ox 5. Patient reports No Pain. PROCEDURE SITE CHECK:    Procedure site:without any bleeding and No Hematoma, No pain/discomfort reported at procedure site. No change in patient status. Continue to monitor patient and status.

## 2021-01-26 NOTE — PROCEDURES
EPS performed under conscious sedation  Patient in sinus rhythm. Unable to induce tachycardia on/off isuprel infusion  Pacing identified phrenic nerve course along anterolateral RA  Ablation performed connecting SVC to inferiolateral RA scar around areas where phrenic nerve identified  Block across ablation line confirmed using voltage mapping/Ripple  Patient noted to be in ectopic atrial rhythm at times but BP stable      Plan to monitor for recurrent tachycardia.    Recommend drug therapy for further recurrences of tachycardia in the future      Kathrin Dietrich MD

## 2021-01-26 NOTE — PROGRESS NOTES
Cardiac Cath Lab Recovery Arrival Note:      Bunny Dials arrived to Cardiac Cath Lab, Recovery Area. Staff introduced to patient. Patient identifiers verified with NAME and DATE OF BIRTH. Procedure verified with patient. Consent forms reviewed and signed by patient or authorized representative and verified. Allergies verified. Patient and family oriented to department. Patient and family informed of procedure and plan of care. Questions answered with review. Patient prepped for procedure, per orders from physician, prior to arrival.    Patient on cardiac monitor, non-invasive blood pressure, SPO2 monitor. On Room Air. Patient is A&Ox 4. Patient reports No Pain. Patient in stretcher, in low position, with side rails up, call bell within reach, patient instructed to call if assistance as needed. Patient prep in: [address], [facility]  Family in: Waiting Room.    Prep by: John Beat RN

## 2021-01-26 NOTE — ANESTHESIA PREPROCEDURE EVALUATION
Relevant Problems   CARDIOVASCULAR   (+) A-fib (HCC)   (+) Atrial flutter (HCC)   (+) High blood pressure      ENDOCRINE   (+) Obesity       Anesthetic History   No history of anesthetic complications            Review of Systems / Medical History  Patient summary reviewed, nursing notes reviewed and pertinent labs reviewed    Pulmonary  Within defined limits        Smoker         Neuro/Psych   Within defined limits           Cardiovascular  Within defined limits  Hypertension        Dysrhythmias       Exercise tolerance: >4 METS     GI/Hepatic/Renal  Within defined limits              Endo/Other  Within defined limits      Obesity     Other Findings              Physical Exam    Airway  Mallampati: II  TM Distance: > 6 cm  Neck ROM: normal range of motion   Mouth opening: Normal     Cardiovascular  Regular rate and rhythm,  S1 and S2 normal,  no murmur, click, rub, or gallop             Dental  No notable dental hx       Pulmonary  Breath sounds clear to auscultation               Abdominal  GI exam deferred       Other Findings            Anesthetic Plan    ASA: 3  Anesthesia type: general          Induction: Intravenous  Anesthetic plan and risks discussed with: Patient

## 2021-01-26 NOTE — H&P
Expand AllCollapse All                  HISTORY OF PRESENTING ILLNESS      Delfina Acosta is a 63 y.o. male who underwent AFL ablation in the past with subsequent recurrence and repeat CTI ablation last month whose monitor shows recurrent AFL (as well as PVCs). He is very symptomatic with HERRERA. Has been compliant with medications including anticoagulation since his procedure.   Gloria Richard then underwent repeat atypical/ right atrial flutter ablation (An area of slow conduction mid-RA was identified with delayed/fractionated potentials where ablation was performed and resulted in termination of the tachycardia nearly immediately following onset of RF energy. Bidirectional conduction block was demonstrated across the CTI).    Repeat monitor report with atypical atrial flutter and PVCs. He was  asymptomatic- feeling improvement since his procedure. Instructed him to restart his Diltiazem 120 mg daily and he presents for follow up.  Monitor has shown persistent recurrent tachycardia despite diltiazem restart.          PAST MEDICAL HISTORY           Past Medical History:   Diagnosis Date    Abnormal echocardiogram 10/28/2010    Atrial flutter (HCC)       s/p ablation     Dyslipidemia      Gout      High blood pressure      High blood pressure      Obesity 10/28/2010    Prediabetes      Prostate cancer ([facility] [address]      S/P ablation of atrial flutter      Supraventricular tachycardia ([facility] [address] 10/28/2010             PAST SURGICAL HISTORY            Past Surgical History:   Procedure Laterality Date    HX ACL RECONSTRUCTION   6/2013     menniscus    HX ROTATOR CUFF REPAIR Left 2005    HX ROTATOR CUFF REPAIR Right 2007    HX WISDOM TEETH EXTRACTION        INTRACARD ECHO, THER/DX INTERVENT N/A 1/7/2021     Intracardiac Echocardiogram performed by Andrade Boykin MD at [address] [place] [address], [facility] [facility]ATH LAB   [facility]     cardiac ablation    MN COMPRE ELECTROPHYSIOL XM W/LEFT ATRIAL PACNG/REC N/A 12/22/2020   Lt Atrial Pace & Record During Ep Study performed by Vineet Dowling MD at [facility] [address], [facility] CATH LAB    AL EPHYS EVAL W/ABLATION [facility] ARRHYTHMIA N/A 12/22/2020     ABLATION SVT performed by Vineet Dowling MD at [address] [place] [address], [facility] CATH LAB    AL EPHYS EVAL W/ABLATION SUPRAVENT ARRHYTHMIA N/A 1/7/2021     Ablation A-Flutter performed by Vineet Dowling MD at [address] [place] [address], [facility] CATH LAB    AL INTRACARDIAC ELECTROPHYSIOLOGIC 3D MAPPING N/A 12/22/2020     Ep 3d Mapping performed by Vineet Dowling MD at [address] [place] [address], [facility] CATH LAB    AL INTRACARDIAC ELECTROPHYSIOLOGIC 3D MAPPING N/A 1/7/2021     Ep 3d Mapping performed by Vineet Dowling MD at [address] [place] [address], [facility] CATH LAB             ALLERGIES            Allergies   Allergen Reactions    Percocet [Oxycodone-Acetaminophen] Itching       Pt has used hydrocodone            FAMILY HISTORY            Family History   Problem Relation Age of Onset    No Known Problems Mother      No Known Problems Father      Cancer Sister 43         breast    No Known Problems Sister      negative for cardiac disease         SOCIAL HISTORY      Social History               Socioeconomic History    Marital status: SINGLE       Spouse name: Not on file    Number of children: Not on file    Years of education: Not on file    Highest education level: Not on file   Tobacco Use    Smoking status: Never Smoker    Smokeless tobacco: Never Used   Substance and Sexual Activity    Alcohol use: Not Currently       Alcohol/week: 16.0 standard drinks       Types: 16 Cans of beer per week       Comment: reports not drinking during the week, only on weekends    Drug use: No               MEDICATIONS           Current Outpatient Medications   Medication Sig    dilTIAZem IR (CARDIZEM) 30 mg tablet Take  by mouth four (4) times daily.  apixaban (Eliquis) 5 mg tablet Take 1 Tab by mouth two (2) times a day.  allopurinoL (ZYLOPRIM) 100 mg tablet Take 100 mg by mouth daily.  Switched from [facility] to allopurinol on 7/17/2020    ibuprofen (MOTRIN) 600 mg tablet Take 600 mg by mouth every six (6) hours as needed for Pain.      No current facility-administered medications for this visit.          I have reviewed the nurses notes, vitals, problem list, allergy list, medical history, family, social history and medications.         REVIEW OF SYMPTOMS      General: Pt denies excessive weight gain or loss. Pt is able to conduct ADL's  HEENT: Denies blurred vision, headaches, hearing loss, epistaxis and difficulty swallowing. Respiratory: Denies cough, congestion, shortness of breath, HERRERA, wheezing or stridor. Cardiovascular: Denies precordial pain, palpitations, edema or PND  Gastrointestinal: Denies poor appetite, indigestion, abdominal pain or blood in stool  Genitourinary: Denies hematuria, dysuria, increased urinary frequency  Musculoskeletal: Denies joint pain or swelling from muscles or joints  Neurologic: Denies tremor, paresthesias, headache, or sensory motor disturbance  Psychiatric: Denies confusion, insomnia, depression  Integumentray: Denies rash, itching or ulcers. Hematologic: Denies easy bruising, bleeding         PHYSICAL EXAMINATION      Vitals: see vitals section  General: Well developed, in no acute distress. HEENT: No jaundice, oral mucosa moist, no oral ulcers  Neck: Supple, no stiffness, no lymphadenopathy, supple  Heart:  Normal S1/S2 negative S3 or S4. Regular, no murmur, gallop or rub, no jugular venous distention  Respiratory: Clear bilaterally x 4, no wheezing or rales  Abdomen:   Soft, non-tender, bowel sounds are active.   Extremities:  No edema, normal cap refill, no cyanosis. Musculoskeletal: No clubbing, no deformities  Neuro: A&Ox3, speech clear, gait stable, cooperative, no focal neurologic deficits  Skin: Skin color is normal. No rashes or lesions.  Non diaphoretic, moist.  Vascular: 2+ pulses symmetric in all extremities         DIAGNOSTIC DATA      EKG:          LABORATORY DATA            Lab Results   Component Value Date/Time     WBC 7.8 01/07/2021 07:52 AM     HGB 15.0 01/07/2021 07:52 AM     HCT 46.8 01/07/2021 07:52 AM     PLATELET 344 09/66/5555 07:52 AM     MCV 82.0 01/07/2021 07:52 AM            Lab Results   Component Value Date/Time     Sodium 139 01/07/2021 07:52 AM     Potassium 4.0 01/07/2021 07:52 AM     Chloride 108 01/07/2021 07:52 AM     CO2 27 01/07/2021 07:52 AM     Anion gap 4 (L) 01/07/2021 07:52 AM     Glucose 121 (H) 01/07/2021 07:52 AM     BUN 11 01/07/2021 07:52 AM     Creatinine 1.16 01/07/2021 07:52 AM     BUN/Creatinine ratio 9 (L) 01/07/2021 07:52 AM     GFR est AA >60 01/07/2021 07:52 AM     GFR est non-AA >60 01/07/2021 07:52 AM     Calcium 9.4 01/07/2021 07:52 AM     Bilirubin, total 1.1 (H) 01/07/2021 07:52 AM     Alk. phosphatase 94 01/07/2021 07:52 AM     Protein, total 7.5 01/07/2021 07:52 AM     Albumin 4.0 01/07/2021 07:52 AM     Globulin 3.5 01/07/2021 07:52 AM     A-G Ratio 1.1 01/07/2021 07:52 AM     ALT (SGPT) 49 01/07/2021 07:52 AM             ASSESSMENT      1. Atrial flutter              A. CTI ablation x 2             B. Right lateral atypical atrial flutter s/p ablation  2. Atrial fibrillation? 3. PVCs  4. Right atrial scar         PLAN      Repeat ablation attempt. Hold rate controlling meds 5 days prior. MAC anesthesia.  If unable to induce tachycardia, plan to target right atrial scar region for substrate modification and also plan to evaluate for additional scar areas.              Niurka Gold MD  Cardiac Electrophysiology / Cardiology     [address]  [address] [place] [address], [address]  [address]  [phone] / [phone] Fax                            [phone] / [phone] Fax

## 2021-01-26 NOTE — ANESTHESIA POSTPROCEDURE EVALUATION
Post-Anesthesia Evaluation and Assessment    Patient: Rodrick Hobson MRN: [id number]  SSN: [id number]    YOB: 1955  Age: 63 y.o. Sex: male      I have evaluated the patient and they are stable and ready for discharge from the PACU. Cardiovascular Function/Vital Signs  Visit Vitals  BP (!) 148/104   Pulse 81   Temp 36.4 °C (97.6 °F)   Resp 18   Ht 6' 2\" (1.88 m)   Wt 111.6 kg (246 lb)   SpO2 90%   BMI 31.58 kg/m²       Patient is status post * No anesthesia type entered * anesthesia for Procedure(s):  Ablation A-Flutter  Lt Atrial Pace & Record During Ep Study  Ep 3d Mapping. Nausea/Vomiting: None    Postoperative hydration reviewed and adequate. Pain:  Pain Scale 1: Numeric (0 - 10) (01/26/21 1553)  Pain Intensity 1: 0 (01/26/21 1553)   Managed    Neurological Status: At baseline    Mental Status, Level of Consciousness: Alert and  oriented to person, place, and time    Pulmonary Status:   O2 Device: Room air (01/26/21 1553)   Adequate oxygenation and airway patent    Complications related to anesthesia: None    Post-anesthesia assessment completed. No concerns    Signed By: Berenice Phipps MD     January 26, 2021              Procedure(s):  Ablation A-Flutter  Lt Atrial Pace & Record During Ep Study  Ep 3d Mapping. general    <BSHSIANPOST>    INITIAL Post-op Vital signs:   Vitals Value Taken Time   /104 01/26/21 1616   Temp 36.4 °C (97.6 °F) 01/26/21 1553   Pulse 55 01/26/21 1618   Resp 18 01/26/21 1553   SpO2 94 % 01/26/21 1618   Vitals shown include unvalidated device data.

## 2021-01-26 NOTE — DISCHARGE INSTRUCTIONS
Electrophysiology Study (EPS)/Cardiac Ablation   Discharge Instructions      You have just had a Cardiac Ablation for: atrial flutter. There were catheters temporarily placed in your heart through a puncture in the Veins and/or Arteries in your groin. WHAT TO EXPECT      If you have had a Cardiac Ablation please be aware that you may experience mild chest pain that will resolve within 24-48 hours. If the Chest Pain begins radiating down your shoulders or arms, becomes severe or breathing becomes difficult, call 911 or go to the closest emergency room.  Mild to moderate, non-painful, bruising or mild swelling at the puncture site is not un-common, and will resolve in 7 - 14 days, and may extend down your thigh as it heals.  If a closure device was used to seal your artery or vein, a separate pamphlet will be given to you with these discharge instructions. It is very important that you review the information in the pamphlet for different restrictions and precautions.  You have a small gauze dressing applied to the puncture site in your groin. You may remove this the following morning.  You MAY receive a 30 day heart monitor in the mail to wear after your procedure. This is to evaluate your heart rhythm post ablation. MEDICATIONS      Take only the medications prescribed to you at discharge. ACTIVITY      A responsible adult must take you home. Do not drive a car for 24 hours.  Rest quietly for the remainder of the day.  Do not lift anything greater than 10 pounds for 3 days.  Limit bending at the puncture site and use of stairs for at least 3 days.  You may remove the bandage and shower the morning after the procedure. Do not take a bath for 3 days. SYMPTOMS THAT NEED TO BE REPORTED IMMEDIATELY      Bleeding at the puncture site. If there is bleeding, lie down and hold firm direct pressure for at least 5 minutes.   If the bleeding does not stop, go to the closest emergency room, or call 911.  Temperature more than 100.5 F.   Redness or warmth at the puncture site.  Increasing pain, numbness, coolness or blue discoloration of the extremity where the puncture is located.  Pulsating mass at the puncture site.  A new lump at the puncture site, or increasing swelling at the site. Please be aware that a pea or marble sized lump is normal, anything larger or increasing in size should be reported.  Bruising at the puncture site that enlarges or becomes painful (some bruising at the site is common and will go away in 7-14 days).  Rapid heart rate or palpitations.  Dizziness, lightheadedness, fainting.  REMEMBER: If you feel something is an emergency or cannot be handled over the phone, call 911 or go to the closest emergency room.       FOLLOW UP CARE     Barbara Serrato NP in 2 weeks  Dr. Emily Denver in 3 months        Pham Padilla MD  Cardiac Electrophysiology / Cardiology    [address]  [address] [place] [address]  [phone] / [phone] Fax   [phone] / [phone] Fax

## 2021-01-26 NOTE — PROGRESS NOTES
Pt dressed self without ; no comp[laints of discomfort. Pt verbalized understanding of discharge instructions. PIV removed and guaze with tape placed; no redness or swelling at site. Pt escorted to car via wheelchair with belongings. Girlfriend is driving.

## 2021-01-28 ENCOUNTER — TELEPHONE (OUTPATIENT)
Dept: CARDIOLOGY CLINIC | Age: 66
End: 2021-01-28

## 2021-01-28 RX ORDER — DILTIAZEM HYDROCHLORIDE 120 MG/1
120 CAPSULE, EXTENDED RELEASE ORAL DAILY
Qty: 30 CAP | Refills: 1 | Status: SHIPPED | OUTPATIENT
Start: 2021-01-28 | End: 2021-07-19 | Stop reason: SDUPTHER

## 2021-01-28 NOTE — TELEPHONE ENCOUNTER
Patient calling in regards to recent ablation. States he has received papers but claims they are wrong.      Phone: [phone]

## 2021-01-28 NOTE — TELEPHONE ENCOUNTER
Returned patient call, ID verified using two patient identifiers. Patient states he received the wrong information at discharge post ablation. He states he didn't see anyone after he was done with the procedure to explain things to him. He said at his last office visit he was told to stop all of his medications. Reviewed last office visit After Visit Summary from 1/22/21 and he was told to stop his Digoxin and Diltiazem 5 days prior to the procedure but to continue you his eliquis. His Diltiazem dose was changed at that last visit to 30mg four times a day. Patient would prefer to take it once a day. Also states he needs a refill on the Eliquis if he is to continue taking it, but would prefer to just take aspirin. Would like clarification of his medications with the doctor. Will relay to Dr. Shelley Mayen, NP for review. Informed patient I will be calling back with clarification. Patient verbalized understanding and will call with any other questions.

## 2021-01-28 NOTE — TELEPHONE ENCOUNTER
Returned call to patient, ID verified using two patient identifiers. Informed him that Tania Acosta NP has sent a refill for his Eliquis to his pharmacy. She also switched his Diltiazem back to 120mg daily and sent a new script. Asked that patient continue with these medications until his follow up appointment where he can discuss with Dr. Musa Chamberlain his preference to not take any meds. The  will be in touch with him to schedule that follow up appointment with Dr. Musa Chamberlain. Patient verbalized understanding and will call with any other questions.     Future Appointments   Date Time Provider Marizol Plascencia   2/12/2021  9:40 AM AKILAH GutierrezF BS AMB   3/15/2021  8:40 AM Sharon De La Rosa MD [facility] BS AMB

## 2021-02-12 ENCOUNTER — OFFICE VISIT (OUTPATIENT)
Dept: CARDIOLOGY CLINIC | Age: 66
End: 2021-02-12
Payer: COMMERCIAL

## 2021-02-12 VITALS
HEART RATE: 70 BPM | BODY MASS INDEX: 33.06 KG/M2 | SYSTOLIC BLOOD PRESSURE: 142 MMHG | RESPIRATION RATE: 16 BRPM | WEIGHT: 257.6 LBS | DIASTOLIC BLOOD PRESSURE: 88 MMHG | OXYGEN SATURATION: 98 % | HEIGHT: 74 IN

## 2021-02-12 DIAGNOSIS — I10 ESSENTIAL HYPERTENSION: ICD-10-CM

## 2021-02-12 DIAGNOSIS — I48.4 ATYPICAL ATRIAL FLUTTER (HCC): Primary | ICD-10-CM

## 2021-02-12 PROCEDURE — 99215 OFFICE O/P EST HI 40 MIN: CPT | Performed by: INTERNAL MEDICINE

## 2021-02-12 PROCEDURE — 93000 ELECTROCARDIOGRAM COMPLETE: CPT | Performed by: INTERNAL MEDICINE

## 2021-02-12 NOTE — PROGRESS NOTES
Room # 3    Chest pain: no  Shortness of breath: no  Edema: no  Palpitations, Skipped beats, Rapid heartbeat: no  Dizziness: no  Fatigue:no    New diagnosis/Surgeries: no    ER/Hospitalizations: no    Refills:no    Visit Vitals  BP (!) 142/88 (BP 1 Location: Left upper arm, BP Patient Position: Sitting)   Pulse 70   Resp 16   Ht 6' 2\" (1.88 m)   Wt 257 lb 9.6 oz (116.8 kg)   SpO2 98%   BMI 33.07 kg/m²

## 2021-02-12 NOTE — PROGRESS NOTES
HISTORY OF PRESENTING ILLNESS      Caro Kaur is a 63 y.o. male who underwent AFL ablation in the past with subsequent recurrence and repeat CTI ablation last month whose monitor shows recurrent AFL (as well as PVCs). He is very symptomatic with HERRERA. Has been compliant with medications including anticoagulation since his procedure.   Flores Holter then underwent repeat atypical/ right atrial flutter ablation (An area of slow conduction mid-RA was identified with delayed/fractionated potentials where ablation was performed and resulted in termination of the tachycardia nearly immediately following onset of RF energy. Bidirectional conduction block was demonstrated across the CTI).    Repeat monitor report with atypical atrial flutter and PVCs. He was  asymptomatic- feeling improvement since his procedure. Instructed him to restart his Diltiazem 120 mg daily and underwent repeat atypical flutter ablation. Monitor has shown persistent recurrent tachycardia despite diltiazem restart. EKG shows ectopic atrial rhythm. Denies palpitations.         PAST MEDICAL HISTORY     Past Medical History:   Diagnosis Date    Abnormal echocardiogram 10/28/2010    Atrial flutter (HCC)     s/p ablation     Dyslipidemia     Gout     High blood pressure     High blood pressure     Obesity 10/28/2010    Prediabetes     [facility] ([facility] [address]     S/P ablation of atrial flutter     Supraventricular tachycardia ([facility] [address] 10/28/2010           PAST SURGICAL HISTORY     Past Surgical History:   Procedure Laterality Date    HX ACL RECONSTRUCTION  6/2013    menniscus    HX ROTATOR CUFF REPAIR Left 2005    HX ROTATOR CUFF REPAIR Right 2007    HX WISDOM TEETH EXTRACTION      INTRACARD ECHO, THER/DX INTERVENT N/A 1/7/2021    Intracardiac Echocardiogram performed by Cristiano Nails MD at [place] [address], [address] CATH LAB    HI CARDIAC SURG PROCEDURE UNLIST  2016    cardiac ablation    HI COMPRE ELECTROPHYSIOL XM W/LEFT ATRIAL PACNG/REC N/A 12/22/2020    Lt Atrial Pace & Record During Ep Study performed by Alex Staley MD at [place] [address], [facility] Dr CATH LAB    VA COMPRE ELECTROPHYSIOL XM W/LEFT ATRIAL PACNG/REC N/A 1/26/2021    Lt Atrial Pace & Record During Ep Study performed by Alex Staley MD at [place] [address], [facility]/[address] Dr CATH LAB    VA EPHYS EVAL W/ABLATION [facility] ARRHYTHMIA N/A 12/22/2020    ABLATION SVT performed by Alex Staley MD at [place] [address], [facility]/[address] Dr CATH LAB    VA EPHYS EVAL W/ABLATION SUPRAVENT ARRHYTHMIA N/A 1/7/2021    Ablation A-Flutter performed by Alex Staley MD at [place] [address], [facility] CATH LAB    VA EPHYS EVAL W/ABLATION SUPRAVENT ARRHYTHMIA N/A 1/26/2021    Ablation A-Flutter performed by Alex Staley MD at [place] [address], [facility] CATH LAB    VA INTRACARDIAC ELECTROPHYSIOLOGIC 3D MAPPING N/A 12/22/2020    Ep 3d Mapping performed by Alex Staley MD at [place] [address], [facility]/[address] Dr CATH LAB    VA INTRACARDIAC ELECTROPHYSIOLOGIC 3D MAPPING N/A 1/7/2021    Ep 3d Mapping performed by Alex Staley MD at [place] [address], [facility] Dr CATH LAB    VA INTRACARDIAC ELECTROPHYSIOLOGIC 3D MAPPING N/A 1/26/2021    Ep 3d Mapping performed by Alex Staley MD at [place] [address], [facility] Dr CATH LAB          ALLERGIES     Allergies   Allergen Reactions    Percocet [Oxycodone-Acetaminophen] Itching     Pt has used hydrocodone          FAMILY HISTORY     Family History   Problem Relation Age of Onset    No Known Problems Mother     No Known Problems Father     Cancer Sister 43        breast    No Known Problems Sister     negative for cardiac disease       SOCIAL HISTORY     Social History     Socioeconomic History    Marital status: SINGLE     Spouse name: Not on file    Number of children: Not on file    Years of education: Not on file    Highest education level: Not on file   Tobacco Use    Smoking status: Never Smoker    Smokeless tobacco: Never Used   Substance and Sexual Activity    Alcohol use: Not Currently     Alcohol/week: 16.0 standard drinks     Types: 16 Cans of beer per week     Comment: reports not drinking during the week, only on weekends    Drug use: No         MEDICATIONS     Current Outpatient Medications   Medication Sig    apixaban (Eliquis) 5 mg tablet Take 1 Tab by mouth two (2) times a day.  dilTIAZem ER (DILACOR XR) 120 mg capsule Take 1 Cap by mouth daily.  ibuprofen (MOTRIN) 600 mg tablet Take 600 mg by mouth every six (6) hours as needed for Pain.  allopurinoL (ZYLOPRIM) 100 mg tablet Take 100 mg by mouth daily. Switched from [place] to allopurinol on 7/17/2020     No current facility-administered medications for this visit. I have reviewed the nurses notes, vitals, problem list, allergy list, medical history, family, social history and medications. REVIEW OF SYMPTOMS      General: Pt denies excessive weight gain or loss. Pt is able to conduct ADL's  HEENT: Denies blurred vision, headaches, hearing loss, epistaxis and difficulty swallowing. Respiratory: Denies cough, congestion, shortness of breath, HERRERA, wheezing or stridor. Cardiovascular: Denies precordial pain, palpitations, edema or PND  Gastrointestinal: Denies poor appetite, indigestion, abdominal pain or blood in stool  Genitourinary: Denies hematuria, dysuria, increased urinary frequency  Musculoskeletal: Denies joint pain or swelling from muscles or joints  Neurologic: Denies tremor, paresthesias, headache, or sensory motor disturbance  Psychiatric: Denies confusion, insomnia, depression  Integumentray: Denies rash, itching or ulcers. Hematologic: Denies easy bruising, bleeding       PHYSICAL EXAMINATION      Vitals: see vitals section  General: Well developed, in no acute distress. HEENT: No jaundice, oral mucosa moist, no oral ulcers  Neck: Supple, no stiffness, no lymphadenopathy, supple  Heart:  Normal S1/S2 negative S3 or S4. Regular, no murmur, gallop or rub, no jugular venous distention  Respiratory: Clear bilaterally x 4, no wheezing or rales  Abdomen:   Soft, non-tender, bowel sounds are active. Extremities:  No edema, normal cap refill, no cyanosis. Musculoskeletal: No clubbing, no deformities  Neuro: A&Ox3, speech clear, gait stable, cooperative, no focal neurologic deficits  Skin: Skin color is normal. No rashes or lesions. Non diaphoretic, moist.  Vascular: 2+ pulses symmetric in all extremities       DIAGNOSTIC DATA      EKG: Ectopic atrial rhythm       LABORATORY DATA      Lab Results   Component Value Date/Time    WBC 7.8 01/07/2021 07:52 AM    HGB 15.0 01/07/2021 07:52 AM    HCT 46.8 01/07/2021 07:52 AM    PLATELET 937 24/13/7625 07:52 AM    MCV 82.0 01/07/2021 07:52 AM      Lab Results   Component Value Date/Time    Sodium 139 01/07/2021 07:52 AM    Potassium 4.0 01/07/2021 07:52 AM    Chloride 108 01/07/2021 07:52 AM    CO2 27 01/07/2021 07:52 AM    Anion gap 4 (L) 01/07/2021 07:52 AM    Glucose 121 (H) 01/07/2021 07:52 AM    BUN 11 01/07/2021 07:52 AM    Creatinine 1.16 01/07/2021 07:52 AM    BUN/Creatinine ratio 9 (L) 01/07/2021 07:52 AM    GFR est AA >60 01/07/2021 07:52 AM    GFR est non-AA >60 01/07/2021 07:52 AM    Calcium 9.4 01/07/2021 07:52 AM    Bilirubin, total 1.1 (H) 01/07/2021 07:52 AM    Alk. phosphatase 94 01/07/2021 07:52 AM    Protein, total 7.5 01/07/2021 07:52 AM    Albumin 4.0 01/07/2021 07:52 AM    Globulin 3.5 01/07/2021 07:52 AM    A-G Ratio 1.1 01/07/2021 07:52 AM    ALT (SGPT) 49 01/07/2021 07:52 AM           ASSESSMENT      1. Atrial flutter              A. CTI ablation x 2             B. Right lateral atypical atrial flutter s/p ablation x2  2. Atrial fibrillation? 3. PVCs  4. Right atrial scar       PLAN     Continue eliquis for another 2 weeks; wants to DC diltiazem. May restart if PVCs recur. ICD-10-CM ICD-9-CM    1. Atypical atrial flutter (HCC)  I48.4 427.32    2. Essential hypertension  I10 401.9      No orders of the defined types were placed in this encounter.          FOLLOW-UP     6 months        Thank you, Tata Moore MD for allowing me to participate in the care of this extraordinarily pleasant male. Please do not hesitate to contact me for further questions/concerns.          Daly Rasmussen MD  Cardiac Electrophysiology / Cardiology    [facility] [address].  [address] [place] [address]  [facility]    [facility]  [phone] / [phone] Fax   [phone] / [phone] Fax

## 2021-05-26 ENCOUNTER — OFFICE VISIT (OUTPATIENT)
Dept: CARDIOLOGY CLINIC | Age: 66
End: 2021-05-26
Payer: COMMERCIAL

## 2021-05-26 VITALS
WEIGHT: 255 LBS | BODY MASS INDEX: 32.73 KG/M2 | OXYGEN SATURATION: 97 % | DIASTOLIC BLOOD PRESSURE: 72 MMHG | RESPIRATION RATE: 16 BRPM | HEIGHT: 74 IN | HEART RATE: 122 BPM | SYSTOLIC BLOOD PRESSURE: 124 MMHG

## 2021-05-26 DIAGNOSIS — I10 ESSENTIAL HYPERTENSION: ICD-10-CM

## 2021-05-26 DIAGNOSIS — Z01.812 PRE-PROCEDURE LAB EXAM: ICD-10-CM

## 2021-05-26 DIAGNOSIS — I48.91 ATRIAL FIBRILLATION, UNSPECIFIED TYPE (HCC): Primary | ICD-10-CM

## 2021-05-26 PROCEDURE — 93000 ELECTROCARDIOGRAM COMPLETE: CPT | Performed by: INTERNAL MEDICINE

## 2021-05-26 PROCEDURE — 99215 OFFICE O/P EST HI 40 MIN: CPT | Performed by: INTERNAL MEDICINE

## 2021-05-26 RX ORDER — HYDROCODONE BITARTRATE AND ACETAMINOPHEN 5; 325 MG/1; MG/1
TABLET ORAL
Status: ON HOLD | COMMUNITY
End: 2021-07-06

## 2021-05-26 NOTE — PROGRESS NOTES
Room EP 2  Visit Vitals  /72 (BP 1 Location: Right upper arm, BP Patient Position: Supine)   Pulse (!) 122   Resp 16   Ht 6' 2\" (1.88 m)   Wt 255 lb (115.7 kg)   SpO2 97%   BMI 32.74 kg/m²       C/o ED, has seen Dr. Madina Canales at [facility]  Chest pain: yes intermittent  Shortness of breath: yes with exertion  Edema: no  Palpitations, Skipped beats, Rapid heartbeat: occasional fast heart beat  Dizziness: no  Fatigue:tires more easily    New diagnosis/Surgeries: no    ER/Hospitalizations: no    Refills:no

## 2021-05-26 NOTE — PATIENT INSTRUCTIONS
You are scheduled for the following procedure with Dr. Raj Negron:  FREDRICK & Cardioversion at [facility], [address] [facility], [place], [id number] [place] [address] N[place] PLEASE be aware that your procedure date/time is tentative and subject to change due to emergency cases. Procedure date/time:    June 2, 2021  Time: 12:00 pm - please arrive by 11:00 am 
 
 
ARRIVAL time:  (You will need  to be discharged home with.)  
 
o [facility] procedures: please arrive to check in on 2nd floor one hour prior to your procedure the day of your procedure. 
 
o [facility] procedures: arrive to check in on 1st floor near [facility] entrance two hours prior to your procedure. Pre-procedure Labs/Imaging: 
 
o PRE-PROCEDURE LABS NEEDED: NO  
o Forms for labwork may be given at appointment. If not, they will be mailed to you. Labs should be completed within 5-7 days of procedure. These can be done at any [facility] or [facility] lab (one is located in [address] [place] [address]. No appointment needed). YES A COVID swab may be needed 4 days prior to your procedure. o YOU MAY NEED PRE-PROCEDURE IMAGING, CHEST CTA OR CARDIAC MRI.  
   
[]  Chest CTA []  Cardiac MRI    ([facility] scheduling to call you)  -  [phone] Medication Instructions: Do not stop the following blood thinner prior to procedure: Eliquis If you take any of the following Diabetic medications, please use as follows: 
 
[]  Glucophage/Metformin  -  Hold morning dose on day of procedure. 
 
[]  Insulin  -  Hold morning dose on day of procedure. 
 
[]  Lantus  -  Reduce dose by ½ evening before procedure. Nothing to eat or drink after midnight before your procedure. You may take all other medications as directed the morning of your procedure with a sip of water unless otherwise indicated. Please contact the office [phone] and ask for a member of Dr. Raj Negron' team for procedure questions.  There is a physician on call for the office after hours for immediate needs. FOLLOW UP: 
 Your appointments will be made for you post procedure based off of discharge instructions. You may have driving restrictions for a short time after your procedure (usually 2-3 days). Pacemaker/ICD patients will be unable to have an MRI until 6 weeks after implant, NO dental work for 8 weeks after your implant.

## 2021-05-30 ENCOUNTER — TRANSCRIBE ORDER (OUTPATIENT)
Dept: EMERGENCY DEPT | Age: 66
End: 2021-05-30

## 2021-05-30 ENCOUNTER — HOSPITAL ENCOUNTER (OUTPATIENT)
Dept: LAB | Age: 66
Discharge: HOME | End: 2021-05-30
Payer: COMMERCIAL

## 2021-05-30 DIAGNOSIS — Z01.812 PRE-PROCEDURAL LABORATORY EXAMINATIONS: Primary | ICD-10-CM

## 2021-05-30 DIAGNOSIS — Z01.812 PRE-PROCEDURAL LABORATORY EXAMINATIONS: ICD-10-CM

## 2021-05-30 PROCEDURE — U0003 INFECTIOUS AGENT DETECTION BY NUCLEIC ACID (DNA OR RNA); SEVERE ACUTE RESPIRATORY SYNDROME CORONAVIRUS 2 (SARS-COV-2) (CORONAVIRUS DISEASE [COVID-19]), AMPLIFIED PROBE TECHNIQUE, MAKING USE OF HIGH THROUGHPUT TECHNOLOGIES AS DESCRIBED BY CMS-2020-01-R: HCPCS

## 2021-05-31 LAB — SARS-COV-2, COV2NT: NOT DETECTED

## 2021-06-01 ENCOUNTER — PREP FOR PROCEDURE (OUTPATIENT)
Dept: CARDIOLOGY CLINIC | Age: 66
End: 2021-06-01

## 2021-06-01 RX ORDER — SODIUM CHLORIDE 0.9 % (FLUSH) 0.9 %
5-40 SYRINGE (ML) INJECTION EVERY 8 HOURS
Status: CANCELLED | OUTPATIENT
Start: 2021-06-01

## 2021-06-01 RX ORDER — SODIUM CHLORIDE 0.9 % (FLUSH) 0.9 %
5-40 SYRINGE (ML) INJECTION AS NEEDED
Status: CANCELLED | OUTPATIENT
Start: 2021-06-01

## 2021-06-02 ENCOUNTER — APPOINTMENT (OUTPATIENT)
Dept: CARDIAC CATH/INVASIVE PROCEDURES | Age: 66
End: 2021-06-02
Attending: INTERNAL MEDICINE
Payer: COMMERCIAL

## 2021-06-02 ENCOUNTER — HOSPITAL ENCOUNTER (OUTPATIENT)
Age: 66
Setting detail: OUTPATIENT SURGERY
Discharge: HOME | End: 2021-06-02
Attending: INTERNAL MEDICINE | Admitting: INTERNAL MEDICINE
Payer: COMMERCIAL

## 2021-06-02 VITALS
TEMPERATURE: 98.1 F | BODY MASS INDEX: 31.18 KG/M2 | WEIGHT: 243 LBS | HEART RATE: 92 BPM | HEIGHT: 74 IN | RESPIRATION RATE: 8 BRPM | OXYGEN SATURATION: 94 % | DIASTOLIC BLOOD PRESSURE: 65 MMHG | SYSTOLIC BLOOD PRESSURE: 95 MMHG

## 2021-06-02 DIAGNOSIS — I48.91 ATRIAL FIBRILLATION, UNSPECIFIED TYPE (HCC): ICD-10-CM

## 2021-06-02 LAB
ANION GAP SERPL CALC-SCNC: 5 MMOL/L (ref 5–15)
BUN SERPL-MCNC: 12 MG/DL (ref 6–20)
BUN/CREAT SERPL: 13 (ref 12–20)
CALCIUM SERPL-MCNC: 9.2 MG/DL (ref 8.5–10.1)
CHLORIDE SERPL-SCNC: 105 MMOL/L (ref 97–108)
CO2 SERPL-SCNC: 27 MMOL/L (ref 21–32)
CREAT SERPL-MCNC: 0.92 MG/DL (ref 0.7–1.3)
GLUCOSE SERPL-MCNC: 121 MG/DL (ref 65–100)
MAGNESIUM SERPL-MCNC: 2.2 MG/DL (ref 1.6–2.4)
POTASSIUM SERPL-SCNC: 4.2 MMOL/L (ref 3.5–5.1)
SODIUM SERPL-SCNC: 137 MMOL/L (ref 136–145)

## 2021-06-02 PROCEDURE — 74011250636 HC RX REV CODE- 250/636: Performed by: INTERNAL MEDICINE

## 2021-06-02 PROCEDURE — 83735 ASSAY OF MAGNESIUM: CPT

## 2021-06-02 PROCEDURE — 92960 CARDIOVERSION ELECTRIC EXT: CPT | Performed by: INTERNAL MEDICINE

## 2021-06-02 PROCEDURE — 99152 MOD SED SAME PHYS/QHP 5/>YRS: CPT | Performed by: INTERNAL MEDICINE

## 2021-06-02 PROCEDURE — 80048 BASIC METABOLIC PNL TOTAL CA: CPT

## 2021-06-02 PROCEDURE — 92960 CARDIOVERSION ELECTRIC EXT: CPT

## 2021-06-02 PROCEDURE — 77030018729 HC ELECTRD DEFIB PAD CARD -B: Performed by: INTERNAL MEDICINE

## 2021-06-02 PROCEDURE — 93325 DOPPLER ECHO COLOR FLOW MAPG: CPT

## 2021-06-02 PROCEDURE — 36415 COLL VENOUS BLD VENIPUNCTURE: CPT

## 2021-06-02 RX ORDER — SODIUM CHLORIDE 0.9 % (FLUSH) 0.9 %
5-40 SYRINGE (ML) INJECTION EVERY 8 HOURS
Status: DISCONTINUED | OUTPATIENT
Start: 2021-06-02 | End: 2021-06-02 | Stop reason: HOSPADM

## 2021-06-02 RX ORDER — MIDAZOLAM HYDROCHLORIDE 1 MG/ML
INJECTION, SOLUTION INTRAMUSCULAR; INTRAVENOUS AS NEEDED
Status: DISCONTINUED | OUTPATIENT
Start: 2021-06-02 | End: 2021-06-02 | Stop reason: HOSPADM

## 2021-06-02 RX ORDER — FENTANYL CITRATE 50 UG/ML
INJECTION, SOLUTION INTRAMUSCULAR; INTRAVENOUS AS NEEDED
Status: DISCONTINUED | OUTPATIENT
Start: 2021-06-02 | End: 2021-06-02 | Stop reason: HOSPADM

## 2021-06-02 RX ORDER — SODIUM CHLORIDE 0.9 % (FLUSH) 0.9 %
5-40 SYRINGE (ML) INJECTION AS NEEDED
Status: DISCONTINUED | OUTPATIENT
Start: 2021-06-02 | End: 2021-06-02 | Stop reason: HOSPADM

## 2021-06-02 NOTE — PROGRESS NOTES
11:15 AM  Patient arrived. ID and allergies verified verbally with patient. Pt voices understanding of procedure to be performed. Consent obtained. Pt prepped for procedure. 11:55 AM  TRANSFER - OUT REPORT:    Verbal report given to Damion Bailey RN on Gemma Hdz  being transferred to EP lab for ordered procedure       Report consisted of patients Situation, Background, Assessment and   Recommendations(SBAR). Information from the following report(s) SBAR was reviewed with the receiving nurse. Lines:   Peripheral IV 06/02/21 Left Antecubital (Active)   Site Assessment Clean, dry, & intact 06/02/21 1132   Phlebitis Assessment 0 06/02/21 1132   Dressing Status Clean, dry, & intact 06/02/21 1132   Dressing Type Transparent 06/02/21 1132   Hub Color/Line Status Blue 06/02/21 1132        Opportunity for questions and clarification was provided. Patient transported with:   Registered Nurse    12:29 PM  TRANSFER - IN REPORT:    Verbal report received from Damion Bailey RN on Gemma Hdz  being received from EP lab for routine progression of care      Report consisted of patients Situation, Background, Assessment and   Recommendations(SBAR). Information from the following report(s) SBAR was reviewed with the receiving nurse. Opportunity for questions and clarification was provided. Assessment completed upon patients arrival to unit and care assumed. 1:20 pm  Discharge instructions reviewed with patient and family. Voiced understanding. Patient given copy of discharge instructions to take home. 1:45 PM  Pt discharged via wheelchair with significant other. Personal belongings with patient upon discharge.

## 2021-06-02 NOTE — PROCEDURES
Successful FREDRICK/cardioversion to restore sinus rhythm      Plan:  Follow up in 2 weeks  Plan to discuss AF ablation       Cinthya Walsh MD

## 2021-06-02 NOTE — DISCHARGE INSTRUCTIONS
Discharge Instructions for Cardioversion    Your healthcare provider performed a procedure called cardioversion. Your healthcare provider used a controlled electric shock or a medicine to briefly stop all electrical activity in your heart. This helped restore your hearts normal rhythm. Here are some instructions to follow while you recover. Home care   Because cardioversion typically requires sedation, you won't be able to drive home. You will need a ride. Wait at least 24 hours before driving a car or operating heavy machinery after receiving sedating medicines.  Dont be alarmed if the skin on your chest is irritated or feels like it is sunburned. Your healthcare provider may prescribe a soothing lotion to relieve this discomfort. These minor symptoms will go away in a few days.  Ask your healthcare provider about medicines to keep your heart rhythm steady.  If you were prescribed medicine, take it as instructed by your healthcare provider. Dont skip doses or take double doses. Cardioversion requires blood thinners for at least 4 weeks to prevent a delayed risk of stroke when treating atrial fibrillation or atrial flutter. Be sure you discuss which medicine you are taking to prevent stroke. Ask when you need to have your medicine levels checked, and whether you may be able to stop taking it in the future or whether it is recommended that you take it for life. Some of these blood-thinning medicines will have the dose adjusted and interact with other medicines or foods. Your healthcare team will give you full instructions on what to watch out for. Report bleeding or symptoms of stroke immediately to your healthcare team and seek emergency medical attention.  Learn to take your own pulse. Keep a record of your results. Ask your healthcare provider when you should seek emergency medical attention. He or she will tell you which pulse rate reading is dangerous.       Keep in mind this procedure may need to be repeated if the abnormal heart rhythm returns. After the procedure, your healthcare provider will tell you if the treatment worked or if you will need further treatments or medication. Follow-up Care  Make a follow-up appointment, or as directed. Call 911  Call 911 right away if you have:     Chest pain     Shortness of breath     Loss of vision, speech, or strength or coordination in any body part    When to call your healthcare provider  Call your healthcare provider right away if you:     Feel faint, dizzy, or lightheaded     Have chest pain with increased activity     Have irregular heartbeat or fast pulse     Have bleeding issues from blood-thinning medicines.

## 2021-06-03 ENCOUNTER — TELEPHONE (OUTPATIENT)
Dept: CARDIOLOGY CLINIC | Age: 66
End: 2021-06-03

## 2021-06-03 NOTE — TELEPHONE ENCOUNTER
Patient would like to speak with someone regarding his cardioversion that he had done yesterday. Please advise.      Phone: [phone]

## 2021-06-17 ENCOUNTER — OFFICE VISIT (OUTPATIENT)
Dept: CARDIOLOGY CLINIC | Age: 66
End: 2021-06-17
Payer: COMMERCIAL

## 2021-06-17 VITALS
WEIGHT: 254 LBS | RESPIRATION RATE: 18 BRPM | HEART RATE: 59 BPM | SYSTOLIC BLOOD PRESSURE: 138 MMHG | DIASTOLIC BLOOD PRESSURE: 78 MMHG | HEIGHT: 74 IN | OXYGEN SATURATION: 100 % | BODY MASS INDEX: 32.6 KG/M2

## 2021-06-17 DIAGNOSIS — I48.91 ATRIAL FIBRILLATION, UNSPECIFIED TYPE (HCC): Primary | ICD-10-CM

## 2021-06-17 PROCEDURE — 99215 OFFICE O/P EST HI 40 MIN: CPT | Performed by: NURSE PRACTITIONER

## 2021-06-17 PROCEDURE — 93000 ELECTROCARDIOGRAM COMPLETE: CPT | Performed by: NURSE PRACTITIONER

## 2021-06-17 NOTE — LETTER
6/17/2021 Patient: Lila Blas YOB: 1955 Date of Visit: 6/17/2021 Ying Gramajo MD 
[address] [place] [id number] [address] Fax: [phone] Dear Ying Gramajo MD, Thank you for referring Mr. Monserrat Crenshaw to [address]  for evaluation. My notes for this consultation are attached. If you have questions, please do not hesitate to call me. I look forward to following your patient along with you. Sincerely, Tristin Koch NP

## 2021-06-17 NOTE — PROGRESS NOTES
HISTORY OF PRESENT ILLNESS      Jacklyn Rowland is a 61 y.o. male who underwent AFL ablation in the past with subsequent recurrence and repeat CTI ablation who continued to have symptomatic recurrent AFL (as well as PVCs). He then underwent repeat atypical/ right atrial flutter ablation (An area of slow conduction mid-RA was identified with delayed/fractionated potentials where ablation was performed and resulted in termination of the tachycardia nearly immediately following onset of RF energy. Bidirectional conduction block was demonstrated across the CTI). During last visit, he reported discomfort in left chest and fatigue with exertion recently. EKG shows AF with RVR at 133 bpm.      Plan for echocardiogram, lexiscan nuclear stress test (torn hamstring) to evaluate  of chest pain/HERRERA/fatigue with exertion. His diltiazem and eliquis were restarted and he underwent successful FREDRICK/cardioversion and now presents for follow up to discuss AF ablation. EKG shows sinus bradycardia and he reports improvement in exertional sob and energy level.          PAST MEDICAL HISTORY     Past Medical History:   Diagnosis Date    Abnormal echocardiogram 10/28/2010    Atrial flutter (HCC)     s/p ablation     Dyslipidemia     Gout     High blood pressure     High blood pressure     Obesity 10/28/2010    Prediabetes     [facility]state cancer ([facility] [address]     S/P ablation of atrial flutter     Supraventricular tachycardia ([facility] [address] 10/28/2010           PAST SURGICAL HISTORY     Past Surgical History:   Procedure Laterality Date    HX ACL RECONSTRUCTION  6/2013    menniscus    HX ROTATOR CUFF REPAIR Left 2005    HX ROTATOR CUFF REPAIR Right 2007    HX WISDOM TEETH EXTRACTION      INTRACARD ECHO, THER/DX INTERVENT N/A 1/7/2021    Intracardiac Echocardiogram performed by Tra Mae MD at [place] [address], [facility] CATH LAB    RI CARDIAC SURG PROCEDURE UNLIST  2016    cardiac ablation    RI COMPRE ELECTROPHYSIOL XM W/LEFT ATRIAL PACNG/REC N/A 12/22/2020    Lt Atrial Pace & Record During Ep Study performed by Isidra Cedeno MD at [facility], [facility] CATH LAB    NM COMPRE ELECTROPHYSIOL XM W/LEFT ATRIAL PACNG/REC N/A 1/26/2021    Lt Atrial Pace & Record During Ep Study performed by Isidra Cedeno MD at [place] [address], [facility] CATH LAB    NM EPHYS EVAL W/ABLATION [facility] ARRHYTHMIA N/A 12/22/2020    ABLATION SVT performed by Isidra Cedeno MD at [place] [address], [facility] CATH LAB    NM EPHYS EVAL W/ABLATION SUPRAVENT ARRHYTHMIA N/A 1/7/2021    Ablation A-Flutter performed by Isidra Cedeno MD at [place] [address], [facility] CATH LAB    NM EPHYS EVAL W/ABLATION SUPRAVENT ARRHYTHMIA N/A 1/26/2021    Ablation A-Flutter performed by Isidra Cedeno MD at [place] [address], [facility] CATH LAB    NM INTRACARDIAC ELECTROPHYSIOLOGIC 3D MAPPING N/A 12/22/2020    Ep 3d Mapping performed by Isidra Cedeno MD at [place] [address], [facility] CATH LAB    NM INTRACARDIAC ELECTROPHYSIOLOGIC 3D MAPPING N/A 1/7/2021    Ep 3d Mapping performed by Isidra Cedeno MD at [place] [address], [facility] CATH LAB    NM INTRACARDIAC ELECTROPHYSIOLOGIC 3D MAPPING N/A 1/26/2021    Ep 3d Mapping performed by Isidra Cedeno MD at [place] [address], [facility] CATH LAB          ALLERGIES     Allergies   Allergen Reactions    Percocet [Oxycodone-Acetaminophen] Itching     Pt has used hydrocodone          FAMILY HISTORY     Family History   Problem Relation Age of Onset    No Known Problems Mother     No Known Problems Father     Cancer Sister 43        breast    No Known Problems Sister     negative for cardiac disease       SOCIAL HISTORY     Social History     Socioeconomic History    Marital status: SINGLE     Spouse name: Not on file    Number of children: Not on file    Years of education: Not on file    Highest education level: Not on file   Tobacco Use    Smoking status: Never Smoker    Smokeless tobacco: Never Used   Substance and Sexual Activity    Alcohol use:  Yes     Alcohol/week: 12.0 standard drinks     Types: 12 Cans of beer per week     Comment: reports not drinking during the week, only on weekends    Drug use: No     Social Determinants of Health     Financial Resource Strain:     Difficulty of Paying Living Expenses:    Food Insecurity:     Worried About Running Out of Food in the Last Year:     [address] in the Last Year:    Transportation Needs:     Lack of Transportation (Medical):  Lack of Transportation (Non-Medical):    Physical Activity:     Days of Exercise per Week:     Minutes of Exercise per Session:    Stress:     Feeling of Stress :    Social Connections:     Frequency of Communication with Friends and Family:     Frequency of Social Gatherings with Friends and Family:     Attends Confucianist Services:     Active Member of Clubs or Organizations:     Attends Club or Organization Meetings:     Marital Status:          MEDICATIONS     Current Outpatient Medications   Medication Sig    HYDROcodone-acetaminophen (NORCO) 5-325 mg per tablet every four (4) hours as needed.  apixaban (Eliquis) 5 mg tablet Take 1 Tab by mouth two (2) times a day.  dilTIAZem ER (DILACOR XR) 120 mg capsule Take 1 Cap by mouth daily.  ibuprofen (MOTRIN) 600 mg tablet Take 600 mg by mouth every six (6) hours as needed for Pain.  allopurinoL (ZYLOPRIM) 100 mg tablet Take 100 mg by mouth daily. Switched from [facility] to allopurinol on 7/17/2020     No current facility-administered medications for this visit. I have reviewed the nurses notes, vitals, problem list, allergy list, medical history, family, social history and medications. REVIEW OF SYMPTOMS      General: Pt denies excessive weight gain or loss. Pt is able to conduct ADL's  HEENT: Denies blurred vision, headaches, hearing loss, epistaxis and difficulty swallowing. Respiratory: Denies cough, congestion, shortness of breath, HERRERA, wheezing or stridor.   Cardiovascular: Denies precordial pain, palpitations, edema or PND  Gastrointestinal: Denies poor appetite, indigestion, abdominal pain or blood in stool  Genitourinary: Denies hematuria, dysuria, increased urinary frequency  Musculoskeletal: Denies joint pain or swelling from muscles or joints  Neurologic: Denies tremor, paresthesias, headache, or sensory motor disturbance  Psychiatric: Denies confusion, insomnia, depression  Integumentray: Denies rash, itching or ulcers. Hematologic: Denies easy bruising, bleeding       PHYSICAL EXAMINATION      Vitals: see vitals section  General: Well developed, in no acute distress. HEENT: No jaundice, oral mucosa moist, no oral ulcers  Neck: Supple, no stiffness, no lymphadenopathy, supple  Heart:  Normal S1/S2 negative S3 or S4. Regular, no murmur, gallop or rub, no jugular venous distention  Respiratory: Clear bilaterally x 4, no wheezing or rales  Abdomen:   Soft, non-tender, bowel sounds are active. Extremities:  No edema, normal cap refill, no cyanosis. Musculoskeletal: No clubbing, no deformities  Neuro: A&Ox3, speech clear, gait stable, cooperative, no focal neurologic deficits  Skin: Skin color is normal. No rashes or lesions.  Non diaphoretic, moist.  Vascular: 2+ pulses symmetric in all extremities       DIAGNOSTIC DATA      EKG: sinus bradycardia     Visit Vitals  /78 (BP 1 Location: Left upper arm, BP Patient Position: Sitting, BP Cuff Size: Large adult)   Pulse (!) 59   Resp 18   Ht 6' 2\" (1.88 m)   Wt 254 lb (115.2 kg)   SpO2 100%   BMI 32.61 kg/m²          LABORATORY DATA      Lab Results   Component Value Date/Time    WBC 7.8 01/07/2021 07:52 AM    HGB 15.0 01/07/2021 07:52 AM    HCT 46.8 01/07/2021 07:52 AM    PLATELET 863 73/87/1203 07:52 AM    MCV 82.0 01/07/2021 07:52 AM      Lab Results   Component Value Date/Time    Sodium 137 06/02/2021 11:26 AM    Potassium 4.2 06/02/2021 11:26 AM    Chloride 105 06/02/2021 11:26 AM    CO2 27 06/02/2021 11:26 AM    Anion gap 5 06/02/2021 11:26 AM    Glucose 121 (H) 06/02/2021 11:26 AM    BUN 12 06/02/2021 11:26 AM Creatinine 0.92 06/02/2021 11:26 AM    BUN/Creatinine ratio 13 06/02/2021 11:26 AM    GFR est AA >60 06/02/2021 11:26 AM    GFR est non-AA >60 06/02/2021 11:26 AM    Calcium 9.2 06/02/2021 11:26 AM    Bilirubin, total 1.1 (H) 01/07/2021 07:52 AM    Alk. phosphatase 94 01/07/2021 07:52 AM    Protein, total 7.5 01/07/2021 07:52 AM    Albumin 4.0 01/07/2021 07:52 AM    Globulin 3.5 01/07/2021 07:52 AM    A-G Ratio 1.1 01/07/2021 07:52 AM    ALT (SGPT) 49 01/07/2021 07:52 AM         ASSESSMENT/PLAN      1. Atrial flutter              A. CTI ablation x 2             B. Right lateral atypical atrial flutter s/p ablation x2  - No evidence of recurrent atrial flutter following his ablations. 2. Atrial fibrillation  - S/p DCCV to NSR with improvement in symptoms. He has continued Eliquis for CVA risk reduction and Diltiazem. His goal is to avoid medical therapy for AF. Plan for AF ablation at [facility] with anesthesia, will work to discontinue diltiazem post ablation. Continue Eliquis. Discussed the procedure for AF ablation and provided labwork and testing slips. Will contact him via phone for scheduling. 3. PVCs  - Continue current medical therapy for PVC suppression. He would like to be off of all medications if possible. Will re-evaluate for PVC burden/ symptoms after AF ablation. 4. Right atrial scar  - Continue to monitor via annual/biannual echocardiograms. FOLLOW UP      Post ablation    Thank you, Guererro Perez MD for allowing me to participate in the care of this extraordinarily pleasant male. Please do not hesitate to contact me for further questions/concerns.        AKILAH [address]  [address] [facility], [address] [place] [address] Artie Dur[address]  [phone] / [phone] Fax   [phone] / [phone] Fax

## 2021-06-17 NOTE — PROGRESS NOTES
Room #: 2    Has questions about his upcoming stress test and it's necessity and what to expect. Visit Vitals  /78 (BP 1 Location: Left upper arm, BP Patient Position: Sitting, BP Cuff Size: Large adult)   Pulse (!) 59   Resp 18   Ht 6' 2\" (1.88 m)   Wt 254 lb (115.2 kg)   SpO2 100%   BMI 32.61 kg/m²         Chest pain:  NO  Shortness of breath:  NO  Edema: NO  Palpitations, skipped beats, rapid heartbeat:  NO  Dizziness:  NO    1. Have you been to the ER, urgent care clinic since your last visit? Hospitalized since your last visit? No    2. Have you seen or consulted any other health care providers outside of the [address] [place] [address] since your last visit? Include any pap smears or colon screening.  No      Refills:  NO

## 2021-06-21 ENCOUNTER — TELEPHONE (OUTPATIENT)
Dept: CARDIOLOGY CLINIC | Age: 66
End: 2021-06-21

## 2021-06-21 NOTE — LETTER
6/21/2021 2:17 PM 
 
Mr. Amy Alexandre 
[address] [facility] [address] You are scheduled for the following procedure with Dr. Jose L Cortes:  AFIB ablation at [facility], [address] [place] [address], [address] PLEASE be aware that your procedure date/time is tentative and subject to change due to emergency cases. Procedure date/time:    August 5, 2021  Time: 10:00 am - please arrive by 8:00 am 
 
 
ARRIVAL time:  (You will need  to be discharged home with.)  
 
o [facility] procedures: please arrive to check in on 2nd floor one hour prior to your procedure the day of your procedure. 
 
o [facility] procedures: arrive to check in on 1st floor near [facility] entrance two hours prior to your procedure. Pre-procedure Labs/Imaging: 
 
o PRE-PROCEDURE LABS NEEDED: YES  
o Forms for labwork may be given at appointment. If not, they will be mailed to you. Labs should be completed within 5-7 days of procedure. These can be done at any [email or web address] lab (one is located in [address] [place] [address]. No appointment needed). YES A COVID swab may be needed 4 days prior to your procedure. o YOU MAY NEED PRE-PROCEDURE IMAGING, CHEST CTA OR CARDIAC MRI.    
[]  Chest CTA []  Cardiac MRI    ([facility] scheduling to call you)  -  [phone] Medication Instructions: Do not stop the following blood thinner prior to procedure: Eliquis If you take any of the following Diabetic medications, please use as follows: 
 
[]  Glucophage/Metformin  -  Hold morning dose on day of procedure. 
 
[]  Insulin  -  Hold morning dose on day of procedure. 
 
[]  Lantus  -  Reduce dose by ½ evening before procedure. Nothing to eat or drink after midnight before your procedure. You may take all other medications as directed the morning of your procedure with a sip of water unless otherwise indicated.  
 
 
 
Please contact the office [phone] and ask for a member of  [facility]' team for procedure questions. There is a physician on call for the office after hours for immediate needs. FOLLOW UP: 
 Your appointments will be made for you post procedure based off of discharge instructions. You may have driving restrictions for a short time after your procedure (usually 2-3 days). Pacemaker/ICD patients will be unable to have an MRI until 6 weeks after implant, NO dental work for 8 weeks after your implant. Sincerely, Gladys Guadarrama MD  
Atrial Fibrillation Ablation Discharge Instructions You have just had an Atrial Fibrillation Ablation. There were catheters temporarily placed in your heart through a puncture in the Veins and/or Arteries in your groin. WHAT TO EXPECT  If you have had an Atrial Fibrillation Ablation please be aware that you may experience mild chest pain that will resolve within 24-48 hours. If the Chest Pain begins radiating down your shoulders or arms, becomes severe or breathing becomes difficult, call 911 or go to the closest emergency room.  Mild to moderate, non-painful, bruising or mild swelling at the puncture site is not un-common, and will resolve in 7  14 days, and may extend down your thigh as it heals. Application of Ice to the site may help with any tenderness.  If a closure device was used to seal your artery or vein, a separate pamphlet will be given to you with these discharge instructions. It is very important that you review the information in the pamphlet for different restrictions and precautions.  You have a small gauze dressing applied to the puncture site in your groin. You may remove this the following morning.  It is not uncommon to feel palpitations during the healing phase after your ablation. If you feel as though you are having recurrence of atrial fibrillation lasting longer than 30 minutes, please contact the office.  
 You MAY receive a 30 day heart monitor in the mail to wear after your procedure. This is to evaluate your heart rhythm post ablation. MEDICATIONS  Take only the medications prescribed to you at discharge. ACTIVITY  A responsible adult must take you home. Do not drive a car for 24 hours.  Rest quietly for the remainder of the day.  Do not lift anything greater than 10 pounds for 3 days.  Limit bending at the puncture site and use of stairs for at least 3 days.  You may remove the bandage and shower the morning after the procedure. Do not take a bath for 3 days. SYMPTOMS THAT NEED TO BE REPORTED IMMEDIATELY  Bleeding at the puncture site. If there is bleeding, lie down and hold firm direct pressure for at least 5 minutes. If the bleeding does not stop, go to the closest emergency room, or call 911.  Temperature more than 100.5 F. 
 Redness or warmth at the puncture site.  Increasing pain, numbness, coolness or blue discoloration of the extremity where the puncture is located.  Pulsating mass at the puncture site.  A new lump at the puncture site, or increasing swelling at the site. Please be aware that a pea or marble sized lump is normal, anything larger or increasing in size should be reported.  Bruising at the puncture site that enlarges or becomes painful (some bruising at the site is common and will go away in 7-14 days).  Rapid heart rate or palpitations.  Dizziness, lightheadedness, fainting.  REMEMBER: If you feel something is an emergency or cannot be handled over the phone, call 911 or go to the closest emergency room. FOLLOW UP CARE Kinga Gonzalez NP in 2 weeks Dr. Suzy Jc in 3 months Sridevi Marion MD 
Cardiac Electrophysiology / Cardiology [address] [place] [address], [address] [place] [address], [address] [facility] Bland[address] [facility] 
[phone] / [phone] Fax [phone] / [phone] Fax

## 2021-06-21 NOTE — TELEPHONE ENCOUNTER
Called patient, ID verified using two patient identifiers. Patient scheduled for a AFIB ablation with Dr. Genevieve Ibarra on Thursday, 8/5/21 at 10:00 am (patient to arrive by 8:00 am.)  Patient already given lab slips and covid swab orders at his appointment with Shy Auguste NP on 6/17/21. Reviewed pre-procedure instructions and time allowed for questions. Instructions to be mailed to patient's address confirmed on file. Patient verbalized understanding and will call with any other questions.

## 2021-06-23 ENCOUNTER — PREP FOR PROCEDURE (OUTPATIENT)
Dept: CARDIOLOGY CLINIC | Age: 66
End: 2021-06-23

## 2021-06-23 RX ORDER — SODIUM CHLORIDE 0.9 % (FLUSH) 0.9 %
5-40 SYRINGE (ML) INJECTION EVERY 8 HOURS
Status: CANCELLED | OUTPATIENT
Start: 2021-06-23

## 2021-06-23 RX ORDER — SODIUM CHLORIDE 0.9 % (FLUSH) 0.9 %
5-40 SYRINGE (ML) INJECTION AS NEEDED
Status: CANCELLED | OUTPATIENT
Start: 2021-06-23

## 2021-06-28 ENCOUNTER — TELEPHONE (OUTPATIENT)
Dept: CARDIOLOGY CLINIC | Age: 66
End: 2021-06-28

## 2021-06-28 RX ORDER — AMIODARONE HYDROCHLORIDE 200 MG/1
200 TABLET ORAL DAILY
Qty: 30 TABLET | Refills: 1 | Status: ON HOLD | OUTPATIENT
Start: 2021-06-28 | End: 2021-07-06

## 2021-06-28 NOTE — TELEPHONE ENCOUNTER
AKILAH Tucker RN  Caller: Unspecified (Today,  8:23 AM)  If we have availability we can move him up, in the  meantime will start AMiodarone 200mg daily      Attempted to reach patient by telephone to relay information above. HIPAA compliant message was left for return call.

## 2021-06-28 NOTE — TELEPHONE ENCOUNTER
AKILAH Yi, RN  Caller: Unspecified (Today,  8:23 AM)  If we have availability we can move him up, in the  meantime will start AMiodarone 200mg daily     Received return call from patient, ID verified using two patient identifiers. Notified patient of Steph Parra NP recommendations above. Patient agreeable. Notified patient that there is a earlier date available for his afib ablation if he would like to move up his date. Patient rescheduled for Tuesday, 7/6/21 at 8:00 am (patient to arrive at 6:00 am). Patient will obtain his labwork and Covid swab this week. Patient verbalized understanding and will call with any other questions. Requested Prescriptions     Signed Prescriptions Disp Refills    amiodarone (CORDARONE) 200 mg tablet 30 Tablet 1     Sig: Take 1 Tablet by mouth daily.      Authorizing Provider: Yeni Reyna     Ordering User: Adán Olmstead

## 2021-06-28 NOTE — TELEPHONE ENCOUNTER
Received call from patient, ID verified using two patient identifiers. Patient calling because he said he feels like he is back in afib. He states he started feeling the flutters in his chest on Saturday. He denies any shortness of breath, chest pain, or dizziness. He has continued to take his Diltiazem and Eliquis. Advised him that I will notify Tonny Coello NP and call him back if she wants to make any changes to his medications. Patient verbalized understanding and will call with any other questions.

## 2021-07-01 LAB
ALBUMIN SERPL-MCNC: 4.4 G/DL (ref 3.8–4.8)
ALBUMIN/GLOB SERPL: 1.8 {RATIO} (ref 1.2–2.2)
ALP SERPL-CCNC: 58 IU/L (ref 48–121)
ALT SERPL-CCNC: 16 IU/L (ref 0–44)
AST SERPL-CCNC: 18 IU/L (ref 0–40)
BASOPHILS # BLD AUTO: 0 X10E3/UL (ref 0–0.2)
BASOPHILS NFR BLD AUTO: 0 %
BILIRUB SERPL-MCNC: 0.6 MG/DL (ref 0–1.2)
BUN SERPL-MCNC: 12 MG/DL (ref 8–27)
BUN/CREAT SERPL: 14 (ref 10–24)
CALCIUM SERPL-MCNC: 9.6 MG/DL (ref 8.6–10.2)
CHLORIDE SERPL-SCNC: 103 MMOL/L (ref 96–106)
CO2 SERPL-SCNC: 26 MMOL/L (ref 20–29)
CREAT SERPL-MCNC: 0.87 MG/DL (ref 0.76–1.27)
EOSINOPHIL # BLD AUTO: 0.1 X10E3/UL (ref 0–0.4)
EOSINOPHIL NFR BLD AUTO: 2 %
ERYTHROCYTE [DISTWIDTH] IN BLOOD BY AUTOMATED COUNT: 13.2 % (ref 11.6–15.4)
GLOBULIN SER CALC-MCNC: 2.5 G/DL (ref 1.5–4.5)
GLUCOSE SERPL-MCNC: 118 MG/DL (ref 65–99)
HCT VFR BLD AUTO: 46.1 % (ref 37.5–51)
HGB BLD-MCNC: 15.1 G/DL (ref 13–17.7)
IMM GRANULOCYTES # BLD AUTO: 0 X10E3/UL (ref 0–0.1)
IMM GRANULOCYTES NFR BLD AUTO: 0 %
INR PPP: 1 (ref 0.9–1.2)
LYMPHOCYTES # BLD AUTO: 2 X10E3/UL (ref 0.7–3.1)
LYMPHOCYTES NFR BLD AUTO: 28 %
MCH RBC QN AUTO: 27.4 PG (ref 26.6–33)
MCHC RBC AUTO-ENTMCNC: 32.8 G/DL (ref 31.5–35.7)
MCV RBC AUTO: 84 FL (ref 79–97)
MONOCYTES # BLD AUTO: 0.7 X10E3/UL (ref 0.1–0.9)
MONOCYTES NFR BLD AUTO: 10 %
NEUTROPHILS # BLD AUTO: 4.4 X10E3/UL (ref 1.4–7)
NEUTROPHILS NFR BLD AUTO: 60 %
PLATELET # BLD AUTO: 168 X10E3/UL (ref 150–450)
POTASSIUM SERPL-SCNC: 4.3 MMOL/L (ref 3.5–5.2)
PROT SERPL-MCNC: 6.9 G/DL (ref 6–8.5)
PROTHROMBIN TIME: 10.5 SEC (ref 9.1–12)
RBC # BLD AUTO: 5.51 X10E6/UL (ref 4.14–5.8)
SODIUM SERPL-SCNC: 141 MMOL/L (ref 134–144)
WBC # BLD AUTO: 7.3 X10E3/UL (ref 3.4–10.8)

## 2021-07-02 ENCOUNTER — TRANSCRIBE ORDER (OUTPATIENT)
Dept: REGISTRATION | Age: 66
End: 2021-07-02

## 2021-07-02 ENCOUNTER — HOSPITAL ENCOUNTER (OUTPATIENT)
Dept: LAB | Age: 66
Discharge: HOME | End: 2021-07-02
Payer: COMMERCIAL

## 2021-07-02 DIAGNOSIS — Z01.812 PRE-PROCEDURAL LABORATORY EXAMINATIONS: Primary | ICD-10-CM

## 2021-07-02 DIAGNOSIS — Z01.812 PRE-PROCEDURAL LABORATORY EXAMINATIONS: ICD-10-CM

## 2021-07-02 PROCEDURE — U0003 INFECTIOUS AGENT DETECTION BY NUCLEIC ACID (DNA OR RNA); SEVERE ACUTE RESPIRATORY SYNDROME CORONAVIRUS 2 (SARS-COV-2) (CORONAVIRUS DISEASE [COVID-19]), AMPLIFIED PROBE TECHNIQUE, MAKING USE OF HIGH THROUGHPUT TECHNOLOGIES AS DESCRIBED BY CMS-2020-01-R: HCPCS

## 2021-07-03 LAB
SARS-COV-2, XPLCVT: NOT DETECTED
SOURCE, COVRS: NORMAL

## 2021-07-06 ENCOUNTER — APPOINTMENT (OUTPATIENT)
Dept: CARDIAC CATH/INVASIVE PROCEDURES | Age: 66
End: 2021-07-06
Attending: INTERNAL MEDICINE
Payer: COMMERCIAL

## 2021-07-06 ENCOUNTER — ANESTHESIA (OUTPATIENT)
Dept: CARDIAC CATH/INVASIVE PROCEDURES | Age: 66
End: 2021-07-06
Payer: COMMERCIAL

## 2021-07-06 ENCOUNTER — ANESTHESIA EVENT (OUTPATIENT)
Dept: CARDIAC CATH/INVASIVE PROCEDURES | Age: 66
End: 2021-07-06
Payer: COMMERCIAL

## 2021-07-06 ENCOUNTER — HOSPITAL ENCOUNTER (OUTPATIENT)
Age: 66
Setting detail: OUTPATIENT SURGERY
Discharge: HOME | End: 2021-07-06
Attending: INTERNAL MEDICINE | Admitting: INTERNAL MEDICINE
Payer: COMMERCIAL

## 2021-07-06 VITALS
RESPIRATION RATE: 20 BRPM | HEART RATE: 75 BPM | HEIGHT: 74 IN | TEMPERATURE: 97.5 F | DIASTOLIC BLOOD PRESSURE: 88 MMHG | OXYGEN SATURATION: 97 % | BODY MASS INDEX: 32.08 KG/M2 | WEIGHT: 250 LBS | SYSTOLIC BLOOD PRESSURE: 116 MMHG

## 2021-07-06 DIAGNOSIS — I48.0 AF (PAROXYSMAL ATRIAL FIBRILLATION) (HCC): ICD-10-CM

## 2021-07-06 LAB
ABO + RH BLD: NORMAL
ACT BLD: 219 SECS (ref 79–138)
ACT BLD: 235 SECS (ref 79–138)
BLOOD GROUP ANTIBODIES SERPL: NORMAL
SPECIMEN EXP DATE BLD: NORMAL

## 2021-07-06 PROCEDURE — 77030013797 HC KT TRNSDUC PRSSR EDWD -A: Performed by: INTERNAL MEDICINE

## 2021-07-06 PROCEDURE — 77030029065 HC DRSG HEMO QCLOT ZMED -B: Performed by: INTERNAL MEDICINE

## 2021-07-06 PROCEDURE — 93656 COMPRE EP EVAL ABLTJ ATR FIB: CPT | Performed by: INTERNAL MEDICINE

## 2021-07-06 PROCEDURE — 93657 TX L/R ATRIAL FIB ADDL: CPT | Performed by: INTERNAL MEDICINE

## 2021-07-06 PROCEDURE — 93613 INTRACARDIAC EPHYS 3D MAPG: CPT | Performed by: INTERNAL MEDICINE

## 2021-07-06 PROCEDURE — 77030008684 HC TU ET CUF COVD -B: Performed by: NURSE ANESTHETIST, CERTIFIED REGISTERED

## 2021-07-06 PROCEDURE — C1759 CATH, INTRA ECHOCARDIOGRAPHY: HCPCS | Performed by: INTERNAL MEDICINE

## 2021-07-06 PROCEDURE — C1893 INTRO/SHEATH, FIXED,NON-PEEL: HCPCS | Performed by: INTERNAL MEDICINE

## 2021-07-06 PROCEDURE — 74011000250 HC RX REV CODE- 250: Performed by: NURSE ANESTHETIST, CERTIFIED REGISTERED

## 2021-07-06 PROCEDURE — 77030027107 HC PTCH EXT REF CARTO3 J&J -F: Performed by: INTERNAL MEDICINE

## 2021-07-06 PROCEDURE — 74011250637 HC RX REV CODE- 250/637: Performed by: INTERNAL MEDICINE

## 2021-07-06 PROCEDURE — C1732 CATH, EP, DIAG/ABL, 3D/VECT: HCPCS | Performed by: INTERNAL MEDICINE

## 2021-07-06 PROCEDURE — 76060000034 HC ANESTHESIA 1.5 TO 2 HR: Performed by: INTERNAL MEDICINE

## 2021-07-06 PROCEDURE — 77030029359 HC PRB ESOPH TEMP CATH ANTM -F: Performed by: INTERNAL MEDICINE

## 2021-07-06 PROCEDURE — 36415 COLL VENOUS BLD VENIPUNCTURE: CPT

## 2021-07-06 PROCEDURE — 77030041242 HC CBL CONN CARTO 3 J&J -D: Performed by: INTERNAL MEDICINE

## 2021-07-06 PROCEDURE — 77030026438 HC STYL ET INTUB CARD -A: Performed by: NURSE ANESTHETIST, CERTIFIED REGISTERED

## 2021-07-06 PROCEDURE — 77030039046 HC PAD DEFIB RADIOTRNSPNT CNMD -B: Performed by: INTERNAL MEDICINE

## 2021-07-06 PROCEDURE — 93662 INTRACARDIAC ECG (ICE): CPT | Performed by: INTERNAL MEDICINE

## 2021-07-06 PROCEDURE — 77030020506 HC NDL TRNSPTL NRG BAYL -F: Performed by: INTERNAL MEDICINE

## 2021-07-06 PROCEDURE — 77030035291 HC TBNG PMP SMARTABLATE J&J -B: Performed by: INTERNAL MEDICINE

## 2021-07-06 PROCEDURE — 86901 BLOOD TYPING SEROLOGIC RH(D): CPT

## 2021-07-06 PROCEDURE — 74011250636 HC RX REV CODE- 250/636: Performed by: NURSE ANESTHETIST, CERTIFIED REGISTERED

## 2021-07-06 PROCEDURE — C1894 INTRO/SHEATH, NON-LASER: HCPCS | Performed by: INTERNAL MEDICINE

## 2021-07-06 PROCEDURE — C1733 CATH, EP, OTHR THAN COOL-TIP: HCPCS | Performed by: INTERNAL MEDICINE

## 2021-07-06 PROCEDURE — 74011250636 HC RX REV CODE- 250/636: Performed by: INTERNAL MEDICINE

## 2021-07-06 PROCEDURE — C1769 GUIDE WIRE: HCPCS | Performed by: INTERNAL MEDICINE

## 2021-07-06 PROCEDURE — C1760 CLOSURE DEV, VASC: HCPCS | Performed by: INTERNAL MEDICINE

## 2021-07-06 PROCEDURE — 77030041009 HC ADTR CBL EP DX J&J -D: Performed by: INTERNAL MEDICINE

## 2021-07-06 PROCEDURE — 85347 COAGULATION TIME ACTIVATED: CPT

## 2021-07-06 RX ORDER — PROPOFOL 10 MG/ML
INJECTION, EMULSION INTRAVENOUS AS NEEDED
Status: DISCONTINUED | OUTPATIENT
Start: 2021-07-06 | End: 2021-07-06 | Stop reason: HOSPADM

## 2021-07-06 RX ORDER — HEPARIN SODIUM 1000 [USP'U]/ML
INJECTION, SOLUTION INTRAVENOUS; SUBCUTANEOUS AS NEEDED
Status: DISCONTINUED | OUTPATIENT
Start: 2021-07-06 | End: 2021-07-06 | Stop reason: HOSPADM

## 2021-07-06 RX ORDER — PROTAMINE SULFATE 10 MG/ML
INJECTION, SOLUTION INTRAVENOUS AS NEEDED
Status: DISCONTINUED | OUTPATIENT
Start: 2021-07-06 | End: 2021-07-06 | Stop reason: HOSPADM

## 2021-07-06 RX ORDER — SODIUM CHLORIDE 0.9 % (FLUSH) 0.9 %
5-40 SYRINGE (ML) INJECTION AS NEEDED
Status: DISCONTINUED | OUTPATIENT
Start: 2021-07-06 | End: 2021-07-06 | Stop reason: HOSPADM

## 2021-07-06 RX ORDER — FENTANYL CITRATE 50 UG/ML
INJECTION, SOLUTION INTRAMUSCULAR; INTRAVENOUS AS NEEDED
Status: DISCONTINUED | OUTPATIENT
Start: 2021-07-06 | End: 2021-07-06 | Stop reason: HOSPADM

## 2021-07-06 RX ORDER — FENTANYL CITRATE 50 UG/ML
INJECTION, SOLUTION INTRAMUSCULAR; INTRAVENOUS AS NEEDED
Status: DISCONTINUED | OUTPATIENT
Start: 2021-07-06 | End: 2021-07-06

## 2021-07-06 RX ORDER — SODIUM CHLORIDE 9 MG/ML
25 INJECTION, SOLUTION INTRAVENOUS CONTINUOUS
Status: DISCONTINUED | OUTPATIENT
Start: 2021-07-06 | End: 2021-07-06 | Stop reason: HOSPADM

## 2021-07-06 RX ORDER — ROCURONIUM BROMIDE 10 MG/ML
INJECTION, SOLUTION INTRAVENOUS AS NEEDED
Status: DISCONTINUED | OUTPATIENT
Start: 2021-07-06 | End: 2021-07-06 | Stop reason: HOSPADM

## 2021-07-06 RX ORDER — LIDOCAINE HYDROCHLORIDE 20 MG/ML
INJECTION, SOLUTION EPIDURAL; INFILTRATION; INTRACAUDAL; PERINEURAL AS NEEDED
Status: DISCONTINUED | OUTPATIENT
Start: 2021-07-06 | End: 2021-07-06 | Stop reason: HOSPADM

## 2021-07-06 RX ORDER — SODIUM CHLORIDE 0.9 % (FLUSH) 0.9 %
5-40 SYRINGE (ML) INJECTION EVERY 8 HOURS
Status: DISCONTINUED | OUTPATIENT
Start: 2021-07-06 | End: 2021-07-06 | Stop reason: HOSPADM

## 2021-07-06 RX ORDER — DEXAMETHASONE SODIUM PHOSPHATE 4 MG/ML
INJECTION, SOLUTION INTRA-ARTICULAR; INTRALESIONAL; INTRAMUSCULAR; INTRAVENOUS; SOFT TISSUE AS NEEDED
Status: DISCONTINUED | OUTPATIENT
Start: 2021-07-06 | End: 2021-07-06 | Stop reason: HOSPADM

## 2021-07-06 RX ORDER — MIDAZOLAM HYDROCHLORIDE 1 MG/ML
INJECTION, SOLUTION INTRAMUSCULAR; INTRAVENOUS AS NEEDED
Status: DISCONTINUED | OUTPATIENT
Start: 2021-07-06 | End: 2021-07-06 | Stop reason: HOSPADM

## 2021-07-06 RX ORDER — ONDANSETRON 2 MG/ML
4 INJECTION INTRAMUSCULAR; INTRAVENOUS
Status: DISCONTINUED | OUTPATIENT
Start: 2021-07-06 | End: 2021-07-06 | Stop reason: HOSPADM

## 2021-07-06 RX ORDER — PANTOPRAZOLE SODIUM 40 MG/1
40 TABLET, DELAYED RELEASE ORAL DAILY
Qty: 30 TABLET | Refills: 0 | Status: SHIPPED | OUTPATIENT
Start: 2021-07-06 | End: 2021-10-20

## 2021-07-06 RX ORDER — PHENYLEPHRINE HCL IN 0.9% NACL 0.4MG/10ML
SYRINGE (ML) INTRAVENOUS AS NEEDED
Status: DISCONTINUED | OUTPATIENT
Start: 2021-07-06 | End: 2021-07-06 | Stop reason: HOSPADM

## 2021-07-06 RX ORDER — ONDANSETRON 2 MG/ML
INJECTION INTRAMUSCULAR; INTRAVENOUS AS NEEDED
Status: DISCONTINUED | OUTPATIENT
Start: 2021-07-06 | End: 2021-07-06 | Stop reason: HOSPADM

## 2021-07-06 RX ORDER — SUCCINYLCHOLINE CHLORIDE 20 MG/ML
INJECTION INTRAMUSCULAR; INTRAVENOUS AS NEEDED
Status: DISCONTINUED | OUTPATIENT
Start: 2021-07-06 | End: 2021-07-06 | Stop reason: HOSPADM

## 2021-07-06 RX ADMIN — DEXAMETHASONE SODIUM PHOSPHATE 4 MG: 4 INJECTION, SOLUTION INTRAMUSCULAR; INTRAVENOUS at 10:08

## 2021-07-06 RX ADMIN — Medication 80 MCG: at 10:18

## 2021-07-06 RX ADMIN — HEPARIN SODIUM 4000 UNITS: 1000 INJECTION, SOLUTION INTRAVENOUS; SUBCUTANEOUS at 10:33

## 2021-07-06 RX ADMIN — SUCCINYLCHOLINE CHLORIDE 140 MG: 20 INJECTION, SOLUTION INTRAMUSCULAR; INTRAVENOUS at 09:50

## 2021-07-06 RX ADMIN — SODIUM CHLORIDE 25 ML/HR: 9 INJECTION, SOLUTION INTRAVENOUS at 07:30

## 2021-07-06 RX ADMIN — PHENYLEPHRINE HYDROCHLORIDE 40 MCG/MIN: 10 INJECTION INTRAVENOUS at 10:04

## 2021-07-06 RX ADMIN — ROCURONIUM BROMIDE 5 MG: 10 SOLUTION INTRAVENOUS at 09:49

## 2021-07-06 RX ADMIN — PROPOFOL 125 MG: 10 INJECTION, EMULSION INTRAVENOUS at 09:49

## 2021-07-06 RX ADMIN — PROTAMINE SULFATE 10 MG: 10 INJECTION, SOLUTION INTRAVENOUS at 11:13

## 2021-07-06 RX ADMIN — FENTANYL CITRATE 50 MCG: 50 INJECTION, SOLUTION INTRAMUSCULAR; INTRAVENOUS at 09:47

## 2021-07-06 RX ADMIN — HEPARIN SODIUM 9000 UNITS: 1000 INJECTION, SOLUTION INTRAVENOUS; SUBCUTANEOUS at 10:16

## 2021-07-06 RX ADMIN — HEPARIN SODIUM 3000 UNITS: 1000 INJECTION, SOLUTION INTRAVENOUS; SUBCUTANEOUS at 10:53

## 2021-07-06 RX ADMIN — PROTAMINE SULFATE 10 MG: 10 INJECTION, SOLUTION INTRAVENOUS at 11:11

## 2021-07-06 RX ADMIN — PROPOFOL 5 MG: 10 INJECTION, EMULSION INTRAVENOUS at 09:59

## 2021-07-06 RX ADMIN — FENTANYL CITRATE 50 MCG: 50 INJECTION, SOLUTION INTRAMUSCULAR; INTRAVENOUS at 09:49

## 2021-07-06 RX ADMIN — MIDAZOLAM 2 MG: 1 INJECTION INTRAMUSCULAR; INTRAVENOUS at 09:43

## 2021-07-06 RX ADMIN — PROTAMINE SULFATE 20 MG: 10 INJECTION, SOLUTION INTRAVENOUS at 11:14

## 2021-07-06 RX ADMIN — LIDOCAINE HYDROCHLORIDE 60 MG: 20 INJECTION, SOLUTION EPIDURAL; INFILTRATION; INTRACAUDAL; PERINEURAL at 09:49

## 2021-07-06 RX ADMIN — PROPOFOL 25 MG: 10 INJECTION, EMULSION INTRAVENOUS at 09:50

## 2021-07-06 RX ADMIN — APIXABAN 5 MG: 5 TABLET, FILM COATED ORAL at 13:35

## 2021-07-06 RX ADMIN — ONDANSETRON HYDROCHLORIDE 4 MG: 2 INJECTION, SOLUTION INTRAMUSCULAR; INTRAVENOUS at 10:08

## 2021-07-06 NOTE — PROGRESS NOTES
Obstructive Sleep Apnea education discussed including taking post-op analgesics as prescribed, awareness of how opioid analgesics      affect sleep apnea, having a caregiver stay with you for 24 hrs., sleep with your CPAP machine in use, and follow up with a      physician who can perform sleep studies. Pt understands and agrees.

## 2021-07-06 NOTE — PROGRESS NOTES
TRANSFER - IN REPORT:    Verbal report received from Agustina Birch, [facility] and CRNA on Eyad Kolb  being received from procedure for routine progression of care. Report consisted of patients Situation, Background, Assessment and Recommendations(SBAR). Information from the following report(s) Procedure Summary, Intake/Output, MAR, Accordion, Recent Results and Med Rec Status was reviewed with the receiving clinician. Opportunity for questions and clarification was provided. Assessment completed upon patients arrival to [address] [place] [address] and care assumed. Cardiac Cath Lab Recovery Arrival Note:    Eyad Kolb arrived to [facility] recovery area. Patient procedure= FREDRICK/EPS/ablation. Patient on cardiac monitor, non-invasive blood pressure, SPO2 monitor. On room air. IV  of nacl on pump at 25 ml/hr. Patient status doing well without problems. Patient is A&Ox 4. Patient reports no complaints. PROCEDURE SITE CHECK:    Procedure site:without any bleeding and or hematoma, no pain/discomfort reported at procedure site. No change in patient status. Continue to monitor patient and status.

## 2021-07-06 NOTE — Clinical Note
Sheath #1: Sheath: inserted. Sheath inserted/placed in the right femoral  vein. Hemostasis achieved. Upon evaluation of the common femoral artery stick using fluoroscopy, the access site puncture was within the safe zone.

## 2021-07-06 NOTE — ANESTHESIA POSTPROCEDURE EVALUATION
Post-Anesthesia Evaluation and Assessment    Patient: RAMÓN Fam MRN: [id number]  SSN: [id number]    YOB: 1955  Age: 61 y.o. Sex: male      I have evaluated the patient and they are stable and ready for discharge from the PACU. Cardiovascular Function/Vital Signs  Visit Vitals  /80 (BP 1 Location: Right arm, BP Patient Position: At rest)   Pulse 75   Temp 36.4 °C (97.5 °F)   Resp 20   Ht 6' 2\" (1.88 m)   Wt 113.4 kg (250 lb)   SpO2 (!) 89%   BMI 32.10 kg/m²       Patient is status post General anesthesia for Procedure(s):  ABLATION A-FIB  W COMPLETE EP STUDY  Ep 3d Mapping  Intracardiac Echocardiogram  Ablation Following A-Fib  Addl. Nausea/Vomiting: None    Postoperative hydration reviewed and adequate. Pain:  Pain Scale 1: Numeric (0 - 10) (07/06/21 0900)  Pain Intensity 1: 0 (07/06/21 0900)   Managed    Neurological Status: At baseline    Mental Status, Level of Consciousness: Alert and  oriented to person, place, and time    Pulmonary Status:   O2 Device: None (07/06/21 1141)   Adequate oxygenation and airway patent    Complications related to anesthesia: None    Post-anesthesia assessment completed. No concerns    Signed By: Cicero Sandifer, MD     July 6, 2021              Procedure(s):  ABLATION A-FIB  W COMPLETE EP STUDY  Ep 3d Mapping  Intracardiac Echocardiogram  Ablation Following A-Fib  Addl.    general - backup, general    <[email or web address]>    INITIAL Post-op Vital signs:   Vitals Value Taken Time   /76 07/06/21 1215   Temp 36.4 °C (97.5 °F) 07/06/21 1155   Pulse 70 07/06/21 1229   Resp 20 07/06/21 1155   SpO2 94 % 07/06/21 1229   Vitals shown include unvalidated device data.

## 2021-07-06 NOTE — PROGRESS NOTES
Tolerated drink, refused food. Discharge instructions reviewed with pt.  1330 ambulated 100 ft, gait steady, voided, back to stretcher right groin dsg D&I, assisted with dressing. 1415  Discharged via w/c with friend (Liborio ), instructions, and belongings.

## 2021-07-06 NOTE — DISCHARGE INSTRUCTIONS
Electrophysiology Study (EPS)/Cardiac Ablation   Discharge Instructions      You have just had a Cardiac Ablation for: atrial fibrillation. There were catheters temporarily placed in your heart through a puncture in the Veins and/or Arteries in your groin. WHAT TO EXPECT      If you have had a Cardiac Ablation please be aware that you may experience mild chest pain that will resolve within 24-48 hours. If the Chest Pain begins radiating down your shoulders or arms, becomes severe or breathing becomes difficult, call 911 or go to the closest emergency room.  Mild to moderate, non-painful, bruising or mild swelling at the puncture site is not un-common, and will resolve in 7 - 14 days, and may extend down your thigh as it heals.  If a closure device was used to seal your artery or vein, a separate pamphlet will be given to you with these discharge instructions. It is very important that you review the information in the pamphlet for different restrictions and precautions.  You have a small gauze dressing applied to the puncture site in your groin. You may remove this the following morning.  You MAY receive a 30 day heart monitor in the mail to wear after your procedure. This is to evaluate your heart rhythm post ablation. MEDICATIONS      Take only the medications prescribed to you at discharge. ACTIVITY      A responsible adult must take you home. Do not drive a car for 82-62 hours.  Rest quietly for the remainder of the day.  Do not lift anything greater than 10 pounds for 3 days.  Limit bending at the puncture site and use of stairs for at least 3 days.  You may remove the bandage and shower the morning after the procedure. Do not take a bath for 3 days.  You may resume normal activities after 1 week. SYMPTOMS THAT NEED TO BE REPORTED IMMEDIATELY      Bleeding at the puncture site.   If there is bleeding, lie down and hold firm direct pressure for at least 5 minutes. If the bleeding does not stop, go to the closest emergency room, or call 911.  Temperature more than 100.5 F.   Redness or warmth at the puncture site.  Increasing pain, numbness, coolness or blue discoloration of the extremity where the puncture is located.  Pulsating mass at the puncture site.  A new lump at the puncture site, or increasing swelling at the site. Please be aware that a pea or marble sized lump is normal, anything larger or increasing in size should be reported.  Bruising at the puncture site that enlarges or becomes painful (some bruising at the site is common and will go away in 7-14 days).  Rapid heart rate or palpitations.  Dizziness, lightheadedness, fainting.  REMEMBER: If you feel something is an emergency or cannot be handled over the phone, call 911 or go to the closest emergency room.       FOLLOW UP CARE     Rosa M Hoover NP in 2 weeks  Dr. Kelsea Park in 3 months        Cale Cosby MD  Cardiac Electrophysiology / Cardiology    [address]  [address] [place], [place] [address]  [phone] / [phone] Fax   [phone] / [phone] Fax

## 2021-07-06 NOTE — PROGRESS NOTES
Cardiac Cath Lab Recovery Arrival Note:      Ravi Hackett arrived to Cardiac Cath Lab, Recovery Area. Staff introduced to patient. Patient identifiers verified with NAME and DATE OF BIRTH. Procedure verified with patient. Consent forms reviewed and signed by patient or authorized representative and verified. Allergies verified. Patient and family oriented to department. Patient and family informed of procedure and plan of care. Questions answered with review. Patient prepped for procedure, per orders from physician, prior to arrival.    Patient on cardiac monitor, non-invasive blood pressure, SPO2 monitor. On room air. Patient is A&Ox 4. Patient reports no complaints. Patient in stretcher, in low position, with side rails up, call bell within reach, patient instructed to call if assistance as needed. Patient prep in: [address], [place]. Patient family has pager # [phone]  Family in: outside hospital  [place] Arrant (friend)  [phone].    Prep by: Maximo Isidro RN    692 Anesthesia in to talk with pt  Pre EPS/FREDRICK  Teaching completed    65 Dr Linda Venegas in to talk with pt

## 2021-07-06 NOTE — PROGRESS NOTES
TRANSFER - OUT REPORT:    Verbal report given to Adi Montilla RN on Mounika Gallegos being transferred to cath lab recovery for routine progression of care       Report consisted of patients Situation, Background, Assessment and   Recommendations(SBAR). Information from the following report(s) Procedure Summary was reviewed with the receiving nurse. Opportunity for questions and clarification was provided. Cardiac Cath Lab Procedure Area Arrival Note:    Mounika Gallegos arrived to Cardiac Cath Lab, Procedure Area. Patient identifiers verified with NAME and DATE OF BIRTH. Procedure verified with patient. Consent forms verified. Allergies verified. Patient informed of procedure and plan of care. Questions answered with review. Patient voiced understanding of procedure and plan of care. Patient on cardiac monitor, non-invasive blood pressure, SPO2 monitor. Airway mangement and monitoring along with all vital signs to be performed by CRNA. Patient status doing well without problems. Patient is A&Ox 4. Patient reports no pain. Patient medicated during procedure with orders obtained and verified by Dr. Kuldip Cheung. Refer to patients Cardiac Cath Lab PROCEDURE REPORT for vital signs, assessment, status, and response during procedure, printed at end of case. Printed report on chart or scanned into chart.

## 2021-07-06 NOTE — H&P
HISTORY OF PRESENTING ILLNESS      Zeus Cook is a 61 y.o. male who underwent AFL ablation in the past with subsequent recurrence and repeat CTI ablation last month whose monitor shows recurrent AFL (as well as PVCs). He is very symptomatic with HERRERA. Has been compliant with medications including anticoagulation since his procedure.   [facility] then underwent repeat atypical/ right atrial flutter ablation (An area of slow conduction mid-RA was identified with delayed/fractionated potentials where ablation was performed and resulted in termination of the tachycardia nearly immediately following onset of RF energy. Bidirectional conduction block was demonstrated across the CTI).    Repeat monitor report with atypical atrial flutter and PVCs. He was  asymptomatic- feeling improvement since his procedure. Instructed him to restart his Diltiazem 120 mg daily and underwent repeat atypical flutter ablation.      Reported discomfort in left chest recently. He has noted fatigue with exertion recently. Tore his right hamstring yesterday.  EKG today shows AF with RVR at 133 bpm.          PAST MEDICAL HISTORY           Past Medical History:   Diagnosis Date    Abnormal echocardiogram 10/28/2010    Atrial flutter (HCC)       s/p ablation     Dyslipidemia      Gout      High blood pressure      High blood pressure      Obesity 10/28/2010    Prediabetes      [facility] ([facility]      S/P ablation of atrial flutter      Supraventricular tachycardia ([facility] [address] 10/28/2010             PAST SURGICAL HISTORY            Past Surgical History:   Procedure Laterality Date    HX ACL RECONSTRUCTION   6/2013     menniscus    HX ROTATOR CUFF REPAIR Left 2005    HX ROTATOR CUFF REPAIR Right 2007    HX WISDOM TEETH EXTRACTION        INTRACARD ECHO, THER/DX INTERVENT N/A 1/7/2021     Intracardiac Echocardiogram performed by Chary Victor MD at [place] [address], [facility] [address] CATH LAB    HI CARDIAC SURG PROCEDURE UNLIST   2016     cardiac ablation    CO COMPRE ELECTROPHYSIOL XM W/LEFT ATRIAL PACNG/REC N/A 12/22/2020     Lt Atrial Pace & Record During Ep Study performed by Deo Wood MD at [place] [address], [facility] CATH LAB    CO COMPRE ELECTROPHYSIOL XM W/LEFT ATRIAL PACNG/REC N/A 1/26/2021     Lt Atrial Pace & Record During Ep Study performed by Deo Wood MD at [place] [address], [facility] [address] CATH LAB    CO EPHYS EVAL W/ABLATION SUPRAVENT ARRHYTHMIA N/A 12/22/2020     ABLATION SVT performed by Deo Wood MD at [place] [address], [facility] [address] CATH LAB    CO EPHYS EVAL W/ABLATION SUPRAVENT ARRHYTHMIA N/A 1/7/2021     Ablation A-Flutter performed by Deo Wood MD at [place] [address], [facility] [address] CATH LAB    CO EPHYS EVAL W/ABLATION SUPRAVENT ARRHYTHMIA N/A 1/26/2021     Ablation A-Flutter performed by Deo Wood MD at [place] [address], [facility] CATH LAB    CO INTRACARDIAC ELECTROPHYSIOLOGIC 3D MAPPING N/A 12/22/2020     Ep 3d Mapping performed by Deo Wood MD at [place] [address], [facility] CATH LAB    CO INTRACARDIAC ELECTROPHYSIOLOGIC 3D MAPPING N/A 1/7/2021     Ep 3d Mapping performed by Deo Wood MD at [place] [address], [facility] CATH LAB    CO INTRACARDIAC ELECTROPHYSIOLOGIC 3D MAPPING N/A 1/26/2021     Ep 3d Mapping performed by Deo Wood MD at [place] [address], [facility] CATH LAB             ALLERGIES            Allergies   Allergen Reactions    Percocet [Oxycodone-Acetaminophen] Itching       Pt has used hydrocodone            FAMILY HISTORY            Family History   Problem Relation Age of Onset    No Known Problems Mother      No Known Problems Father      Cancer Sister 43         breast    No Known Problems Sister      negative for cardiac disease         SOCIAL HISTORY      Social History            Socioeconomic History    Marital status: SINGLE       Spouse name: Not on file    Number of children: Not on file    Years of education: Not on file    Highest education level: Not on file   Tobacco Use    Smoking status: Never Smoker    Smokeless tobacco: Never Used   Substance and Sexual Activity    Alcohol use:  Yes       Alcohol/week: 12.0 standard drinks       Types: 12 Cans of beer per week       Comment: reports not drinking during the week, only on weekends    Drug use: No      Social Determinants of Health          Financial Resource Strain:     Difficulty of Paying Living Expenses:    Food Insecurity:     Worried About Running Out of Food in the Last Year:     [address] in the Last Year:    Transportation Needs:     Lack of Transportation (Medical):  Lack of Transportation (Non-Medical):    Physical Activity:     Days of Exercise per Week:     Minutes of Exercise per Session:    Stress:     Feeling of Stress :    Social Connections:     Frequency of Communication with Friends and Family:     Frequency of Social Gatherings with Friends and Family:     Attends [employer] Services:     Active Member of Clubs or Organizations:     Attends Club or Organization Meetings:     Marital Status:             MEDICATIONS           Current Outpatient Medications   Medication Sig    apixaban (Eliquis) 5 mg tablet Take 1 Tab by mouth two (2) times a day.  dilTIAZem ER (DILACOR XR) 120 mg capsule Take 1 Cap by mouth daily.  ibuprofen (MOTRIN) 600 mg tablet Take 600 mg by mouth every six (6) hours as needed for Pain.  allopurinoL (ZYLOPRIM) 100 mg tablet Take 100 mg by mouth daily. Switched from [facility] to allopurinol on 7/17/2020      No current facility-administered medications for this visit.         I have reviewed the nurses notes, vitals, problem list, allergy list, medical history, family, social history and medications.         REVIEW OF SYMPTOMS      General: Pt denies excessive weight gain or loss. Pt is able to conduct ADL's  HEENT: Denies blurred vision, headaches, hearing loss, epistaxis and difficulty swallowing. Respiratory: Denies cough, congestion, shortness of breath, HERRERA, wheezing or stridor.   Cardiovascular: Denies precordial pain, palpitations, edema or PND  Gastrointestinal: Denies poor appetite, indigestion, abdominal pain or blood in stool  Genitourinary: Denies hematuria, dysuria, increased urinary frequency  Musculoskeletal: Denies joint pain or swelling from muscles or joints  Neurologic: Denies tremor, paresthesias, headache, or sensory motor disturbance  Psychiatric: Denies confusion, insomnia, depression  Integumentray: Denies rash, itching or ulcers. Hematologic: Denies easy bruising, bleeding         PHYSICAL EXAMINATION      Vitals: see vitals section  General: Well developed, in no acute distress. HEENT: No jaundice, oral mucosa moist, no oral ulcers  Neck: Supple, no stiffness, no lymphadenopathy, supple  Heart:  irreg irreg, no murmur, gallop or rub, no jugular venous distention  Respiratory: Clear bilaterally x 4, no wheezing or rales  Abdomen:   Soft, non-tender, bowel sounds are active.   Extremities:  No edema, normal cap refill, no cyanosis. Musculoskeletal: No clubbing, no deformities  Neuro: A&Ox3, speech clear, gait stable, cooperative, no focal neurologic deficits  Skin: Skin color is normal. No rashes or lesions.  Non diaphoretic, moist.  Vascular: 2+ pulses symmetric in all extremities         DIAGNOSTIC DATA      EKG: AF with RVR         LABORATORY DATA            Lab Results   Component Value Date/Time     WBC 7.8 01/07/2021 07:52 AM     HGB 15.0 01/07/2021 07:52 AM     HCT 46.8 01/07/2021 07:52 AM     PLATELET 688 62/28/6512 07:52 AM     MCV 82.0 01/07/2021 07:52 AM            Lab Results   Component Value Date/Time     Sodium 139 01/07/2021 07:52 AM     Potassium 4.0 01/07/2021 07:52 AM     Chloride 108 01/07/2021 07:52 AM     CO2 27 01/07/2021 07:52 AM     Anion gap 4 (L) 01/07/2021 07:52 AM     Glucose 121 (H) 01/07/2021 07:52 AM     BUN 11 01/07/2021 07:52 AM     Creatinine 1.16 01/07/2021 07:52 AM     BUN/Creatinine ratio 9 (L) 01/07/2021 07:52 AM     GFR est AA >60 01/07/2021 07:52 AM     GFR est non-AA >60 01/07/2021 07:52 AM     Calcium 9.4 01/07/2021 07:52 AM   Bilirubin, total 1.1 (H) 01/07/2021 07:52 AM     Alk. phosphatase 94 01/07/2021 07:52 AM     Protein, total 7.5 01/07/2021 07:52 AM     Albumin 4.0 01/07/2021 07:52 AM     Globulin 3.5 01/07/2021 07:52 AM     A-G Ratio 1.1 01/07/2021 07:52 AM     ALT (SGPT) 49 01/07/2021 07:52 AM             ASSESSMENT      1. Atrial flutter              A. CTI ablation x 2             B. Right lateral atypical atrial flutter s/p ablation x2  2. Atrial fibrillation  3. PVCs  4.  Right atrial scar            PLAN      AF ablation

## 2021-07-06 NOTE — PROCEDURES
Successful AF ablation performed of persistent atrial fibrillation with PVI using 3d mapping, LA pacing/recording, ICE imaging  (DCCV performed prior to ablation to restore sinus rhythm)  CTI block confirmed    Plan:  Continue diltiazem for PACs  Continue eliquis

## 2021-07-06 NOTE — Clinical Note
Sheath: removed. Hemostasis achieved. Upon evaluation of the common femoral artery stick using fluoroscopy, the access site puncture was within the safe zone.

## 2021-07-06 NOTE — Clinical Note
under hemodynamic and ICE and Fluoro, utilizing a standard needle, Sudha 98cm via a guiding sheath. Needle inserted.

## 2021-07-06 NOTE — ANESTHESIA PREPROCEDURE EVALUATION
Relevant Problems   CARDIOVASCULAR   (+) A-fib (HCC)   (+) Atrial flutter (HCC)   (+) High blood pressure      ENDOCRINE   (+) Obesity       Anesthetic History   No history of anesthetic complications            Review of Systems / Medical History  Patient summary reviewed, nursing notes reviewed and pertinent labs reviewed    Pulmonary  Within defined limits                 Neuro/Psych   Within defined limits           Cardiovascular  Within defined limits  Hypertension: well controlled        Dysrhythmias : SVT and atrial flutter      Exercise tolerance: >4 METS     GI/Hepatic/Renal  Within defined limits              Endo/Other        Obesity     Other Findings   Comments: Atrial flutter (HCC)       s/p ablation    Gout     High blood pressure     Prediabetes     Prostate cancer (HCC)                Physical Exam    Airway  Mallampati: II  TM Distance: > 6 cm  Neck ROM: decreased range of motion   Mouth opening: Normal     Cardiovascular    Rhythm: irregular      Murmur: Grade 2, Mitral area     Dental  No notable dental hx       Pulmonary  Breath sounds clear to auscultation               Abdominal  GI exam deferred       Other Findings            Anesthetic Plan    ASA: 3  Anesthesia type: MAC and general - backup          Induction: Intravenous  Anesthetic plan and risks discussed with: Patient

## 2021-07-19 ENCOUNTER — OFFICE VISIT (OUTPATIENT)
Dept: CARDIOLOGY CLINIC | Age: 66
End: 2021-07-19
Payer: COMMERCIAL

## 2021-07-19 VITALS
SYSTOLIC BLOOD PRESSURE: 156 MMHG | WEIGHT: 258.2 LBS | DIASTOLIC BLOOD PRESSURE: 82 MMHG | OXYGEN SATURATION: 98 % | HEIGHT: 74 IN | HEART RATE: 81 BPM | RESPIRATION RATE: 18 BRPM | BODY MASS INDEX: 33.14 KG/M2

## 2021-07-19 DIAGNOSIS — I48.92 ATRIAL FLUTTER, UNSPECIFIED TYPE (HCC): ICD-10-CM

## 2021-07-19 DIAGNOSIS — I48.4 ATYPICAL ATRIAL FLUTTER (HCC): ICD-10-CM

## 2021-07-19 DIAGNOSIS — I48.0 AF (PAROXYSMAL ATRIAL FIBRILLATION) (HCC): Primary | ICD-10-CM

## 2021-07-19 DIAGNOSIS — I48.91 ATRIAL FIBRILLATION, UNSPECIFIED TYPE (HCC): ICD-10-CM

## 2021-07-19 PROCEDURE — 99214 OFFICE O/P EST MOD 30 MIN: CPT | Performed by: NURSE PRACTITIONER

## 2021-07-19 PROCEDURE — 93000 ELECTROCARDIOGRAM COMPLETE: CPT | Performed by: NURSE PRACTITIONER

## 2021-07-19 RX ORDER — DILTIAZEM HYDROCHLORIDE 120 MG/1
120 CAPSULE, EXTENDED RELEASE ORAL DAILY
Qty: 30 CAPSULE | Refills: 1 | Status: SHIPPED | OUTPATIENT
Start: 2021-07-19 | End: 2021-09-01

## 2021-07-19 NOTE — PROGRESS NOTES
HISTORY OF PRESENT ILLNESS      Joaquim Peterson is a 61 y.o. male who underwent AFL ablation in the past with subsequent recurrence and repeat CTI ablation last month whose monitor shows recurrent AFL (as well as PVCs). He is very symptomatic with HERRERA. Has been compliant with medications including anticoagulation since his procedure. He then underwent repeat atypical/ right atrial flutter ablation (An area of slow conduction mid-RA was identified with delayed/fractionated potentials where ablation was performed and resulted in termination of the tachycardia nearly immediately following onset of RF energy. Bidirectional conduction block was demonstrated across the CTI). Repeat monitor report with atypical atrial flutter and PVCs. He was  asymptomatic- feeling improvement since his procedure. Instructed him to restart his Diltiazem 120 mg daily and underwent repeat atypical flutter ablation. He again had recurrence of AF with RVR and had successful AF ablation on 7/6/2021 and has maintained NSR since procedure. Diltiazem and Eliquis were continued. EKG shows sinus rhythm with PVCs.           PAST MEDICAL HISTORY     Past Medical History:   Diagnosis Date    Abnormal echocardiogram 10/28/2010    Atrial flutter (HCC)     s/p ablation     Dyslipidemia     Gout     High blood pressure     High blood pressure     Obesity 10/28/2010    Prediabetes     [facility] [address]     S/P ablation of atrial flutter     Supraventricular tachycardia ([facility] [address] 10/28/2010           PAST SURGICAL HISTORY     Past Surgical History:   Procedure Laterality Date    HX ACL RECONSTRUCTION  6/2013    menniscus    HX ROTATOR CUFF REPAIR Left 2005    HX ROTATOR CUFF REPAIR Right 2007    HX WISDOM TEETH EXTRACTION      INTRACARD ECHO, THER/DX INTERVENT N/A 1/7/2021    Intracardiac Echocardiogram performed by Genoveva Shepherd MD at [place] [address], [facility] [address] CATH LAB    [place] CARDIAC SURG PROCEDURE UNLIST  2016    cardiac ablation  [place] COMPRE ELECTROPHYSIOL XM W/LEFT ATRIAL PACNG/REC N/A 12/22/2020    Lt Atrial Pace & Record During Ep Study performed by Claudio Collins MD at [place] [address], [facility] [address] CATH LAB    NV COMPRE ELECTROPHYSIOL XM W/LEFT ATRIAL PACNG/REC N/A 1/26/2021    Lt Atrial Pace & Record During Ep Study performed by Claudio Collins MD at [place] [address], [facility] CATH LAB    NV EPHYS EVAL W/ABLATION [facility] ARRHYTHMIA N/A 12/22/2020    ABLATION SVT performed by Claudio Collins MD at [place] [address], [facility] [address] CATH LAB    NV EPHYS EVAL W/ABLATION SUPRAVENT ARRHYTHMIA N/A 1/7/2021    Ablation A-Flutter performed by Claudio Collins MD at [place] [address], [facility] [address] CATH LAB    NV EPHYS EVAL W/ABLATION SUPRAVENT ARRHYTHMIA N/A 1/26/2021    Ablation A-Flutter performed by Claudio Collins MD at [place] [address], [facility] [address] CATH LAB    NV INTRACARDIAC ELECTROPHYSIOLOGIC 3D MAPPING N/A 12/22/2020    Ep 3d Mapping performed by Claudio Collins MD at [place] [address], [facility] CATH LAB    NV INTRACARDIAC ELECTROPHYSIOLOGIC 3D MAPPING N/A 1/7/2021    Ep 3d Mapping performed by Claudio Collins MD at [place] [address], [facility] [address] CATH LAB    NV INTRACARDIAC ELECTROPHYSIOLOGIC 3D MAPPING N/A 1/26/2021    Ep 3d Mapping performed by Claudio Collins MD at [place] [address], [facility] [address] CATH LAB          ALLERGIES     Allergies   Allergen Reactions    Percocet [Oxycodone-Acetaminophen] Itching     Pt has used hydrocodone          FAMILY HISTORY     Family History   Problem Relation Age of Onset    No Known Problems Mother     No Known Problems Father     Cancer Sister 43        breast    No Known Problems Sister     negative for cardiac disease       SOCIAL HISTORY     Social History     Socioeconomic History    Marital status: SINGLE     Spouse name: Not on file    Number of children: Not on file    Years of education: Not on file    Highest education level: Not on file   Tobacco Use    Smoking status: Never Smoker    Smokeless tobacco: Never Used   Substance and Sexual Activity    Alcohol use:  Yes     Alcohol/week: 12.0 standard drinks     Types: 12 Cans of beer per week     Comment: reports not drinking during the week, only on weekends    Drug use: No     Social Determinants of Health     Financial Resource Strain:     Difficulty of Paying Living Expenses:    Food Insecurity:     Worried About Running Out of Food in the Last Year:     [address] in the Last Year:    Transportation Needs:     Lack of Transportation (Medical):  Lack of Transportation (Non-Medical):    Physical Activity:     Days of Exercise per Week:     Minutes of Exercise per Session:    Stress:     Feeling of Stress :    Social Connections:     Frequency of Communication with Friends and Family:     Frequency of Social Gatherings with Friends and Family:     Attends Sikhism Services:     Active Member of Clubs or Organizations:     Attends Club or Organization Meetings:     Marital Status:          MEDICATIONS     Current Outpatient Medications   Medication Sig    dilTIAZem ER (DILACOR XR) 120 mg capsule Take 1 Capsule by mouth daily.  pantoprazole (PROTONIX) 40 mg tablet Take 1 Tablet by mouth daily.  apixaban (Eliquis) 5 mg tablet Take 1 Tab by mouth two (2) times a day.  allopurinoL (ZYLOPRIM) 100 mg tablet Take 100 mg by mouth daily. Switched from [place] to allopurinol on 7/17/2020     No current facility-administered medications for this visit. I have reviewed the nurses notes, vitals, problem list, allergy list, medical history, family, social history and medications. REVIEW OF SYMPTOMS      General: Pt denies excessive weight gain or loss. Pt is able to conduct ADL's  HEENT: Denies blurred vision, headaches, hearing loss, epistaxis and difficulty swallowing. Respiratory: Denies cough, congestion, shortness of breath, HERRERA, wheezing or stridor.   Cardiovascular: Denies precordial pain, palpitations, edema or PND  Gastrointestinal: Denies poor appetite, indigestion, abdominal pain or blood in stool  Genitourinary: Denies hematuria, dysuria, increased urinary frequency  Musculoskeletal: Denies joint pain or swelling from muscles or joints  Neurologic: Denies tremor, paresthesias, headache, or sensory motor disturbance  Psychiatric: Denies confusion, insomnia, depression  Integumentray: Denies rash, itching or ulcers. Hematologic: Denies easy bruising, bleeding       PHYSICAL EXAMINATION      Vitals: see vitals section  General: Well developed, in no acute distress. HEENT: No jaundice, oral mucosa moist, no oral ulcers  Neck: Supple, no stiffness, no lymphadenopathy, supple  Heart:  Normal S1/S2 negative S3 or S4. Regular, no murmur, gallop or rub, no jugular venous distention  Respiratory: Clear bilaterally x 4, no wheezing or rales  Abdomen:   Soft, non-tender, bowel sounds are active. Extremities:  No edema, normal cap refill, no cyanosis. Musculoskeletal: No clubbing, no deformities  Neuro: A&Ox3, speech clear, gait stable, cooperative, no focal neurologic deficits  Skin: Skin color is normal. No rashes or lesions.  Non diaphoretic, moist.  Vascular: 2+ pulses symmetric in all extremities       DIAGNOSTIC DATA      EKG: sinus rhythm with PVCs    Visit Vitals  BP (!) 156/82 (BP 1 Location: Left upper arm, BP Patient Position: Sitting, BP Cuff Size: Adult)   Pulse 81   Resp 18   Ht 6' 2\" (1.88 m)   Wt 258 lb 3.2 oz (117.1 kg)   SpO2 98%   BMI 33.15 kg/m²          LABORATORY DATA      Lab Results   Component Value Date/Time    WBC 7.3 06/30/2021 03:34 PM    HGB 15.1 06/30/2021 03:34 PM    HCT 46.1 06/30/2021 03:34 PM    PLATELET 261 64/29/4266 03:34 PM    MCV 84 06/30/2021 03:34 PM      Lab Results   Component Value Date/Time    Sodium 141 06/30/2021 03:34 PM    Potassium 4.3 06/30/2021 03:34 PM    Chloride 103 06/30/2021 03:34 PM    CO2 26 06/30/2021 03:34 PM    Anion gap 5 06/02/2021 11:26 AM    Glucose 118 (H) 06/30/2021 03:34 PM    BUN 12 06/30/2021 03:34 PM    Creatinine 0.87 06/30/2021 03:34 PM    BUN/Creatinine ratio 14 06/30/2021 03:34 PM GFR est  06/30/2021 03:34 PM    GFR est non-AA 91 06/30/2021 03:34 PM    Calcium 9.6 06/30/2021 03:34 PM    Bilirubin, total 0.6 06/30/2021 03:34 PM    Alk. phosphatase 58 06/30/2021 03:34 PM    Protein, total 6.9 06/30/2021 03:34 PM    Albumin 4.4 06/30/2021 03:34 PM    Globulin 3.5 01/07/2021 07:52 AM    A-G Ratio 1.8 06/30/2021 03:34 PM    ALT (SGPT) 16 06/30/2021 03:34 PM         ASSESSMENT/PLAN         1. Atrial flutter              A. CTI ablation x 2             B. Right lateral atypical atrial flutter s/p ablation x2   C. PVI 7/6/2021  2. Atrial fibrillation  3. PVCs  4. Right atrial scar    Continue current medical therapy and follow up in 3 months. FOLLOW UP      3 months with Dr. Shahana Perez    Thank you, Twilla Phoenix, MD for allowing me to participate in the care of this extraordinarily pleasant male. Please do not hesitate to contact me for further questions/concerns.      Ashley Baez NP    [facility] 92.  [address] [facility], [address], [facility] [place] [address]    [place]  [phone] / [phone] Fax   [phone] / [phone] Fax

## 2021-07-19 NOTE — LETTER
7/19/2021    Patient: Verner Everts   YOB: 1955   Date of Visit: 7/19/2021     [phone]  Via Fax: [phone]    Dear Agustin Rogers MD,      Thank you for referring Mr. Braeden Lynne to [address]  for evaluation. My notes for this consultation are attached. If you have questions, please do not hesitate to call me. I look forward to following your patient along with you.       Sincerely,    Irvin Souza NP

## 2021-07-19 NOTE — PROGRESS NOTES
Room #: 3    C/o occasional feelings in his chest - not palpitations - describes as a slight squeeze or irregularity. Would like to talk about coming off of Eliquis or Diltiazem soon. Visit Vitals  BP (!) 156/82 (BP 1 Location: Left upper arm, BP Patient Position: Sitting, BP Cuff Size: Adult)   Pulse 81   Resp 18   Ht 6' 2\" (1.88 m)   Wt 258 lb 3.2 oz (117.1 kg)   SpO2 98%   BMI 33.15 kg/m²         Chest pain:  NO  Shortness of breath:  NO  Edema: NO  Palpitations, skipped beats, rapid heartbeat:  NO  Dizziness:  NO    1. Have you been to the ER, urgent care clinic since your last visit? Hospitalized since your last visit? No    2. Have you seen or consulted any other health care providers outside of the [address] [place] [address] since your last visit? Include any pap smears or colon screening. No      Refills:  YES - Needs a paper script for Diltiazem. Took his last dose yesterday.

## 2021-08-05 ENCOUNTER — TELEPHONE (OUTPATIENT)
Dept: CARDIOLOGY CLINIC | Age: 66
End: 2021-08-05

## 2021-08-05 ENCOUNTER — APPOINTMENT (OUTPATIENT)
Dept: CARDIAC CATH/INVASIVE PROCEDURES | Age: 66
End: 2021-08-05
Attending: INTERNAL MEDICINE
Payer: COMMERCIAL

## 2021-08-05 NOTE — TELEPHONE ENCOUNTER
Patient called because as of today he is completely out of his Eliquis and needs a refill. Pharmacy verified.       Phone [phone]

## 2021-08-05 NOTE — TELEPHONE ENCOUNTER
Requested Prescriptions     Signed Prescriptions Disp Refills    apixaban (Eliquis) 5 mg tablet 60 Tablet 3     Sig: Take 1 Tablet by mouth two (2) times a day. Authorizing Provider: Mikal Powers     Ordering User: Anitasa Micah CULLEN     Refills per verbal order from Dr. Angelo Orellana. Returned patient call, ID verified using two patient identifiers. Notified patient that I sent refills for his Eliquis to his pharmacy on file. Patient states he took his last pill this morning and will go  his prescription today.

## 2021-08-16 ENCOUNTER — TELEPHONE (OUTPATIENT)
Dept: CARDIOLOGY CLINIC | Age: 66
End: 2021-08-16

## 2021-08-16 NOTE — TELEPHONE ENCOUNTER
Patient called and stated that he would like to be taken off of his medications by the end of August.Please give patient a call back.       Phone [phone]

## 2021-08-19 NOTE — TELEPHONE ENCOUNTER
Lexi Lamb, NP  You 3 days ago   LP  I don't recommend that he stop his medicines until his 3 month follow up with Dr. Angelo Orellana in order to avoid recurrence/ increased stroke risk. He's quite reasonable and has had persistent AFib, I think this may not be the whole story. .      Returned call, no answer. LM to have call returned to [phone]. Need to advise patient of above information.

## 2021-08-19 NOTE — TELEPHONE ENCOUNTER
Melina Link, NP  You 3 days ago   LP  I don't recommend that he stop his medicines until his 3 month follow up with Dr. Addison Noble in order to avoid recurrence/ increased stroke risk. Called patient, ID verified using two patient identifiers. Informed patient of above recommendations per JESSICA Saldivar NP. Patient verbalizes understanding but states that he doesn't like the way the medications make him feel. States that he\"is not feeling like himself\",gets winded while doing his daily work activities(lifting heavy objects, stairs and walking a lot). Denies any CP, dizziness or palpitations. Also states he has a physical at the end of September and they are going to ask him what meds he is on and he doesn't want to have to list the Diltiazem and Eliquis. Reiterated to patient about risk of stroke and reoccurrence of Afib if he stops his medications without a Dr's. order. Patient verbalizes understanding.

## 2021-09-01 ENCOUNTER — OFFICE VISIT (OUTPATIENT)
Dept: CARDIOLOGY CLINIC | Age: 66
End: 2021-09-01
Payer: COMMERCIAL

## 2021-09-01 VITALS
OXYGEN SATURATION: 97 % | HEART RATE: 125 BPM | DIASTOLIC BLOOD PRESSURE: 88 MMHG | WEIGHT: 256.4 LBS | BODY MASS INDEX: 32.91 KG/M2 | SYSTOLIC BLOOD PRESSURE: 134 MMHG | HEIGHT: 74 IN | RESPIRATION RATE: 20 BRPM

## 2021-09-01 DIAGNOSIS — I48.4 ATYPICAL ATRIAL FLUTTER (HCC): ICD-10-CM

## 2021-09-01 DIAGNOSIS — I48.91 ATRIAL FIBRILLATION, UNSPECIFIED TYPE (HCC): ICD-10-CM

## 2021-09-01 DIAGNOSIS — I48.92 ATRIAL FLUTTER, UNSPECIFIED TYPE (HCC): ICD-10-CM

## 2021-09-01 DIAGNOSIS — Z01.812 PRE-PROCEDURE LAB EXAM: ICD-10-CM

## 2021-09-01 DIAGNOSIS — I48.0 AF (PAROXYSMAL ATRIAL FIBRILLATION) (HCC): Primary | ICD-10-CM

## 2021-09-01 PROCEDURE — 93000 ELECTROCARDIOGRAM COMPLETE: CPT | Performed by: INTERNAL MEDICINE

## 2021-09-01 PROCEDURE — 99215 OFFICE O/P EST HI 40 MIN: CPT | Performed by: INTERNAL MEDICINE

## 2021-09-01 RX ORDER — DILTIAZEM HYDROCHLORIDE 240 MG/1
240 CAPSULE, EXTENDED RELEASE ORAL DAILY
Qty: 30 CAPSULE | Refills: 3 | Status: SHIPPED | OUTPATIENT
Start: 2021-09-01 | End: 2022-01-03 | Stop reason: SDUPTHER

## 2021-09-01 NOTE — PROGRESS NOTES
HISTORY OF PRESENT ILLNESS      Mahendra Garcia is a 61 y.o. male who underwent AFL ablation in the past with subsequent recurrence and repeat CTI ablation last month whose monitor shows recurrent AFL (as well as PVCs). He is very symptomatic with HERRERA. Has been compliant with medications including anticoagulation since his procedure. He then underwent repeat atypical/ right atrial flutter ablation (An area of slow conduction mid-RA was identified with delayed/fractionated potentials where ablation was performed and resulted in termination of the tachycardia nearly immediately following onset of RF energy. Bidirectional conduction block was demonstrated across the CTI). Repeat monitor report with atypical atrial flutter and PVCs. He was  asymptomatic- feeling improvement since his procedure. Instructed him to restart his Diltiazem 120 mg daily and underwent repeat atypical flutter ablation. He again had recurrence of AF with RVR and had successful AF ablation on 7/6/2021 and has maintained NSR since procedure. Diltiazem and Eliquis were continued. EKG shows sinus rhythm with PVCs. Had recurrent palps and ekg shows AF today.           PAST MEDICAL HISTORY     Past Medical History:   Diagnosis Date    Abnormal echocardiogram 10/28/2010    Atrial flutter (HCC)     s/p ablation     Dyslipidemia     Gout     High blood pressure     High blood pressure     Obesity 10/28/2010    Prediabetes     Prostate cancer ([facility] [address]     S/P ablation of atrial flutter     Supraventricular tachycardia ([facility] [address] 10/28/2010           PAST SURGICAL HISTORY     Past Surgical History:   Procedure Laterality Date    HX ACL RECONSTRUCTION  6/2013    menniscus    HX ROTATOR CUFF REPAIR Left 2005    HX ROTATOR CUFF REPAIR Right 2007    HX WISDOM TEETH EXTRACTION      INTRACARD ECHO, THER/DX INTERVENT N/A 1/7/2021    Intracardiac Echocardiogram performed by Hilario Rosenberg MD at [facility] SURG PROCEDURE UNLIST  2016    cardiac ablation    [place] COMPRE ELECTROPHYSIOL XM W/LEFT ATRIAL PACNG/REC N/A 12/22/2020    Lt Atrial Pace & Record During Ep Study performed by Mainor Chapa MD at [place] [address], [facility] CATH LAB    [place] COMPRE ELECTROPHYSIOL XM W/LEFT ATRIAL PACNG/REC N/A 1/26/2021    Lt Atrial Pace & Record During Ep Study performed by Mainor Chapa MD at [place] [address], [facility] [address] CATH LAB    [place] EPHYS EVAL W/ABLATION [facility] ARRHYTHMIA N/A 12/22/2020    ABLATION SVT performed by Mainor Chapa MD at [place] [address], [facility] [address] CATH LAB    KS EPHYS EVAL W/ABLATION SUPRAVENT ARRHYTHMIA N/A 1/7/2021    Ablation A-Flutter performed by Mainor Chapa MD at [place] [address], [facility] [address] CATH LAB    KS EPHYS EVAL W/ABLATION SUPRAVENT ARRHYTHMIA N/A 1/26/2021    Ablation A-Flutter performed by Mainor Chapa MD at [place] [address], [facility] [address] CATH LAB    [place] INTRACARDIAC ELECTROPHYSIOLOGIC 3D MAPPING N/A 12/22/2020    Ep 3d Mapping performed by Mainor Chapa MD at [place] [address], [facility] [address] CATH LAB    [place] INTRACARDIAC ELECTROPHYSIOLOGIC 3D MAPPING N/A 1/7/2021    Ep 3d Mapping performed by Mainor Chapa MD at [place] [address], [facility] [address] CATH LAB    KS INTRACARDIAC ELECTROPHYSIOLOGIC 3D MAPPING N/A 1/26/2021    Ep 3d Mapping performed by Mainor Chapa MD at [place] [address], [facility] [address] CATH LAB          ALLERGIES     Allergies   Allergen Reactions    Percocet [Oxycodone-Acetaminophen] Itching     Pt has used hydrocodone          FAMILY HISTORY     Family History   Problem Relation Age of Onset    No Known Problems Mother     No Known Problems Father     Cancer Sister 43        breast    No Known Problems Sister     negative for cardiac disease       SOCIAL HISTORY     Social History     Socioeconomic History    Marital status: SINGLE     Spouse name: Not on file    Number of children: Not on file    Years of education: Not on file    Highest education level: Not on file   Tobacco Use    Smoking status: Never Smoker    Smokeless tobacco: Never Used   Substance and Sexual Activity    Alcohol use:  Yes Alcohol/week: 12.0 standard drinks     Types: 12 Cans of beer per week     Comment: reports not drinking during the week, only on weekends    Drug use: No     Social Determinants of Health     Financial Resource Strain:     Difficulty of Paying Living Expenses:    Food Insecurity:     Worried About Running Out of Food in the Last Year:     [address] in the Last Year:    Transportation Needs:     Lack of Transportation (Medical):  Lack of Transportation (Non-Medical):    Physical Activity:     Days of Exercise per Week:     Minutes of Exercise per Session:    Stress:     Feeling of Stress :    Social Connections:     Frequency of Communication with Friends and Family:     Frequency of Social Gatherings with Friends and Family:     Attends Church Services:     Active Member of Clubs or Organizations:     Attends Club or Organization Meetings:     Marital Status:          MEDICATIONS     Current Outpatient Medications   Medication Sig    dilTIAZem ER (DILACOR XR) 240 mg capsule Take 1 Capsule by mouth daily.  apixaban (Eliquis) 5 mg tablet Take 1 Tablet by mouth two (2) times a day.  pantoprazole (PROTONIX) 40 mg tablet Take 1 Tablet by mouth daily.  allopurinoL (ZYLOPRIM) 100 mg tablet Take 100 mg by mouth daily. Switched from [place] to allopurinol on 7/17/2020     No current facility-administered medications for this visit. I have reviewed the nurses notes, vitals, problem list, allergy list, medical history, family, social history and medications. REVIEW OF SYMPTOMS      General: Pt denies excessive weight gain or loss. Pt is able to conduct ADL's  HEENT: Denies blurred vision, headaches, hearing loss, epistaxis and difficulty swallowing. Respiratory: Denies cough, congestion, shortness of breath, HERRERA, wheezing or stridor.   Cardiovascular: Denies precordial pain, palpitations, edema or PND  Gastrointestinal: Denies poor appetite, indigestion, abdominal pain or blood in stool  Genitourinary: Denies hematuria, dysuria, increased urinary frequency  Musculoskeletal: Denies joint pain or swelling from muscles or joints  Neurologic: Denies tremor, paresthesias, headache, or sensory motor disturbance  Psychiatric: Denies confusion, insomnia, depression  Integumentray: Denies rash, itching or ulcers. Hematologic: Denies easy bruising, bleeding       PHYSICAL EXAMINATION      Vitals: see vitals section  General: Well developed, in no acute distress. HEENT: No jaundice, oral mucosa moist, no oral ulcers  Neck: Supple, no stiffness, no lymphadenopathy, supple  Heart:  irreg irreg, no murmur, gallop or rub, no jugular venous distention  Respiratory: Clear bilaterally x 4, no wheezing or rales  Abdomen:   Soft, non-tender, bowel sounds are active. Extremities:  No edema, normal cap refill, no cyanosis. Musculoskeletal: No clubbing, no deformities  Neuro: A&Ox3, speech clear, gait stable, cooperative, no focal neurologic deficits  Skin: Skin color is normal. No rashes or lesions.  Non diaphoretic, moist.  Vascular: 2+ pulses symmetric in all extremities       DIAGNOSTIC DATA      EKG: AF    Visit Vitals  /88 (BP 1 Location: Left upper arm, BP Patient Position: Sitting, BP Cuff Size: Large adult)   Pulse (!) 125   Resp 20   Ht 6' 2\" (1.88 m)   Wt 256 lb 6.4 oz (116.3 kg)   SpO2 97%   BMI 32.92 kg/m²          LABORATORY DATA      Lab Results   Component Value Date/Time    WBC 7.3 06/30/2021 03:34 PM    HGB 15.1 06/30/2021 03:34 PM    HCT 46.1 06/30/2021 03:34 PM    PLATELET 770 91/51/8535 03:34 PM    MCV 84 06/30/2021 03:34 PM      Lab Results   Component Value Date/Time    Sodium 141 06/30/2021 03:34 PM    Potassium 4.3 06/30/2021 03:34 PM    Chloride 103 06/30/2021 03:34 PM    CO2 26 06/30/2021 03:34 PM    Anion gap 5 06/02/2021 11:26 AM    Glucose 118 (H) 06/30/2021 03:34 PM    BUN 12 06/30/2021 03:34 PM    Creatinine 0.87 06/30/2021 03:34 PM    BUN/Creatinine ratio 14 06/30/2021 03:34 PM    GFR est  06/30/2021 03:34 PM    GFR est non-AA 91 06/30/2021 03:34 PM    Calcium 9.6 06/30/2021 03:34 PM    Bilirubin, total 0.6 06/30/2021 03:34 PM    Alk. phosphatase 58 06/30/2021 03:34 PM    Protein, total 6.9 06/30/2021 03:34 PM    Albumin 4.4 06/30/2021 03:34 PM    Globulin 3.5 01/07/2021 07:52 AM    A-G Ratio 1.8 06/30/2021 03:34 PM    ALT (SGPT) 16 06/30/2021 03:34 PM         ASSESSMENT/PLAN         1. Atrial flutter              A. CTI ablation x 2             B. Right lateral atypical atrial flutter s/p ablation x2   C. PVI 7/6/2021  2. Atrial fibrillation  3. PVCs  4. Right atrial scar    Increase dilt to 240 mg daily and plan for bria/dccv to maintain SR during healing phase with hope to taper meds back in future vs repeat AF ablation. FOLLOW UP       Thank you, Lokesh Muhammad MD for allowing me to participate in the care of this extraordinarily pleasant male. Please do not hesitate to contact me for further questions/concerns.      Sushil Rodgers MD    [facility] 92.  [facility], [address], [facility] [address] Monica Paulino.    [facility], [address]  [phone] / [phone] Fax   [phone] / [phone] Fax

## 2021-09-01 NOTE — PROGRESS NOTES
Addendum:  Patient scheduled for a FREDRICK/DCCV on Friday 9/10/21 at 10:00 am.  Reviewed pre procedure instructions with patient. Covid swab order placed. Room #: 4    Had an episode last weekend of rapid heart rates. Took an extra 120mg of Diltiazem. Visit Vitals  /88 (BP 1 Location: Left upper arm, BP Patient Position: Sitting, BP Cuff Size: Large adult)   Pulse (!) 125   Resp 20   Ht 6' 2\" (1.88 m)   Wt 256 lb 6.4 oz (116.3 kg)   SpO2 97%   BMI 32.92 kg/m²         Chest pain:  NO  Shortness of breath:  YES  Edema: NO  Palpitations, skipped beats, rapid heartbeat:  YES  Dizziness:  YES    1. Have you been to the ER, urgent care clinic since your last visit? Hospitalized since your last visit? No    2. Have you seen or consulted any other health care providers outside of the [address] [place] [address] since your last visit? Include any pap smears or colon screening.  No      Refills:  NO

## 2021-09-07 ENCOUNTER — PREP FOR PROCEDURE (OUTPATIENT)
Dept: CARDIOLOGY CLINIC | Age: 66
End: 2021-09-07

## 2021-09-07 RX ORDER — SODIUM CHLORIDE 0.9 % (FLUSH) 0.9 %
5-40 SYRINGE (ML) INJECTION EVERY 8 HOURS
Status: CANCELLED | OUTPATIENT
Start: 2021-09-07

## 2021-09-07 RX ORDER — SODIUM CHLORIDE 0.9 % (FLUSH) 0.9 %
5-40 SYRINGE (ML) INJECTION AS NEEDED
Status: CANCELLED | OUTPATIENT
Start: 2021-09-07

## 2021-09-09 ENCOUNTER — HOSPITAL ENCOUNTER (OUTPATIENT)
Dept: LAB | Age: 66
Discharge: HOME | End: 2021-09-09
Payer: COMMERCIAL

## 2021-09-09 ENCOUNTER — TRANSCRIBE ORDER (OUTPATIENT)
Dept: REGISTRATION | Age: 66
End: 2021-09-09

## 2021-09-09 DIAGNOSIS — Z01.812 PRE-PROCEDURAL LABORATORY EXAMINATIONS: ICD-10-CM

## 2021-09-09 DIAGNOSIS — Z01.812 PRE-PROCEDURAL LABORATORY EXAMINATIONS: Primary | ICD-10-CM

## 2021-09-09 PROCEDURE — U0005 INFEC AGEN DETEC AMPLI PROBE: HCPCS

## 2021-09-10 LAB
SARS-COV-2, XPLCVT: NOT DETECTED
SOURCE, COVRS: NORMAL

## 2021-09-13 ENCOUNTER — HOSPITAL ENCOUNTER (OUTPATIENT)
Dept: CARDIAC CATH/INVASIVE PROCEDURES | Age: 66
Discharge: HOME | End: 2021-09-13
Attending: INTERNAL MEDICINE | Admitting: INTERNAL MEDICINE
Payer: COMMERCIAL

## 2021-09-13 VITALS
HEART RATE: 91 BPM | SYSTOLIC BLOOD PRESSURE: 108 MMHG | TEMPERATURE: 98 F | HEIGHT: 74 IN | RESPIRATION RATE: 12 BRPM | OXYGEN SATURATION: 92 % | WEIGHT: 250.6 LBS | BODY MASS INDEX: 32.16 KG/M2 | DIASTOLIC BLOOD PRESSURE: 72 MMHG

## 2021-09-13 DIAGNOSIS — I48.0 AF (PAROXYSMAL ATRIAL FIBRILLATION) (HCC): ICD-10-CM

## 2021-09-13 LAB
ANION GAP SERPL CALC-SCNC: 3 MMOL/L (ref 5–15)
BUN SERPL-MCNC: 10 MG/DL (ref 6–20)
BUN/CREAT SERPL: 10 (ref 12–20)
CALCIUM SERPL-MCNC: 8.6 MG/DL (ref 8.5–10.1)
CHLORIDE SERPL-SCNC: 108 MMOL/L (ref 97–108)
CO2 SERPL-SCNC: 27 MMOL/L (ref 21–32)
CREAT SERPL-MCNC: 0.98 MG/DL (ref 0.7–1.3)
ERYTHROCYTE [DISTWIDTH] IN BLOOD BY AUTOMATED COUNT: 15.4 % (ref 11.5–14.5)
GLUCOSE SERPL-MCNC: 123 MG/DL (ref 65–100)
HCT VFR BLD AUTO: 49.8 % (ref 36.6–50.3)
HGB BLD-MCNC: 16.2 G/DL (ref 12.1–17)
MAGNESIUM SERPL-MCNC: 2.2 MG/DL (ref 1.6–2.4)
MCH RBC QN AUTO: 26.8 PG (ref 26–34)
MCHC RBC AUTO-ENTMCNC: 32.5 G/DL (ref 30–36.5)
MCV RBC AUTO: 82.3 FL (ref 80–99)
NRBC # BLD: 0 K/UL (ref 0–0.01)
NRBC BLD-RTO: 0 PER 100 WBC
PLATELET # BLD AUTO: 180 K/UL (ref 150–400)
PMV BLD AUTO: 9.3 FL (ref 8.9–12.9)
POTASSIUM SERPL-SCNC: 4 MMOL/L (ref 3.5–5.1)
RBC # BLD AUTO: 6.05 M/UL (ref 4.1–5.7)
SODIUM SERPL-SCNC: 138 MMOL/L (ref 136–145)
WBC # BLD AUTO: 7 K/UL (ref 4.1–11.1)

## 2021-09-13 PROCEDURE — 74011000250 HC RX REV CODE- 250: Performed by: INTERNAL MEDICINE

## 2021-09-13 PROCEDURE — 74011000258 HC RX REV CODE- 258: Performed by: INTERNAL MEDICINE

## 2021-09-13 PROCEDURE — 85027 COMPLETE CBC AUTOMATED: CPT

## 2021-09-13 PROCEDURE — 36415 COLL VENOUS BLD VENIPUNCTURE: CPT

## 2021-09-13 PROCEDURE — 80048 BASIC METABOLIC PNL TOTAL CA: CPT

## 2021-09-13 PROCEDURE — 83735 ASSAY OF MAGNESIUM: CPT

## 2021-09-13 PROCEDURE — 92960 CARDIOVERSION ELECTRIC EXT: CPT | Performed by: INTERNAL MEDICINE

## 2021-09-13 RX ORDER — SODIUM CHLORIDE 0.9 % (FLUSH) 0.9 %
5-40 SYRINGE (ML) INJECTION AS NEEDED
Status: DISCONTINUED | OUTPATIENT
Start: 2021-09-13 | End: 2021-09-13 | Stop reason: HOSPADM

## 2021-09-13 RX ORDER — SODIUM CHLORIDE 0.9 % (FLUSH) 0.9 %
5-40 SYRINGE (ML) INJECTION EVERY 8 HOURS
Status: DISCONTINUED | OUTPATIENT
Start: 2021-09-13 | End: 2021-09-13 | Stop reason: HOSPADM

## 2021-09-13 RX ADMIN — SODIUM CHLORIDE 56.85 MG: 9 INJECTION, SOLUTION INTRAVENOUS at 10:46

## 2021-09-13 RX ADMIN — SODIUM CHLORIDE 56.85 MG: 9 INJECTION, SOLUTION INTRAVENOUS at 10:50

## 2021-09-13 NOTE — ROUTINE PROCESS
9:45 AM  Patient arrived. ID and allergies verified verbally with patient. Pt voices understanding of procedure to be performed. Consent obtained. Pt prepped for procedure. 10:36 AM  TRANSFER - OUT REPORT:    Verbal report given to nick nelson.(name) on Markus Sr  being transferred to ep lab(unit) for ordered procedure       Report consisted of patients Situation, Background, Assessment and   Recommendations(SBAR). Information from the following report(s) SBAR was reviewed with the receiving nurse. Lines:   Peripheral IV 09/13/21 Left Antecubital (Active)   Site Assessment Clean, dry, & intact 09/13/21 1011   Phlebitis Assessment 0 09/13/21 1011   Dressing Status Clean, dry, & intact 09/13/21 1011   Dressing Type Transparent 09/13/21 1011   Hub Color/Line Status Pink 09/13/21 1011        Opportunity for questions and clarification was provided. Patient transported with:   Registered Nurse    10:56 AM  TRANSFER - IN REPORT:    Verbal report received from [facility] and the [facility], rn.(name) on Cisiv  being received from ep lab(unit) for routine progression of care      Report consisted of patients Situation, Background, Assessment and   Recommendations(SBAR). Information from the following report(s) Procedure Summary was reviewed with the receiving nurse. Opportunity for questions and clarification was provided. Assessment completed upon patients arrival to unit and care assumed.

## 2021-09-13 NOTE — H&P
HISTORY OF PRESENT ILLNESS      Romayne Polio is a 61 y.o. male who underwent AFL ablation in the past with subsequent recurrence and repeat CTI ablation last month whose monitor shows recurrent AFL (as well as PVCs). He is very symptomatic with HERRERA. Has been compliant with medications including anticoagulation since his procedure. He then underwent repeat atypical/ right atrial flutter ablation (An area of slow conduction mid-RA was identified with delayed/fractionated potentials where ablation was performed and resulted in termination of the tachycardia nearly immediately following onset of RF energy. Bidirectional conduction block was demonstrated across the CTI). Repeat monitor report with atypical atrial flutter and PVCs. He was  asymptomatic- feeling improvement since his procedure. Instructed him to restart his Diltiazem 120 mg daily and underwent repeat atypical flutter ablation. He again had recurrence of AF with RVR and had successful AF ablation on 7/6/2021 and has maintained NSR since procedure. Diltiazem and Eliquis were continued. EKG shows sinus rhythm with PVCs. Had recurrent palps and ekg shows AF today.           PAST MEDICAL HISTORY     Past Medical History:   Diagnosis Date    Abnormal echocardiogram 10/28/2010    Atrial flutter (HCC)     s/p ablation     Dyslipidemia     Gout     High blood pressure     High blood pressure     Obesity 10/28/2010    Prediabetes     [facility] ([facility] [address]     S/P ablation of atrial flutter     Supraventricular tachycardia ([facility] [address] 10/28/2010           PAST SURGICAL HISTORY     Past Surgical History:   Procedure Laterality Date    HX ACL RECONSTRUCTION  6/2013    menniscus    HX ROTATOR CUFF REPAIR Left 2005    HX ROTATOR CUFF REPAIR Right 2007    HX WISDOM TEETH EXTRACTION      INTRACARD ECHO, THER/DX INTERVENT N/A 1/7/2021    Intracardiac Echocardiogram performed by Antoni Medel MD at [facility] SURG PROCEDURE UNLIST  2016    cardiac ablation    DE COMPRE ELECTROPHYSIOL XM W/LEFT ATRIAL PACNG/REC N/A 12/22/2020    Lt Atrial Pace & Record During Ep Study performed by Kimberly Yuan MD at [place] [address], [facility] CATH LAB    DE COMPRE ELECTROPHYSIOL XM W/LEFT ATRIAL PACNG/REC N/A 1/26/2021    Lt Atrial Pace & Record During Ep Study performed by Kimberly Yuan MD at [place] [address], [facility] [address] CATH LAB    DE EPHYS EVAL W/ABLATION [facility] ARRHYTHMIA N/A 12/22/2020    ABLATION SVT performed by Kimberly Yuan MD at [place] [address], [facility] CATH LAB    DE EPHYS EVAL W/ABLATION SUPRAVENT ARRHYTHMIA N/A 1/7/2021    Ablation A-Flutter performed by Kimberly Yuan MD at [place] [address], [facility] [address] CATH LAB    DE EPHYS EVAL W/ABLATION SUPRAVENT ARRHYTHMIA N/A 1/26/2021    Ablation A-Flutter performed by Kimberly Yuan MD at [place] [address], [facility] [address] CATH LAB    DE INTRACARDIAC ELECTROPHYSIOLOGIC 3D MAPPING N/A 12/22/2020    Ep 3d Mapping performed by Kimberly Yuan MD at [place] [address], [facility] [address] CATH LAB    DE INTRACARDIAC ELECTROPHYSIOLOGIC 3D MAPPING N/A 1/7/2021    Ep 3d Mapping performed by Kimberly Yuan MD at [place] [address], [facility] [address] CATH LAB    DE INTRACARDIAC ELECTROPHYSIOLOGIC 3D MAPPING N/A 1/26/2021    Ep 3d Mapping performed by Kimberly Yuan MD at [place] [address], [facility] [address] CATH LAB          ALLERGIES     Allergies   Allergen Reactions    Percocet [Oxycodone-Acetaminophen] Itching     Pt has used hydrocodone          FAMILY HISTORY     Family History   Problem Relation Age of Onset    No Known Problems Mother     No Known Problems Father     Cancer Sister 43        breast    No Known Problems Sister     negative for cardiac disease       SOCIAL HISTORY     Social History     Socioeconomic History    Marital status: SINGLE     Spouse name: Not on file    Number of children: Not on file    Years of education: Not on file    Highest education level: Not on file   Tobacco Use    Smoking status: Never Smoker    Smokeless tobacco: Never Used   Substance and Sexual Activity    Alcohol use:  Yes Alcohol/week: 12.0 standard drinks     Types: 12 Cans of beer per week     Comment: reports not drinking during the week, only on weekends    Drug use: No     Social Determinants of Health     Financial Resource Strain:     Difficulty of Paying Living Expenses:    Food Insecurity:     Worried About Running Out of Food in the Last Year:     [address] in the Last Year:    Transportation Needs:     Lack of Transportation (Medical):  Lack of Transportation (Non-Medical):    Physical Activity:     Days of Exercise per Week:     Minutes of Exercise per Session:    Stress:     Feeling of Stress :    Social Connections:     Frequency of Communication with Friends and Family:     Frequency of Social Gatherings with Friends and Family:     Attends [employer] orth[employer] Services:     Active Member of Clubs or Organizations:     Attends Club or Organization Meetings:     Marital Status:          MEDICATIONS     Current Facility-Administered Medications   Medication Dose Route Frequency    sodium chloride (NS) flush 5-40 mL  5-40 mL IntraVENous Q8H    sodium chloride (NS) flush 5-40 mL  5-40 mL IntraVENous PRN       I have reviewed the nurses notes, vitals, problem list, allergy list, medical history, family, social history and medications. REVIEW OF SYMPTOMS      General: Pt denies excessive weight gain or loss. Pt is able to conduct ADL's  HEENT: Denies blurred vision, headaches, hearing loss, epistaxis and difficulty swallowing. Respiratory: Denies cough, congestion, shortness of breath, HERRERA, wheezing or stridor.   Cardiovascular: Denies precordial pain, palpitations, edema or PND  Gastrointestinal: Denies poor appetite, indigestion, abdominal pain or blood in stool  Genitourinary: Denies hematuria, dysuria, increased urinary frequency  Musculoskeletal: Denies joint pain or swelling from muscles or joints  Neurologic: Denies tremor, paresthesias, headache, or sensory motor disturbance  Psychiatric: Denies confusion, insomnia, depression  Integumentray: Denies rash, itching or ulcers. Hematologic: Denies easy bruising, bleeding       PHYSICAL EXAMINATION      Vitals: see vitals section  General: Well developed, in no acute distress. HEENT: No jaundice, oral mucosa moist, no oral ulcers  Neck: Supple, no stiffness, no lymphadenopathy, supple  Heart:  irreg irreg, no murmur, gallop or rub, no jugular venous distention  Respiratory: Clear bilaterally x 4, no wheezing or rales  Abdomen:   Soft, non-tender, bowel sounds are active. Extremities:  No edema, normal cap refill, no cyanosis. Musculoskeletal: No clubbing, no deformities  Neuro: A&Ox3, speech clear, gait stable, cooperative, no focal neurologic deficits  Skin: Skin color is normal. No rashes or lesions. Non diaphoretic, moist.  Vascular: 2+ pulses symmetric in all extremities       DIAGNOSTIC DATA      EKG: AF    Visit Vitals  BP (!) 143/87 (BP 1 Location: Right upper arm, BP Patient Position: At rest)   Pulse (!) 130   Temp 98 °F (36.7 °C)   Resp 20   Ht 6' 2\" (1.88 m)   Wt 250 lb 9.6 oz (113.7 kg)   SpO2 98%   BMI 32.18 kg/m²          LABORATORY DATA      Lab Results   Component Value Date/Time    WBC 7.0 09/13/2021 10:07 AM    HGB 16.2 09/13/2021 10:07 AM    HCT 49.8 09/13/2021 10:07 AM    PLATELET 598 91/58/8620 10:07 AM    MCV 82.3 09/13/2021 10:07 AM      Lab Results   Component Value Date/Time    Sodium 138 09/13/2021 10:07 AM    Potassium 4.0 09/13/2021 10:07 AM    Chloride 108 09/13/2021 10:07 AM    CO2 27 09/13/2021 10:07 AM    Anion gap 3 (L) 09/13/2021 10:07 AM    Glucose 123 (H) 09/13/2021 10:07 AM    BUN 10 09/13/2021 10:07 AM    Creatinine 0.98 09/13/2021 10:07 AM    BUN/Creatinine ratio 10 (L) 09/13/2021 10:07 AM    GFR est AA >60 09/13/2021 10:07 AM    GFR est non-AA >60 09/13/2021 10:07 AM    Calcium 8.6 09/13/2021 10:07 AM    Bilirubin, total 0.6 06/30/2021 03:34 PM    Alk.  phosphatase 58 06/30/2021 03:34 PM    Protein, total 6.9 06/30/2021 03:34 PM    Albumin 4.4 06/30/2021 03:34 PM    Globulin 3.5 01/07/2021 07:52 AM    A-G Ratio 1.8 06/30/2021 03:34 PM    ALT (SGPT) 16 06/30/2021 03:34 PM         ASSESSMENT/PLAN         1. Atrial flutter              A. CTI ablation x 2             B. Right lateral atypical atrial flutter s/p ablation x2   C. PVI 7/6/2021  2. Atrial fibrillation  3. PVCs  4.  Right atrial scar      [place] [address], [address], [address] [place] [address]  [phone] / [phone] Fax   [phone] / [phone] Fax

## 2021-09-13 NOTE — DISCHARGE INSTRUCTIONS
Patient Education        Electrical Cardioversion: Before Your Procedure  What is electrical cardioversion? Electrical cardioversion is a treatment for a heartbeat that isn't normal, such as atrial fibrillation. It uses a brief electric shock to reset your heart's rhythm. Before the treatment, you will get medicine to make you sleepy. You should not feel any pain. Your doctor will put patches on your chest. Or you might get them on both your chest and back. They send a brief electric current to your heart. In most cases, this restores the heart's normal rhythm right away. Cardioversion itself takes about 5 minutes. But the whole procedure will likely take about 30 to 45 minutes. That includes time to recover. Abnormal heart rhythms sometimes come back after the treatment. You may need to take medicines. These may help your heart keep its normal rhythm. Follow-up care is a key part of your treatment and safety. Be sure to make and go to all appointments, and call your doctor if you are having problems. It's also a good idea to know your test results and keep a list of the medicines you take. How do you prepare for the procedure? Procedures can be stressful. This information will help you understand what you can expect. And it will help you safely prepare for your procedure. Preparing for the procedure    · Be sure you have someone to take you home. Anesthesia and pain medicine will make it unsafe for you to drive or get home on your own.     · Understand exactly what procedure is planned, along with the risks, benefits, and other options.     · If you take aspirin or some other blood thinner, ask your doctor if you should stop taking it before your procedure. Make sure that you understand exactly what your doctor wants you to do. These medicines increase the risk of bleeding.     · Tell your doctor ALL the medicines, vitamins, supplements, and herbal remedies you take.  Some may increase the risk of problems during your procedure. Your doctor will tell you if you should stop taking any of them before the procedure and how soon to do it.     · Make sure your doctor and the hospital have a copy of your advance directive. If you don't have one, you may want to prepare one. It lets others know your health care wishes. It's a good thing to have before any type of surgery or procedure. What happens on the day of the procedure? · Follow the instructions exactly about when to stop eating and drinking. If you don't, your procedure may be canceled. If your doctor told you to take your medicines on the day of the procedure, take them with only a sip of water.     · Take a bath or shower before you come in for your procedure. Do not apply lotions, perfumes, deodorants, or nail polish.     · Take off all jewelry and piercings. And take out contact lenses, if you wear them. At the hospital or surgery center   · Bring a picture ID.     · You will get medicine to make you sleepy.     · The procedure will take about 30 to 45 minutes. When should you call your doctor? · You have questions or concerns.     · You don't understand how to prepare for your procedure.     · You become ill before the procedure (such as fever, flu, or a cold).     · You need to reschedule or have changed your mind about having the procedure. Where can you learn more? Go to http://www.gray.com/  Enter S549 in the search box to learn more about \"Electrical Cardioversion: Before Your Procedure. \"  Current as of: August 31, 2020               Content Version: 12.8  © 2006-2021 [employer]. Care instructions adapted under license by [employer] (which disclaims liability or warranty for this information).  If you have questions about a medical condition or this instruction, always ask your healthcare professional. [address] any warranty or liability for your use of this information.

## 2021-09-27 ENCOUNTER — OFFICE VISIT (OUTPATIENT)
Dept: CARDIOLOGY CLINIC | Age: 66
End: 2021-09-27
Payer: COMMERCIAL

## 2021-09-27 VITALS
SYSTOLIC BLOOD PRESSURE: 136 MMHG | HEIGHT: 74 IN | DIASTOLIC BLOOD PRESSURE: 74 MMHG | BODY MASS INDEX: 33.21 KG/M2 | HEART RATE: 117 BPM | RESPIRATION RATE: 18 BRPM | OXYGEN SATURATION: 98 % | WEIGHT: 258.8 LBS

## 2021-09-27 DIAGNOSIS — I48.0 AF (PAROXYSMAL ATRIAL FIBRILLATION) (HCC): Primary | ICD-10-CM

## 2021-09-27 DIAGNOSIS — I48.92 ATRIAL FLUTTER, UNSPECIFIED TYPE (HCC): ICD-10-CM

## 2021-09-27 DIAGNOSIS — I48.91 ATRIAL FIBRILLATION, UNSPECIFIED TYPE (HCC): ICD-10-CM

## 2021-09-27 DIAGNOSIS — I48.4 ATYPICAL ATRIAL FLUTTER (HCC): ICD-10-CM

## 2021-09-27 PROCEDURE — 93000 ELECTROCARDIOGRAM COMPLETE: CPT | Performed by: NURSE PRACTITIONER

## 2021-09-27 PROCEDURE — 99215 OFFICE O/P EST HI 40 MIN: CPT | Performed by: NURSE PRACTITIONER

## 2021-09-27 NOTE — PROGRESS NOTES
Room #: 2    Feels like the DCCV only lasted a few days. Feels like his heart is \"off\"      Visit Vitals  /74 (BP 1 Location: Left upper arm, BP Patient Position: Sitting, BP Cuff Size: Large adult)   Pulse (!) 117   Resp 18   Ht 6' 2\" (1.88 m)   Wt 258 lb 12.8 oz (117.4 kg)   SpO2 98%   BMI 33.23 kg/m²         Chest pain:  NO  Shortness of breath:  NO  Edema: NO  Palpitations, skipped beats, rapid heartbeat:  NO  Dizziness:  NO    1. Have you been to the ER, urgent care clinic since your last visit? Hospitalized since your last visit? No    2. Have you seen or consulted any other health care providers outside of the [address] [place] [address] since your last visit? Include any pap smears or colon screening.  No      Refills:  NO

## 2021-09-27 NOTE — PROGRESS NOTES
HISTORY OF PRESENT ILLNESS      Joaquim Peterson is a 61 y.o. male who underwent AFL ablation in the past with subsequent recurrence and repeat CTI ablation last month whose monitor shows recurrent AFL (as well as PVCs). He is very symptomatic with HERRERA. Has been compliant with medications including anticoagulation since his procedure. He then underwent repeat atypical/ right atrial flutter ablation (An area of slow conduction mid-RA was identified with delayed/fractionated potentials where ablation was performed and resulted in termination of the tachycardia nearly immediately following onset of RF energy. Bidirectional conduction block was demonstrated across the CTI). Repeat monitor report with atypical atrial flutter and PVCs. He was  asymptomatic- feeling improvement since his procedure. Instructed him to restart his Diltiazem 120 mg daily and underwent repeat atypical flutter ablation. He again had recurrence of AF with RVR and had successful AF ablation on 7/6/2021 and has maintained NSR since procedure. Diltiazem and Eliquis were continued. EKG shows sinus rhythm with PVCs. Had recurrent palps and ekg shows AF today. His diltiazem was Increased to 240 mg daily and he underwent bria/dccv, however had recurrence of AF one week ago.           PAST MEDICAL HISTORY     Past Medical History:   Diagnosis Date    Abnormal echocardiogram 10/28/2010    Atrial flutter (HCC)     s/p ablation     Dyslipidemia     Gout     High blood pressure     High blood pressure     Obesity 10/28/2010    Prediabetes     Pro[facility] ([facility] [address]     S/P ablation of atrial flutter     Supraventricular tachycardia ([address] 10/28/2010           PAST SURGICAL HISTORY     Past Surgical History:   Procedure Laterality Date    HX ACL RECONSTRUCTION  6/2013    menniscus    HX ROTATOR CUFF REPAIR Left 2005    HX ROTATOR CUFF REPAIR Right 2007    HX WISDOM TEETH EXTRACTION      INTRACARD ECHO, THER/DX INTERVENT N/A 1/7/2021    Intracardiac Echocardiogram performed by Christopher Briones MD at [place] [address], [facility]/s Dr CATH LAB   [place] 2016    cardiac ablation    NV COMPRE ELECTROPHYSIOL XM W/LEFT ATRIAL PACNG/REC N/A 12/22/2020    Lt Atrial Pace & Record During Ep Study performed by Christopher Briones MD at [place] [address], [facility] Dr CATH LAB    NV COMPRE ELECTROPHYSIOL XM W/LEFT ATRIAL PACNG/REC N/A 1/26/2021    Lt Atrial Pace & Record During Ep Study performed by Christopher Briones MD at [place] [address], [facility] Dr CATH LAB    NV EPHYS EVAL W/ABLATION SUPRAVENT ARRHYTHMIA N/A 12/22/2020    ABLATION SVT performed by Christopher Briones MD at [place] [address], [facility] Dr CATH LAB    NV EPHYS EVAL W/ABLATION SUPRAVENT ARRHYTHMIA N/A 1/7/2021    Ablation A-Flutter performed by Christopher Briones MD at [place] [address], [facility] Dr CATH LAB    NV EPHYS EVAL W/ABLATION SUPRAVENT ARRHYTHMIA N/A 1/26/2021    Ablation A-Flutter performed by Christopher Briones MD at [place] [address], [facility] Dr CATH LAB    NV INTRACARDIAC ELECTROPHYSIOLOGIC 3D MAPPING N/A 12/22/2020    Ep 3d Mapping performed by Christopher Briones MD at [place] [address], [facility] Dr CATH LAB    NV INTRACARDIAC ELECTROPHYSIOLOGIC 3D MAPPING N/A 1/7/2021    Ep 3d Mapping performed by Christopher Briones MD at [place] [address], [facility] Dr CATH LAB    NV INTRACARDIAC ELECTROPHYSIOLOGIC 3D MAPPING N/A 1/26/2021    Ep 3d Mapping performed by Christopher Briones MD at [place] [address], [facility] Dr CATH LAB          ALLERGIES     Allergies   Allergen Reactions    Percocet [Oxycodone-Acetaminophen] Itching     Pt has used hydrocodone          FAMILY HISTORY     Family History   Problem Relation Age of Onset    No Known Problems Mother     No Known Problems Father     Cancer Sister 43        breast    No Known Problems Sister     negative for cardiac disease       SOCIAL HISTORY     Social History     Socioeconomic History    Marital status: SINGLE     Spouse name: Not on file    Number of children: Not on file    Years of education: Not on file    Highest education level: Not on file   Tobacco Use    Smoking status: Never Smoker    Smokeless tobacco: Never Used   Substance and Sexual Activity    Alcohol use: Yes     Alcohol/week: 12.0 standard drinks     Types: 12 Cans of beer per week     Comment: reports not drinking during the week, only on weekends    Drug use: No     Social Determinants of Health     Financial Resource Strain:     Difficulty of Paying Living Expenses:    Food Insecurity:     Worried About Running Out of Food in the Last Year:     Ran Out of Food in the Last Year:    Transportation Needs:     Lack of Transportation (Medical):  Lack of Transportation (Non-Medical):    Physical Activity:     Days of Exercise per Week:     Minutes of Exercise per Session:    Stress:     Feeling of Stress :    Social Connections:     Frequency of Communication with Friends and Family:     Frequency of Social Gatherings with Friends and Family:     Attends [employer] Services:     Active Member of Clubs or Organizations:     Attends Club or Organization Meetings:     Marital Status:          MEDICATIONS     Current Outpatient Medications   Medication Sig    dilTIAZem ER (DILACOR XR) 240 mg capsule Take 1 Capsule by mouth daily.  apixaban (Eliquis) 5 mg tablet Take 1 Tablet by mouth two (2) times a day.  pantoprazole (PROTONIX) 40 mg tablet Take 1 Tablet by mouth daily.  allopurinoL (ZYLOPRIM) 100 mg tablet Take 100 mg by mouth daily. Switched from [place] to allopurinol on 7/17/2020     No current facility-administered medications for this visit. I have reviewed the nurses notes, vitals, problem list, allergy list, medical history, family, social history and medications. REVIEW OF SYMPTOMS      General: Pt denies excessive weight gain or loss. Pt is able to conduct ADL's  HEENT: Denies blurred vision, headaches, hearing loss, epistaxis and difficulty swallowing. Respiratory: Denies cough, congestion, shortness of breath, HERRERA, wheezing or stridor.   Cardiovascular: Denies precordial pain, palpitations, edema or PND  Gastrointestinal: Denies poor appetite, indigestion, abdominal pain or blood in stool  Genitourinary: Denies hematuria, dysuria, increased urinary frequency  Musculoskeletal: Denies joint pain or swelling from muscles or joints  Neurologic: Denies tremor, paresthesias, headache, or sensory motor disturbance  Psychiatric: Denies confusion, insomnia, depression  Integumentray: Denies rash, itching or ulcers. Hematologic: Denies easy bruising, bleeding       PHYSICAL EXAMINATION      Vitals: see vitals section  General: Well developed, in no acute distress. HEENT: No jaundice, oral mucosa moist, no oral ulcers  Neck: Supple, no stiffness, no lymphadenopathy, supple  Heart:  +irreg irreg, no murmur, gallop or rub, no jugular venous distention  Respiratory: Clear bilaterally x 4, no wheezing or rales  Abdomen:   Soft, non-tender, bowel sounds are active. Extremities:  No edema, normal cap refill, no cyanosis. Musculoskeletal: No clubbing, no deformities  Neuro: A&Ox3, speech clear, gait stable, cooperative, no focal neurologic deficits  Skin: Skin color is normal. No rashes or lesions.  Non diaphoretic, moist.  Vascular: 2+ pulses symmetric in all extremities       DIAGNOSTIC DATA      EKG: AF    Visit Vitals  /74 (BP 1 Location: Left upper arm, BP Patient Position: Sitting, BP Cuff Size: Large adult)   Pulse (!) 117   Resp 18   Ht 6' 2\" (1.88 m)   Wt 258 lb 12.8 oz (117.4 kg)   SpO2 98%   BMI 33.23 kg/m²          LABORATORY DATA      Lab Results   Component Value Date/Time    WBC 7.0 09/13/2021 10:07 AM    HGB 16.2 09/13/2021 10:07 AM    HCT 49.8 09/13/2021 10:07 AM    PLATELET 846 95/14/3009 10:07 AM    MCV 82.3 09/13/2021 10:07 AM      Lab Results   Component Value Date/Time    Sodium 138 09/13/2021 10:07 AM    Potassium 4.0 09/13/2021 10:07 AM    Chloride 108 09/13/2021 10:07 AM    CO2 27 09/13/2021 10:07 AM    Anion gap 3 (L) 09/13/2021 10:07 AM    Glucose 123 (H) 09/13/2021 10:07 AM    BUN 10 09/13/2021 10:07 AM    Creatinine 0.98 09/13/2021 10:07 AM    BUN/Creatinine ratio 10 (L) 09/13/2021 10:07 AM    GFR est AA >60 09/13/2021 10:07 AM    GFR est non-AA >60 09/13/2021 10:07 AM    Calcium 8.6 09/13/2021 10:07 AM    Bilirubin, total 0.6 06/30/2021 03:34 PM    Alk. phosphatase 58 06/30/2021 03:34 PM    Protein, total 6.9 06/30/2021 03:34 PM    Albumin 4.4 06/30/2021 03:34 PM    Globulin 3.5 01/07/2021 07:52 AM    A-G Ratio 1.8 06/30/2021 03:34 PM    ALT (SGPT) 16 06/30/2021 03:34 PM         ASSESSMENT/PLAN         1. Atrial flutter              A. CTI ablation x 2             B. Right lateral atypical atrial flutter s/p ablation x2   C. PVI 7/6/2021  2. Atrial fibrillation  3. PVCs  4. Right atrial scar    He is understandably frustrated with his frequent recurrence of AF. Years ago, he was diagnosed with a \"mild\" sleep apnea after a sleep study and denies additional follow up. Recommend repeat sleep study to evaluate need for CPAP. Additionally, recommended starting Amiodarone 200mg daily and planning for repeat AF ablation with possible cardioversion prior to procedure. He asked that I discuss with Dr. Alex Staton and notify him of a plan prior to starting Amiodarone and I will do so. Will consider hybrid ablation for persistent AF. FOLLOW UP       Thank you, Denver Benedict, MD for allowing me to participate in the care of this extraordinarily pleasant male. Please do not hesitate to contact me for further questions/concerns.      AKILAH [facility].  [address] [facility], [facility], [address] [place] [address], [address]    [place], [address]  [phone] / [phone] Fax   [phone] / [phone] Fax

## 2021-09-27 NOTE — LETTER
9/27/2021    Patient: Abagail Pallas   YOB: 1955   Date of Visit: 9/27/2021     [phone]  Via Fax: [phone]    Dear Devaughn Oropeza MD,      Thank you for referring Mr. Cathy Bedoya to [address]  for evaluation. My notes for this consultation are attached. If you have questions, please do not hesitate to call me. I look forward to following your patient along with you.       Sincerely,    Thalia Mahan NP

## 2021-09-30 ENCOUNTER — TELEPHONE (OUTPATIENT)
Dept: CARDIOLOGY CLINIC | Age: 66
End: 2021-09-30

## 2021-09-30 DIAGNOSIS — I48.91 ATRIAL FIBRILLATION, UNSPECIFIED TYPE (HCC): ICD-10-CM

## 2021-09-30 DIAGNOSIS — I48.0 AF (PAROXYSMAL ATRIAL FIBRILLATION) (HCC): Primary | ICD-10-CM

## 2021-09-30 RX ORDER — AMIODARONE HYDROCHLORIDE 200 MG/1
200 TABLET ORAL DAILY
Qty: 30 TABLET | Refills: 3
Start: 2021-09-30 | End: 2021-11-29

## 2021-09-30 NOTE — TELEPHONE ENCOUNTER
AKILAH Beauchamp RN; Pippa Nice LPN  Please let him know that I talked with Dr. Darlene Howe and he agrees with starting Amiodarone 200mg daily, completing sleep study and referral to Dr. Mildred George.             Returned patient call, ID verified using two patient identifiers. Notified patient of Dr. Darlene Howe recommendations above. Patient states he has plenty of Amiodarone at home and will start taking. Advised patient that I have sent a referral to Dr. Mildred George to discuss hybrid ablation. Patient states he has many other health concerns that need to be addressed which are being delayed because of him being on eliquis. Advised him that I will notify Dr. Darlene Howe of his concerns so a plan an be made of how he can proceed. Patient verbalized understanding and will call with any other questions.

## 2021-10-20 ENCOUNTER — OFFICE VISIT (OUTPATIENT)
Dept: CARDIOLOGY CLINIC | Age: 66
End: 2021-10-20
Payer: COMMERCIAL

## 2021-10-20 VITALS
DIASTOLIC BLOOD PRESSURE: 76 MMHG | SYSTOLIC BLOOD PRESSURE: 118 MMHG | HEIGHT: 74 IN | HEART RATE: 104 BPM | BODY MASS INDEX: 33.11 KG/M2 | WEIGHT: 258 LBS | OXYGEN SATURATION: 96 %

## 2021-10-20 DIAGNOSIS — I48.0 AF (PAROXYSMAL ATRIAL FIBRILLATION) (HCC): Primary | ICD-10-CM

## 2021-10-20 PROCEDURE — 99215 OFFICE O/P EST HI 40 MIN: CPT | Performed by: INTERNAL MEDICINE

## 2021-10-20 PROCEDURE — 93000 ELECTROCARDIOGRAM COMPLETE: CPT | Performed by: INTERNAL MEDICINE

## 2021-10-20 RX ORDER — SILDENAFIL CITRATE 20 MG/1
20 TABLET ORAL AS NEEDED
COMMUNITY
Start: 2021-10-05 | End: 2022-07-14

## 2021-10-20 NOTE — PROGRESS NOTES
HISTORY OF PRESENT ILLNESS      Robbie Mabry is a 61 y.o. male who underwent AFL ablation in the past with subsequent recurrence and repeat CTI ablation last month whose monitor shows recurrent AFL (as well as PVCs). He is very symptomatic with HERRERA. Has been compliant with medications including anticoagulation since his procedure. He then underwent repeat atypical/ right atrial flutter ablation (An area of slow conduction mid-RA was identified with delayed/fractionated potentials where ablation was performed and resulted in termination of the tachycardia nearly immediately following onset of RF energy. Bidirectional conduction block was demonstrated across the CTI). Repeat monitor report with atypical atrial flutter and PVCs. He was  asymptomatic- feeling improvement since his procedure. Instructed him to restart his Diltiazem 120 mg daily and underwent repeat atypical flutter ablation. He again had recurrence of AF with RVR and had successful AF ablation on 7/6/2021 and has maintained NSR since procedure. Diltiazem and Eliquis were continued. EKG shows sinus rhythm with PVCs. Had recurrent palps and ekg showed AF. His diltiazem was Increased to 240 mg daily and he underwent bria/dccv, however had recurrence of AF one week ago. He reports today that despite being in AF, he is quite tolerant of it other than easy fatigue.           PAST MEDICAL HISTORY     Past Medical History:   Diagnosis Date    Abnormal echocardiogram 10/28/2010    Atrial flutter (HCC)     s/p ablation     Dyslipidemia     Gout     High blood pressure     High blood pressure     Obesity 10/28/2010    Prediabetes     Prostate cancer ([facility] [address]     S/P ablation of atrial flutter     Supraventricular tachycardia ([address] 10/28/2010           PAST SURGICAL HISTORY     Past Surgical History:   Procedure Laterality Date    HX ACL RECONSTRUCTION  6/2013    menniscus    HX ROTATOR CUFF REPAIR Left 2005    HX ROTATOR CUFF REPAIR Right 2007    HX WISDOM TEETH EXTRACTION      INTRACARD ECHO, THER/DX INTERVENT N/A 1/7/2021    Intracardiac Echocardiogram performed by Quin Laughlin MD at [place] [address], [facility]/s Dr CATH LAB   [facility]  2016    cardiac ablation    TX COMPRE ELECTROPHYSIOL XM W/LEFT ATRIAL PACNG/REC N/A 12/22/2020    Lt Atrial Pace & Record During Ep Study performed by Quin Laughlin MD at [place] [address], [facility] Dr CATH LAB    TX COMPRE ELECTROPHYSIOL XM W/LEFT ATRIAL PACNG/REC N/A 1/26/2021    Lt Atrial Pace & Record During Ep Study performed by Quin Laughlin MD at [place] [address], [facility] Dr CATH LAB    TX EPHYS EVAL W/ABLATION SUPRAVENT ARRHYTHMIA N/A 12/22/2020    ABLATION SVT performed by Quin Laughlin MD at [place] [address], [facility] Dr CATH LAB    TX EPHYS EVAL W/ABLATION SUPRAVENT ARRHYTHMIA N/A 1/7/2021    Ablation A-Flutter performed by Quin Laughlin MD at [place] [address], [facility] Dr CATH LAB    TX EPHYS EVAL W/ABLATION SUPRAVENT ARRHYTHMIA N/A 1/26/2021    Ablation A-Flutter performed by Quin Laughlin MD at [place] [address], [facility] Dr CATH LAB    TX INTRACARDIAC ELECTROPHYSIOLOGIC 3D MAPPING N/A 12/22/2020    Ep 3d Mapping performed by Quin Laughlin MD at [place] [address], [facility] Dr CATH LAB    TX INTRACARDIAC ELECTROPHYSIOLOGIC 3D MAPPING N/A 1/7/2021    Ep 3d Mapping performed by Quin Laughlin MD at [place] [address], [facility] Dr CATH LAB    TX INTRACARDIAC ELECTROPHYSIOLOGIC 3D MAPPING N/A 1/26/2021    Ep 3d Mapping performed by Quin Laughlin MD at [place] [address], [facility] Dr CATH LAB          ALLERGIES     Allergies   Allergen Reactions    Percocet [Oxycodone-Acetaminophen] Itching     Pt has used hydrocodone          FAMILY HISTORY     Family History   Problem Relation Age of Onset    No Known Problems Mother     No Known Problems Father     Cancer Sister 43        breast    No Known Problems Sister     negative for cardiac disease       SOCIAL HISTORY     Social History     Socioeconomic History    Marital status: SINGLE     Spouse name: Not on file    Number of children: Not on file    Years of education: Not on file    Highest education level: Not on file   Tobacco Use    Smoking status: Never Smoker    Smokeless tobacco: Never Used   Substance and Sexual Activity    Alcohol use: Yes     Alcohol/week: 12.0 standard drinks     Types: 12 Cans of beer per week     Comment: reports not drinking during the week, only on weekends    Drug use: No     Social Determinants of Health     Financial Resource Strain:     Difficulty of Paying Living Expenses:    Food Insecurity:     Worried About Running Out of Food in the Last Year:     Ran Out of Food in the Last Year:    Transportation Needs:     Lack of Transportation (Medical):  Lack of Transportation (Non-Medical):    Physical Activity:     Days of Exercise per Week:     Minutes of Exercise per Session:    Stress:     Feeling of Stress :    Social Connections:     Frequency of Communication with Friends and Family:     Frequency of Social Gatherings with Friends and Family:     Attends L[employer] Services:     Active Member of Clubs or Organizations:     Attends Club or Organization Meetings:     Marital Status:          MEDICATIONS     Current Outpatient Medications   Medication Sig    sildenafiL, pulmonary hypertension, (REVATIO) 20 mg tablet Take 20 mg by mouth as needed.  amiodarone (CORDARONE) 200 mg tablet Take 1 Tablet by mouth daily.  dilTIAZem ER (DILACOR XR) 240 mg capsule Take 1 Capsule by mouth daily.  apixaban (Eliquis) 5 mg tablet Take 1 Tablet by mouth two (2) times a day.  allopurinoL (ZYLOPRIM) 100 mg tablet Take 100 mg by mouth daily. Switched from [place] to allopurinol on 7/17/2020    pantoprazole (PROTONIX) 40 mg tablet Take 1 Tablet by mouth daily. No current facility-administered medications for this visit. I have reviewed the nurses notes, vitals, problem list, allergy list, medical history, family, social history and medications.        REVIEW OF SYMPTOMS      General: Pt denies excessive weight gain or loss. Pt is able to conduct ADL's  HEENT: Denies blurred vision, headaches, hearing loss, epistaxis and difficulty swallowing. Respiratory: Denies cough, congestion, shortness of breath, HERRERA, wheezing or stridor. Cardiovascular: Denies precordial pain, palpitations, edema or PND  Gastrointestinal: Denies poor appetite, indigestion, abdominal pain or blood in stool  Genitourinary: Denies hematuria, dysuria, increased urinary frequency  Musculoskeletal: Denies joint pain or swelling from muscles or joints  Neurologic: Denies tremor, paresthesias, headache, or sensory motor disturbance  Psychiatric: Denies confusion, insomnia, depression  Integumentray: Denies rash, itching or ulcers. Hematologic: Denies easy bruising, bleeding       PHYSICAL EXAMINATION      Vitals: see vitals section  General: Well developed, in no acute distress. HEENT: No jaundice, oral mucosa moist, no oral ulcers  Neck: Supple, no stiffness, no lymphadenopathy, supple  Heart:  +irreg irreg, no murmur, gallop or rub, no jugular venous distention  Respiratory: Clear bilaterally x 4, no wheezing or rales  Abdomen:   Soft, non-tender, bowel sounds are active. Extremities:  No edema, normal cap refill, no cyanosis. Musculoskeletal: No clubbing, no deformities  Neuro: A&Ox3, speech clear, gait stable, cooperative, no focal neurologic deficits  Skin: Skin color is normal. No rashes or lesions.  Non diaphoretic, moist.  Vascular: 2+ pulses symmetric in all extremities       DIAGNOSTIC DATA      EKG: AF    Visit Vitals  /76 (BP 1 Location: Left upper arm, BP Patient Position: Sitting, BP Cuff Size: Large adult)   Pulse (!) 104   Ht 6' 2\" (1.88 m)   Wt 258 lb (117 kg)   SpO2 96%   BMI 33.13 kg/m²          LABORATORY DATA      Lab Results   Component Value Date/Time    WBC 7.0 09/13/2021 10:07 AM    HGB 16.2 09/13/2021 10:07 AM    HCT 49.8 09/13/2021 10:07 AM    PLATELET 313 87/43/0522 10:07 AM    MCV 82.3 09/13/2021 10:07 AM      Lab Results   Component Value Date/Time    Sodium 138 09/13/2021 10:07 AM    Potassium 4.0 09/13/2021 10:07 AM    Chloride 108 09/13/2021 10:07 AM    CO2 27 09/13/2021 10:07 AM    Anion gap 3 (L) 09/13/2021 10:07 AM    Glucose 123 (H) 09/13/2021 10:07 AM    BUN 10 09/13/2021 10:07 AM    Creatinine 0.98 09/13/2021 10:07 AM    BUN/Creatinine ratio 10 (L) 09/13/2021 10:07 AM    GFR est AA >60 09/13/2021 10:07 AM    GFR est non-AA >60 09/13/2021 10:07 AM    Calcium 8.6 09/13/2021 10:07 AM    Bilirubin, total 0.6 06/30/2021 03:34 PM    Alk. phosphatase 58 06/30/2021 03:34 PM    Protein, total 6.9 06/30/2021 03:34 PM    Albumin 4.4 06/30/2021 03:34 PM    Globulin 3.5 01/07/2021 07:52 AM    A-G Ratio 1.8 06/30/2021 03:34 PM    ALT (SGPT) 16 06/30/2021 03:34 PM         ASSESSMENT/PLAN         1. Atrial flutter              A. CTI ablation x 2             B. Right lateral atypical atrial flutter s/p ablation x2   C. PVI 7/6/2021  2. Atrial fibrillation  3. PVCs  4. Right atrial scar    He is considering repeat endocardial ablation rather than hybrid ablation. He is concerned AF may be driving his ED as well. He will consider and notify us of his decision. FOLLOW UP       Thank you, Christie Moya MD for allowing me to participate in the care of this extraordinarily pleasant male. Please do not hesitate to contact me for further questions/concerns.      Melissa Martínez MD    [facility].  [address] [facility], [address] [place] [address], [address] [place] [address] Drive    [facility]  [phone] / [phone] Fax   [phone] / [phone] Fax Notification Instructions: Patient will be notified of biopsy results. However, patient instructed to call the office if not contacted within 2 weeks.

## 2021-10-20 NOTE — PROGRESS NOTES
Room EP2    Reports he can feel heart is out of rhythm     Visit Vitals  /76 (BP 1 Location: Left upper arm, BP Patient Position: Sitting, BP Cuff Size: Large adult)   Pulse (!) 104   Ht 6' 2\" (1.88 m)   Wt 258 lb (117 kg)   SpO2 96%   BMI 33.13 kg/m²         Chest pain: no  Shortness of breath: no  Edema: no  Palpitations, Skipped beats, Rapid heartbeat: no  Dizziness: no    New diagnosis/Surgeries: no    ER/Hospitalizations: no    Refills: no

## 2021-10-28 ENCOUNTER — OFFICE VISIT (OUTPATIENT)
Dept: CARDIOTHORACIC SURGERY | Age: 66
End: 2021-10-28

## 2021-10-28 ENCOUNTER — TELEPHONE (OUTPATIENT)
Dept: CARDIOLOGY CLINIC | Age: 66
End: 2021-10-28

## 2021-10-28 NOTE — TELEPHONE ENCOUNTER
Patient requesting to speak with Ishmael Avilez. States that she \"wrote an order\" for him and has a few questions.        Phone: [phone]

## 2021-10-28 NOTE — TELEPHONE ENCOUNTER
Returned patient call, ID verified using two patient identifiers. Patient calling because he has a appointment with Dr. Georga Cranker on Monday and he is wondering if he should still go to that appointment. Advised patient that he should keep his appointment with Dr. Georga Cranker. Patient asking about the referral to sleep medicine that was ordered for him. He states that he has not heard anything from them. Referral to Dr. Danniel Claude faxed to [phone]. Patient verbalized understanding and will call with any other questions.       Future Appointments   Date Time Provider Marizol Plascencia   11/1/2021  2:00 PM El Mai MD [facility] BS AMB

## 2021-11-01 ENCOUNTER — OFFICE VISIT (OUTPATIENT)
Dept: CARDIOTHORACIC SURGERY | Age: 66
End: 2021-11-01
Payer: COMMERCIAL

## 2021-11-01 VITALS
SYSTOLIC BLOOD PRESSURE: 128 MMHG | BODY MASS INDEX: 33.03 KG/M2 | DIASTOLIC BLOOD PRESSURE: 74 MMHG | WEIGHT: 257.4 LBS | RESPIRATION RATE: 18 BRPM | TEMPERATURE: 97.9 F | HEART RATE: 60 BPM | OXYGEN SATURATION: 98 % | HEIGHT: 74 IN

## 2021-11-01 DIAGNOSIS — I48.91 ATRIAL FIBRILLATION, UNSPECIFIED TYPE (HCC): Primary | ICD-10-CM

## 2021-11-01 PROCEDURE — 99204 OFFICE O/P NEW MOD 45 MIN: CPT | Performed by: THORACIC SURGERY (CARDIOTHORACIC VASCULAR SURGERY)

## 2021-11-01 NOTE — PROGRESS NOTES
CSS Consult    Subjective:      Evangelista Palomino is a 61 y.o. male who was referred for cardiac evaluation atrial fibrillation. Pt has a significant PMH of HTN, afib, HLD, Gout, and obesity. Pt was diagnosed with Afib 6 months ago, previously atrial flutter - treated 4-5 yrs ago. He has been on Eliquis since last year and is tolerating well without bleeding. Very symptomatic with dyspnea on exertion, he is normally a very active person and is no longer able to do these activities without needing to rest. He is frustrated with his limitations. No surgeries to chest.     Pt lives alone, but has someone who would be able to assist him if needed. He works as a contractor, he drives most of the day. He gets around independently without assist devices. He is a nonsmoker, drinks 12-20 cans of beer on the weekends (3/4 weekends a month), and denies illicit drug use. Pt has a significant family history of his mother with atrial fibrillation. He had the J&J COVID-19 vaccine in August 2021.       Cardiac Testing-  ECHO: Will order to assess prior to surgery    Past Medical History:   Diagnosis Date    Abnormal echocardiogram 10/28/2010    Atrial flutter (HCC)     s/p ablation     Dyslipidemia     Gout     High blood pressure     High blood pressure     Obesity 10/28/2010    Prediabetes     Prostate cancer ([address]     S/P ablation of atrial flutter     Supraventricular tachycardia ([address] 10/28/2010     Past Surgical History:   Procedure Laterality Date    HX ACL RECONSTRUCTION  6/2013    menniscus    HX ROTATOR CUFF REPAIR Left 2005    HX ROTATOR CUFF REPAIR Right 2007    HX WISDOM TEETH EXTRACTION      INTRACARD ECHO, THER/DX INTERVENT N/A 1/7/2021    Intracardiac Echocardiogram performed by Yadira Cuellar MD at [place] [address], [facility] [address] CATH LAB    [place] CARDIAC SURG PROCEDURE UNLIST  2016    cardiac ablation    [place] COMPRE ELECTROPHYSIOL XM W/LEFT ATRIAL PACNG/REC N/A 12/22/2020    Lt Atrial Pace & Record During Ep Study performed by Wilton Benson MD at [place] [address], [facility] Dr CATH LAB    [place] COMPRE ELECTROPHYSIOL XM W/LEFT ATRIAL PACNG/REC N/A 1/26/2021    Lt Atrial Pace & Record During Ep Study performed by Wilton Benson MD at [place] [address], [facility] [address] CATH LAB    [place] EPHYS EVAL W/ABLATION SUPRAVENT ARRHYTHMIA N/A 12/22/2020    ABLATION SVT performed by Wilton Benson MD at [place] [address], [facility] [address] CATH LAB    [place] EPHYS EVAL W/ABLATION SUPRAVENT ARRHYTHMIA N/A 1/7/2021    Ablation A-Flutter performed by Wilton Benson MD at [place] [address], [facility] [address] CATH LAB    [place] EPHYS EVAL W/ABLATION SUPRAVENT ARRHYTHMIA N/A 1/26/2021    Ablation A-Flutter performed by iWlton Benson MD at [place] [address], [facility] [address] CATH LAB    [place] INTRACARDIAC ELECTROPHYSIOLOGIC 3D MAPPING N/A 12/22/2020    Ep 3d Mapping performed by Wilton Benson MD at [place] [address], [facility] [address] CATH LAB    [place] INTRACARDIAC ELECTROPHYSIOLOGIC 3D MAPPING N/A 1/7/2021    Ep 3d Mapping performed by Wilton Benson MD at [place] [address], [facility] [address] CATH LAB    [place] INTRACARDIAC ELECTROPHYSIOLOGIC 3D MAPPING N/A 1/26/2021    Ep 3d Mapping performed by Wilton Benson MD at [place] [address], [facility] [address] CATH LAB      Social History     Tobacco Use    Smoking status: Never Smoker    Smokeless tobacco: Never Used   Substance Use Topics    Alcohol use: Yes     Alcohol/week: 12.0 - 20.0 standard drinks     Types: 12 - 20 Cans of beer per week     Comment: reports not drinking during the week, only on weekends      Family History   Problem Relation Age of Onset    Atrial Fibrillation Mother     Arrhythmia Mother     No Known Problems Father     Cancer Sister 43        breast    No Known Problems Sister      Prior to Admission medications    Medication Sig Start Date End Date Taking? Authorizing Provider   amiodarone (CORDARONE) 200 mg tablet Take 1 Tablet by mouth daily. 9/30/21  Yes Maeve Jackson NP   dilTIAZem ER (DILACOR XR) 240 mg capsule Take 1 Capsule by mouth daily. 9/1/21  Yes Wilton Benson MD   apixaban (Eliquis) 5 mg tablet Take 1 Tablet by mouth two (2) times a day.  8/5/21  Yes Aleida Lopes MD   allopurinoL (ZYLOPRIM) 100 mg tablet Take 100 mg by mouth daily. Switched from [address] [place] [address] to allopurinol on 7/17/2020   Yes Provider, Historical   sildenafiL, pulmonary hypertension, (REVATIO) 20 mg tablet Take 20 mg by mouth as needed. Patient not taking: Reported on 11/1/2021 10/5/21   Provider, Historical       Allergies   Allergen Reactions    Percocet [Oxycodone-Acetaminophen] Itching     Pt has used hydrocodone       Review of Systems:   Consititutional: Denies fever or chills. Eyes:  Denies use of glasses or vision problems(cataracts). ENT:  Denies hearing or swallowing difficulty. CV: Denies CP, claudication. + HTN. Resp: Denies productive cough. + dyspnea  : Denies dialysis or kidney problems. GI: Denies ulcers, esophageal strictures, liver problems. M/S: Denies joint or bone problems,  + rotator cuff surgery bilaterally -- implanted artificial hardware. Skin: Denies varicose veins, edema. Neuro: Denies strokes, or TIAs. Psych: Denies anxiety or depression. Endocrine: Denies thyroid problems or diabetes. Heme/Lymphatic: Denies easy bruising or lymphedema. Objective:     VS:   Visit Vitals  /74 (BP 1 Location: Left upper arm, BP Patient Position: Sitting, BP Cuff Size: Large adult)   Pulse 60   Temp 97.9 °F (36.6 °C) (Oral)   Resp 18   Ht 6' 2\" (1.88 m)   Wt 257 lb 6.4 oz (116.8 kg)   SpO2 98%   BMI 33.05 kg/m²     Physical Exam:    General appearance: alert, cooperative, no distress  Head: normocephalic, without obvious abnormality; atraumatic  Eyes: conjunctivae/corneas clear; EOM's intact. Nose: nares normal; no drainage. Neck: no carotid bruit and no JVD  Lungs: clear to auscultation bilaterally  Heart: regular rate and rhythm; no murmur  Abdomen: soft, non-tender; bowel sounds normal  Extremities: moves all extremities; no weakness. Skin: Skin color normal; No varicose veins or edema.   Neurologic: Grossly normal      Labs: No results for input(s): WBC, HGB, HCT, PLT, NA, K, BUN, CREA, GLU, GLUCPOC, INR, HGBEXT, HCTEXT, PLTEXT, INREXT, HGBEXT, HCTEXT, PLTEXT, INREXT in the last 72 hours. No lab exists for component: GLPOC    Diagnostics:   PA and lateral: will order    Carotid doppler: will order    EKG: will order  Assessment:     Atrial Fibrillation     Plan:       1. Persistent Atrial fibrillation/flutter: s/p CTI ablation x2, R lat ablation x2, PVI 7/6/21. Right atrial scar? Vilma Bacon On Amiodarone and Eliquis. Will need to obtain ECHO prior to surgery. Pt states he does not wish to proceed with surgery at this time. He would like to attempt another ablation with Dr. Daily Bernard and come back to us if it is unsuccessful. 2. PACs: On Diltiazem  3. HTN - not requiring Rx at this time  4. HLD: not on any Rx for this  5. Gout: On allopurinol  6. Obesity: BMI 33.13. Diet and exercise counseling  7. JOSE: mild per pt. Cards rec to repeat and obtain CPAP machine. Signed By: Lucy Peoples NP     November 1, 2021        Addendum:  Pt seen and examined. Images reviewed. Agree with NP Fany note. Discussed history in detail with him including his prior ablations. He would like to proceed with one more attempt at catheter ablation with Dr. Daily Bernard but is open to the epicardial ablation should that fail. We discussed the procedure, the risks and benefits. I discussed with referring physician, Dr. Daily Bernard.

## 2021-11-01 NOTE — PROGRESS NOTES
Chief Complaint   Patient presents with    Irregular Heart Beat     Hybrid ablation consult     1. Have you been to the ER, urgent care clinic since your last visit? Hospitalized since your last visit? No    2. Have you seen or consulted any other health care providers outside of the [address] [place] [address] since your last visit? Include any pap smears or colon screening.  No

## 2021-11-29 RX ORDER — AMIODARONE HYDROCHLORIDE 200 MG/1
TABLET ORAL
Qty: 30 TABLET | Refills: 3 | Status: SHIPPED | OUTPATIENT
Start: 2021-11-29 | End: 2021-12-30

## 2021-11-29 NOTE — TELEPHONE ENCOUNTER
Requested Prescriptions     Signed Prescriptions Disp Refills    amiodarone (CORDARONE) 200 mg tablet 30 Tablet 3     Sig: TAKE ONE TABLET BY MOUTH EVERY DAY     Authorizing Provider: Sandra Ontiveros     Ordering User: Paulette Huang     Last office visit 10/20/21.  Refills per verbal order from Deng Hays NP.

## 2021-12-07 ENCOUNTER — TELEPHONE (OUTPATIENT)
Dept: CARDIOLOGY CLINIC | Age: 66
End: 2021-12-07

## 2021-12-07 NOTE — TELEPHONE ENCOUNTER
Returned patient call, ID verified using two patient identifiers. Patient calling to see if he can reschedule a AFIB ablation with Dr. Amparo Devine. Notified patient that Dr. Amparo Devine is leaving the practice at the end of this month and we are no longer scheduling procedures for him. Informed him that Dr. Anitra Jason at our [facility] office is covering for him during this transition period. Patient states he is unsure how he would like to proceed. Advised him that I will forward to Ursula Obrien NP and call him back with her recommendations. Patient verbalized understanding and will call with any other questions.

## 2021-12-07 NOTE — TELEPHONE ENCOUNTER
Patient would like to speak with the nurse about trying to schedule a procedure with .     [phone]

## 2021-12-08 NOTE — TELEPHONE ENCOUNTER
Called patient, ID verified using two patient identifiers. Notified patient that per Trevon Lantigua NP she would like him to see Dr. Kim Rodriguez at [facility] to discuss a repeat endocardial ablation. Advised him that Dr. Kim Rodriguez and Dr. Harriet Keller work closely together if he decided he wanted to proceed with the hybrid ablation. Patient is agreeable. Advised him that someone from Dr. Gricelda Govea office will be calling him to set up a office visit. Patient verbalized understanding and will call with any other questions.

## 2021-12-13 NOTE — TELEPHONE ENCOUNTER
Requested Prescriptions     Signed Prescriptions Disp Refills    apixaban (Eliquis) 5 mg tablet 60 Tablet 3     Sig: Take 1 Tablet by mouth two (2) times a day. Authorizing Provider: Carri John     Ordering User: Ej Grider     Refill per verbal order JESSICA Steele NP. Received faxed refill request from [facility].

## 2021-12-30 ENCOUNTER — OFFICE VISIT (OUTPATIENT)
Dept: CARDIOLOGY CLINIC | Age: 66
End: 2021-12-30
Payer: COMMERCIAL

## 2021-12-30 VITALS
OXYGEN SATURATION: 98 % | DIASTOLIC BLOOD PRESSURE: 82 MMHG | SYSTOLIC BLOOD PRESSURE: 128 MMHG | WEIGHT: 254.4 LBS | HEIGHT: 74 IN | RESPIRATION RATE: 18 BRPM | HEART RATE: 85 BPM | BODY MASS INDEX: 32.65 KG/M2

## 2021-12-30 DIAGNOSIS — I10 PRIMARY HYPERTENSION: ICD-10-CM

## 2021-12-30 DIAGNOSIS — I48.91 ATRIAL FIBRILLATION, UNSPECIFIED TYPE (HCC): Primary | ICD-10-CM

## 2021-12-30 DIAGNOSIS — I48.4 ATYPICAL ATRIAL FLUTTER (HCC): ICD-10-CM

## 2021-12-30 PROCEDURE — 99205 OFFICE O/P NEW HI 60 MIN: CPT | Performed by: INTERNAL MEDICINE

## 2021-12-30 PROCEDURE — 93000 ELECTROCARDIOGRAM COMPLETE: CPT | Performed by: INTERNAL MEDICINE

## 2021-12-30 NOTE — PROGRESS NOTES
Chief Complaint   Patient presents with    Irregular Heart Beat     Discuss hybrid ablation. Denies cardiac Symptoms     1. Have you been to the ER, urgent care clinic since your last visit? Hospitalized since your last visit? No    2. Have you seen or consulted any other health care providers outside of the [address] [place] [address] since your last visit? Include any pap smears or colon screening.  No

## 2021-12-30 NOTE — LETTER
12/30/2021    Patient: Marilyn Ag   YOB: 1955   Date of Visit: 12/30/2021     [phone]  Via Fax: [phone]    Dear Annette Tapia MD,      Thank you for referring Mr. Petty Kearns to [address] [place] [address] [facility] for evaluation. My notes for this consultation are attached. If you have questions, please do not hesitate to call me. I look forward to following your patient along with you.       Sincerely,    Paulina Arreguin MD

## 2021-12-30 NOTE — PROGRESS NOTES
Subjective:      Klaus Taylor is a 61 y.o. male is here for EP consult. Has sob. Has had AF abl and AFL abl in the past. Failed cardiversion on amio.      Patient Active Problem List    Diagnosis Date Noted    S/P ablation of atrial flutter     Atrial flutter ([address]     High blood pressure     Acute medial meniscus tear, right, initial encounter 11/28/2017    Leukocytosis 04/21/2016    A-fib ([facility] 04/20/2016    Obesity 10/28/2010      Markie Taylor MD  Past Medical History:   Diagnosis Date    Abnormal echocardiogram 10/28/2010    Atrial fibrillation (HCC)     Atrial flutter (HCC)     s/p ablation     Dyslipidemia     Gout     High blood pressure     High blood pressure     Obesity 10/28/2010    Prediabetes     Prostate cancer ([facility]     S/P ablation of atrial flutter     Supraventricular tachycardia ([facility] 10/28/2010      Past Surgical History:   Procedure Laterality Date    HX ACL RECONSTRUCTION  6/2013    menniscus    HX ROTATOR CUFF REPAIR Left 2005    HX ROTATOR CUFF REPAIR Right 2007    HX WISDOM TEETH EXTRACTION      [place] CARDIAC SURG PROCEDURE UNLIST  2016    cardiac ablation    [place] COMPRE ELECTROPHYSIOL XM W/LEFT ATRIAL PACNG/REC N/A 12/22/2020    Lt Atrial Pace & Record During Ep Study performed by Jose Roach MD at [place] [address], [facility] [address] CATH LAB    [place] COMPRE ELECTROPHYSIOL XM W/LEFT ATRIAL PACNG/REC N/A 1/26/2021    Lt Atrial Pace & Record During Ep Study performed by Jose Roach MD at [place] [address], [facility] [address] CATH LAB    [place] EPHYS EVAL W/ABLATION SUPRAVENT ARRHYTHMIA N/A 12/22/2020    ABLATION SVT performed by Jose Roach MD at [facility], [facility] [address] CATH LAB    [place] EPHYS EVAL W/ABLATION SUPRAVENT ARRHYTHMIA N/A 1/7/2021    Ablation A-Flutter performed by Jose Roach MD at [facility], [facility] [address] CATH LAB    [place] EPHYS EVAL W/ABLATION [address] N/A 1/26/2021    Ablation A-Flutter performed by Jose Roach MD at [facility], [facility] [address] CATH LAB    [place] INTRACARD ECHO, THER/DX INTERVENT N/A 1/7/2021 Intracardiac Echocardiogram performed by aMx Ibrahim MD at [address] CATH LAB    [place] INTRACARDIAC ELECTROPHYSIOLOGIC 3D MAPPING N/A 12/22/2020    Ep 3d Mapping performed by Max Ibrahim MD at [address], [facility] CATH LAB    [place] INTRACARDIAC ELECTROPHYSIOLOGIC 3D MAPPING N/A 1/7/2021    Ep 3d Mapping performed by Max Ibrahim MD at [address] CATH LAB    [place] INTRACARDIAC ELECTROPHYSIOLOGIC 3D MAPPING N/A 1/26/2021    Ep 3d Mapping performed by Max Ibrahim MD at [address] CATH LAB     Allergies   Allergen Reactions    Percocet [Oxycodone-Acetaminophen] Itching     Pt has used hydrocodone      Family History   Problem Relation Age of Onset    Atrial Fibrillation Mother     Arrhythmia Mother     No Known Problems Father     Cancer Sister 43        breast    No Known Problems Sister     negative for cardiac disease  Social History     Socioeconomic History    Marital status: SINGLE   Tobacco Use    Smoking status: Never Smoker    Smokeless tobacco: Never Used   Vaping Use    Vaping Use: Never used   Substance and Sexual Activity    Alcohol use: Yes     Alcohol/week: 12.0 - 20.0 standard drinks     Types: 12 - 20 Cans of beer per week     Comment: reports not drinking during the week, only on weekends    Drug use: No     Current Outpatient Medications   Medication Sig    apixaban (Eliquis) 5 mg tablet Take 1 Tablet by mouth two (2) times a day.  dilTIAZem ER (DILACOR XR) 240 mg capsule Take 1 Capsule by mouth daily.  allopurinoL (ZYLOPRIM) 100 mg tablet Take 100 mg by mouth daily. Switched from [address] [place] [address] to allopurinol on 7/17/2020    sildenafiL, pulmonary hypertension, (REVATIO) 20 mg tablet Take 20 mg by mouth as needed. (Patient not taking: Reported on 11/1/2021)     No current facility-administered medications for this visit.       Vitals:    12/30/21 1406   BP: 128/82   Pulse: 85   Resp: 18   SpO2: 98%   Weight: 254 lb 6.4 oz (115.4 kg)   Height: 6' 2\" (1.88 m)       I have reviewed the nurses notes, vitals, problem list, allergy list, medical history, family, social history and medications. Review of Symptoms:    General: Pt denies excessive weight gain or loss. Pt is able to conduct ADL's  HEENT: Denies blurred vision, headaches, hearing loss, epistaxis and difficulty swallowing. Respiratory: +sob, riley Denies cough, congestion, wheezing or stridor. Cardiovascular: Denies precordial pain, palpitations, edema or PND  Gastrointestinal: Denies poor appetite, indigestion, abdominal pain or blood in stool  Genitourinary: Denies hematuria, dysuria, increased urinary frequency  Musculoskeletal: Denies joint pain or swelling from muscles or joints  Neurologic: Denies tremor, paresthesias, headache, or sensory motor disturbance  Psychiatric: Denies confusion, insomnia, depression  Integumentray: Denies rash, itching or ulcers. Hematologic: Denies easy bruising, bleeding    Physical Exam:      General: Well developed, in no acute distress. HEENT: Eyes - PERRL, no jvd  Heart:  Normal S1/S2 negative S3 or S4. irr irr, no murmur, gallop or rub. Respiratory: Clear bilaterally x 4, no wheezing or rales  Abdomen:   Soft, non-tender, bowel sounds are active. Extremities:  No edema, normal cap refill, no cyanosis. Musculoskeletal: No clubbing  Neuro: A&Ox3, speech clear, gait stable. Skin: Skin color is normal. No rashes or lesions.  Non diaphoretic, no ulcers or subcutaneous nodule  Vascular: 2+ pulses symmetric in all extremities  Psych - judgement intact and orientation is wnl     Cardiographics    Ekg: afib    Results for orders placed or performed during the hospital encounter of 01/07/21   EKG, 12 LEAD, INITIAL   Result Value Ref Range    Ventricular Rate 150 BPM    Atrial Rate 150 BPM    P-R Interval 136 ms    QRS Duration 92 ms    Q-T Interval 334 ms    QTC Calculation (Bezet) 527 ms    Calculated R Axis 47 degrees    Calculated T Axis 103 degrees    Diagnosis       Sinus tachycardia with frequent premature ventricular complexes and fusion   complexes  Nonspecific ST and T wave abnormality  When compared with ECG of 20-NOV-2017 10:16,  fusion complexes are now present  premature ventricular complexes are now present  Vent. rate has increased BY  90 BPM  ST now depressed in Anterior leads  T wave inversion now evident in Anterior leads  Confirmed by Mihir Herrera M.D., Daylin Craig ([id number]) on 1/7/2021 8:52:44 AM           Lab Results   Component Value Date/Time    WBC 7.0 09/13/2021 10:07 AM    HGB 16.2 09/13/2021 10:07 AM    HCT 49.8 09/13/2021 10:07 AM    PLATELET 747 12/04/0651 10:07 AM    MCV 82.3 09/13/2021 10:07 AM      Lab Results   Component Value Date/Time    Sodium 138 09/13/2021 10:07 AM    Potassium 4.0 09/13/2021 10:07 AM    Chloride 108 09/13/2021 10:07 AM    CO2 27 09/13/2021 10:07 AM    Anion gap 3 (L) 09/13/2021 10:07 AM    Glucose 123 (H) 09/13/2021 10:07 AM    BUN 10 09/13/2021 10:07 AM    Creatinine 0.98 09/13/2021 10:07 AM    BUN/Creatinine ratio 10 (L) 09/13/2021 10:07 AM    GFR est AA >60 09/13/2021 10:07 AM    GFR est non-AA >60 09/13/2021 10:07 AM    Calcium 8.6 09/13/2021 10:07 AM    Bilirubin, total 0.6 06/30/2021 03:34 PM    Alk. phosphatase 58 06/30/2021 03:34 PM    Protein, total 6.9 06/30/2021 03:34 PM    Albumin 4.4 06/30/2021 03:34 PM    Globulin 3.5 01/07/2021 07:52 AM    A-G Ratio 1.8 06/30/2021 03:34 PM    ALT (SGPT) 16 06/30/2021 03:34 PM         Assessment:     Assessment:        ICD-10-CM ICD-9-CM    1. Atrial fibrillation, unspecified type ([address]  I48.91 427.31 AMB POC EKG ROUTINE W/ 12 LEADS, INTER & REP   2. Atypical atrial flutter (HCC)  I48.4 427.32    3. Primary hypertension  I10 401.9      Orders Placed This Encounter    AMB POC EKG ROUTINE W/ 12 LEADS, INTER & REP     Order Specific Question:   Reason for Exam:     Answer:   routine        Plan:   Randolph Farr is a pleasant gentleman with symptomatic AF sp AF abl, cardioversion on amio.  I will dc his amio secondary to his age and him being in AF despite amio. Karen Davis is a candidate for a hybrid afib ablation. I discussed the risks/benefits/alternatives of the procedure with the patient. Risks include (but are not limited to) bleeding, heart block, infection, cva/mi/tamponade/esophageal perforation/pv stenosis/death. The patient understands that there is a 6-0% major complication rate. He has seen Dr Oj Collins already. He will consider it. Stable and compliant with oac. Thank you for this interesting consultation. Thank you for allowing me to participate in Karen Davis 's care.     Marlon Woodruff MD, Valentina Lopez

## 2022-01-03 RX ORDER — DILTIAZEM HYDROCHLORIDE 240 MG/1
240 CAPSULE, EXTENDED RELEASE ORAL DAILY
Qty: 90 CAPSULE | Refills: 1 | Status: SHIPPED | OUTPATIENT
Start: 2022-01-03

## 2022-01-03 NOTE — TELEPHONE ENCOUNTER
Requested Prescriptions     Signed Prescriptions Disp Refills    dilTIAZem ER (DILACOR XR) 240 mg capsule 90 Capsule 1     Sig: Take 1 Capsule by mouth daily. Authorizing Provider: Lisa Morton     Ordering User: Dario Fontanez     Refill per verbal order JESSICA Radford NP.    Last visit:10/20/21  Next visit: not scheduled

## 2022-03-19 PROBLEM — S83.241A ACUTE MEDIAL MENISCUS TEAR, RIGHT, INITIAL ENCOUNTER: Status: ACTIVE | Noted: 2017-11-28

## 2022-07-14 ENCOUNTER — OFFICE VISIT (OUTPATIENT)
Dept: CARDIOTHORACIC SURGERY | Age: 67
End: 2022-07-14
Payer: COMMERCIAL

## 2022-07-14 VITALS
TEMPERATURE: 97.9 F | WEIGHT: 258 LBS | RESPIRATION RATE: 16 BRPM | BODY MASS INDEX: 33.13 KG/M2 | DIASTOLIC BLOOD PRESSURE: 62 MMHG | SYSTOLIC BLOOD PRESSURE: 132 MMHG | OXYGEN SATURATION: 97 % | HEART RATE: 69 BPM

## 2022-07-14 DIAGNOSIS — I48.4 ATYPICAL ATRIAL FLUTTER (HCC): ICD-10-CM

## 2022-07-14 DIAGNOSIS — Z86.79 S/P ABLATION OF ATRIAL FLUTTER: ICD-10-CM

## 2022-07-14 DIAGNOSIS — Z98.890 S/P ABLATION OF ATRIAL FLUTTER: ICD-10-CM

## 2022-07-14 DIAGNOSIS — I48.91 ATRIAL FIBRILLATION, UNSPECIFIED TYPE (HCC): Primary | ICD-10-CM

## 2022-07-14 PROCEDURE — 99214 OFFICE O/P EST MOD 30 MIN: CPT | Performed by: NURSE PRACTITIONER

## 2022-07-14 PROCEDURE — 1123F ACP DISCUSS/DSCN MKR DOCD: CPT | Performed by: NURSE PRACTITIONER

## 2022-07-14 NOTE — PROGRESS NOTES
[facility] Office Consult Follow up    Subjective:    ** This information was obtained from a previous visit with updated included:  Clive Pearce is a 74 y.o. male who was referred for cardiac evaluation atrial fibrillation. Pt has a significant PMH of HTN, afib, HLD, Gout, and obesity. Pt was diagnosed with Afib 6 months ago, previously atrial flutter - treated 4-5 yrs ago. He has been on Eliquis since last year and is tolerating well without bleeding. Very symptomatic with dyspnea on exertion, he is normally a very active person and is no longer able to do these activities without needing to rest. He is frustrated with his limitations. No surgeries to chest.     Pt lives alone, but has someone who would be able to assist him if needed. He works as a contractor, he drives most of the day. He gets around independently without assist devices. He is a nonsmoker, drinks 12-20 cans of beer on the weekends (3/4 weekends a month), and denies illicit drug use. Pt has a significant family history of his mother with atrial fibrillation. He had the J[employer]J COVID-19 vaccine in August 2021. Pt wished to attempt one more ablation with Dr. Madelyn Cooks, unfortunately with his leaving he was unable to see Dr. Madelyn Cooks. Pt followed up with Dr. Dave Nathan who encouraged pt to go forward with the route of hybrid ablation. Pts amiodarone was also stopped d/t no benefit. The patient wished to have a second opinion and saw Dr. Lio Bender at [facility]. Pt underwent PVI on 4/13/22. Pt had his postop FU with [facility] - holter monitor was ordered. Pt found with Tachy-liudmila. Denies dizziness, syncope. Holter also notes 3% PVC burden. It was recommended that he get a PPM, pt does not want a PPM at this time. He is currently in rate controlled afib during this office visit. His present symptoms include: being tired/HERRERA after hauling stuff around the beach. He does endorse having to take breaks while digging, but denies with regular activity.   Denies SOB.     Ablation history:   12/22/20: Atrial flutter ablation  1/2/21: Atypical atrial flutter ablation  1/26/21: AT ablation  6/2/21: [facility]  7/6/21: PVI + CTI  4/13/22 (most recent): PVI with RF for A. Fib     Cardiac Testing:  ECHO: Will order to assess prior to surgery    Past Medical History:   Diagnosis Date    Abnormal echocardiogram 10/28/2010    Atrial fibrillation (HCC)     Atrial flutter (HCC)     s/p ablation     Dyslipidemia     Gout     High blood pressure     High blood pressure     Obesity 10/28/2010    Prediabetes     Pro[facility] cancer ([facility] [address]     S/P ablation of atrial flutter     Supraventricular tachycardia ([facility] [address] 10/28/2010     Past Surgical History:   Procedure Laterality Date    HX ACL RECONSTRUCTION  6/2013    menniscus    HX ROTATOR CUFF REPAIR Left 2005    HX ROTATOR CUFF REPAIR Right 2007    HX WISDOM TEETH EXTRACTION      [facility]ED[facility]LIST  2016    cardiac ablation    [facility] COMPRE ELECTROPHYSIOL XM W/LEFT ATRIAL PACNG/REC N/A 12/22/2020    Lt Atrial Pace & Record During Ep Study performed by Musa Avilez MD at [place] [address], [facility] Dr CATH LAB    [facility] COMPRE ELECTROPHYSIOL XM W/LEFT ATRIAL PACNG/REC N/A 1/26/2021    Lt Atrial Pace & Record During Ep Study performed by Musa Avilez MD at [place] [address], [facility] CATH LAB    [facility] COMPRE EP EVAL ABLTJ 3D MAPG TX SVT N/A 12/22/2020    ABLATION SVT performed by Musa Avilez MD at [place] [address], [facility] [address] CATH LAB    [facility] COMPRE EP EVAL ABLTJ 3D MAPG TX SVT N/A 1/7/2021    Ablation A-Flutter performed by Musa Avilez MD at [place] [address], [facility] [address] CATH LAB    [facility] COMPRE EP EVAL ABLTJ 3D MAPG TX SVT N/A 1/26/2021    Ablation A-Flutter performed by Musa Avilez MD at [place] [address], [facility] CATH LAB    [facility] INTRACARD ECHO, THER/DX INTERVENT N/A 1/7/2021    Intracardiac Echocardiogram performed by Musa Avilez MD at [place] [address], [facility] CATH LAB    [facility] INTRACARDIAC ELECTROPHYSIOLOGIC 3D MAPPING N/A 12/22/2020    Ep 3d Mapping performed by Musa Avilez MD at [place] [address], [facility]  CATH LAB    [facility] INTRACARDIAC ELECTROPHYSIOLOGIC 3D MAPPING N/A 1/7/2021    Ep 3d Mapping performed by Cezar Hoko MD at [address], [facility]/[address] CATH LAB    [place] INTRACARDIAC ELECTROPHYSIOLOGIC 3D MAPPING N/A 1/26/2021    Ep 3d Mapping performed by Cezar Hook MD at [address], [facility] CATH LAB      Social History     Tobacco Use    Smoking status: Never Smoker    Smokeless tobacco: Never Used   Substance Use Topics    Alcohol use: Yes     Alcohol/week: 12.0 - 20.0 standard drinks     Types: 12 - 20 Cans of beer per week     Comment: reports not drinking during the week, only on weekends      Family History   Problem Relation Age of Onset    Atrial Fibrillation Mother     Arrhythmia Mother     No Known Problems Father     Cancer Sister 43        breast    No Known Problems Sister      Prior to Admission medications    Medication Sig Start Date End Date Taking? Authorizing Provider   dilTIAZem ER (DILACOR XR) 240 mg capsule Take 1 Capsule by mouth daily. 1/3/22  Yes Chen Pillai NP   apixaban (Eliquis) 5 mg tablet Take 1 Tablet by mouth two (2) times a day. 12/13/21  Yes Chen Pillai NP   allopurinoL (ZYLOPRIM) 100 mg tablet Take 100 mg by mouth daily. Switched from [address] [place] [address] to allopurinol on 7/17/2020   Yes Provider, Historical   sildenafiL, pulmonary hypertension, (REVATIO) 20 mg tablet Take 20 mg by mouth as needed. Patient not taking: Reported on 11/1/2021 10/5/21 7/14/22  Provider, Historical       Allergies   Allergen Reactions    Percocet [Oxycodone-Acetaminophen] Itching     Pt has used hydrocodone       Review of Systems:   Consititutional: Denies fever or chills. Eyes:  Denies use of glasses or vision problems(cataracts). ENT:  Denies hearing or swallowing difficulty. CV: Denies CP, claudication. + HTN. Resp: Denies productive cough. + dyspnea  : Denies dialysis or kidney problems. GI: Denies ulcers, esophageal strictures, liver problems.   M/S: Denies joint or bone problems,  + rotator cuff surgery bilaterally -- implanted artificial hardware. Skin: Denies varicose veins, edema. Neuro: Denies strokes, or TIAs. Psych: Denies anxiety or depression. Endocrine: Denies thyroid problems or diabetes. Heme/Lymphatic: Denies easy bruising or lymphedema. Objective:     VS:   Visit Vitals  /62 (BP 1 Location: Right upper arm)   Pulse 69   Temp 97.9 °F (36.6 °C)   Resp 16   Wt 258 lb (117 kg)   SpO2 97%   BMI 33.13 kg/m²       Physical Exam:    General appearance: alert, cooperative, no distress  Head: normocephalic, without obvious abnormality; atraumatic  Eyes: conjunctivae/corneas clear; EOM's intact. Nose: nares normal; no drainage. Neck: no carotid bruit and no JVD  Lungs: clear to auscultation bilaterally  Heart: regular rate and rhythm; no murmur  Abdomen: soft, non-tender; bowel sounds normal  Extremities: moves all extremities; no weakness. Skin: Skin color normal; No varicose veins or edema. Neurologic: Grossly normal      Labs: No results for input(s): WBC, HGB, HCT, PLT, NA, K, BUN, CREA, GLU, GLUCPOC, INR, HGBEXT, HCTEXT, PLTEXT, INREXT, HGBEXT, HCTEXT, PLTEXT, INREXT in the last 72 hours. No lab exists for component: GLPOC    Assessment:     Atrial Fibrillation     Plan:       1. Persistent Atrial fibrillation/flutter: s/p CTI ablation x2, R lat ablation x2, PVI 7/6/21. Right atrial scar? . On Dilt and Eliquis. Off Amio as of Dec '21. Will obtain ECHO then have pt FU with Dr. Reina Plummer.   2. PACs: On Diltiazem. 3% burden seen on holter monitor. 3. HTN: not requiring Rx at this time  4. HLD: not on any Rx for this  5. Gout: On allopurinol  6. Obesity: BMI 33.13. Diet and exercise counseling  7. JOSE: mild per pt. Cards rec to repeat and obtain CPAP machine. Signed By: Pawan Sinclair NP     July 14, 2022      Addendum: Pt seen and examined. Agree with NP Fany note.  I would recommend him be seen by Dr. Reina Plummer for final determination of repeat ablation (hybrid this time) vs dustin ablation and PPM. Dr. Miah Mcbride ablation from 3 months ago did include a posterior wall isolation, and this was Mr. Glenis Doyle 3rd ablation. I am hesitant that a 4th ablation will provide much improvement especially when the posterior wall has already been isolated, but will defer to Dr. Jannette Jefferson expertise once he reviews the procedural note and recent Holter monitor. We will also arrange for him to get a repeat TTE per Dr. Jannette Jefferson suggestion. I explained this to the patient and he understood.

## 2022-07-28 ENCOUNTER — HOSPITAL ENCOUNTER (OUTPATIENT)
Dept: NON INVASIVE DIAGNOSTICS | Age: 67
Discharge: HOME | End: 2022-07-28
Attending: NURSE PRACTITIONER
Payer: COMMERCIAL

## 2022-07-28 VITALS
HEIGHT: 74 IN | WEIGHT: 257.94 LBS | SYSTOLIC BLOOD PRESSURE: 152 MMHG | BODY MASS INDEX: 33.1 KG/M2 | DIASTOLIC BLOOD PRESSURE: 97 MMHG

## 2022-07-28 DIAGNOSIS — I48.91 ATRIAL FIBRILLATION, UNSPECIFIED TYPE (HCC): ICD-10-CM

## 2022-07-28 LAB
ECHO LV EJECTION FRACTION A2C: 25 %
ECHO LV EJECTION FRACTION A4C: 15 %
ECHO LVOT CARDIAC OUTPUT: 2.8 LITER/MINUTE
ECHO LVOT CARDIAC OUTPUT: 2.8 LITER/MINUTE
ECHO LVOT CARDIAC OUTPUT: 3.1 LITER/MINUTE
ECHO LVOT CARDIAC OUTPUT: 3.1 LITER/MINUTE
ECHO LVOT CARDIAC OUTPUT: 3.5 LITER/MINUTE
ECHO LVOT CARDIAC OUTPUT: 3.5 LITER/MINUTE
ECHO RV INTERNAL DIMENSION: 6 CM

## 2022-07-28 PROCEDURE — 93306 TTE W/DOPPLER COMPLETE: CPT | Performed by: SPECIALIST

## 2022-07-28 PROCEDURE — 93306 TTE W/DOPPLER COMPLETE: CPT

## 2023-04-18 ENCOUNTER — TELEPHONE (OUTPATIENT)
Dept: CARDIOTHORACIC SURGERY | Age: 68
End: 2023-04-18

## 2023-04-18 NOTE — TELEPHONE ENCOUNTER
I called patient to follow up with office visit he had with Dr. Tammy Velasco in 7/2022. He was supposed to follow up with Dr. Louann Schulte following his echo. He did not go for an appointment. Several attempts were made by Dr. Marciano Eckert office to schedule an appointment. Patient states that he ended up following up with Dr. Verona Cantu at [facility] and states she was able to get \"everything straight\". Patient thanked me for the call and will continue with Dr. Verona Cantu.

## 2023-05-20 RX ORDER — ALLOPURINOL 100 MG/1
100 TABLET ORAL DAILY
COMMUNITY

## 2023-05-20 RX ORDER — DILTIAZEM HYDROCHLORIDE 240 MG/1
240 CAPSULE, EXTENDED RELEASE ORAL DAILY
COMMUNITY
Start: 2022-01-03

## 2023-10-31 ENCOUNTER — ANESTHESIA EVENT (OUTPATIENT)
Facility: HOSPITAL | Age: 68
End: 2023-10-31
Payer: MEDICARE

## 2023-10-31 ENCOUNTER — HOSPITAL ENCOUNTER (OUTPATIENT)
Facility: HOSPITAL | Age: 68
Setting detail: OUTPATIENT SURGERY
Discharge: HOME | End: 2023-10-31
Attending: INTERNAL MEDICINE | Admitting: INTERNAL MEDICINE
Payer: MEDICARE

## 2023-10-31 ENCOUNTER — ANESTHESIA (OUTPATIENT)
Facility: HOSPITAL | Age: 68
End: 2023-10-31
Payer: MEDICARE

## 2023-10-31 VITALS
HEIGHT: 74 IN | TEMPERATURE: 98.3 F | SYSTOLIC BLOOD PRESSURE: 129 MMHG | DIASTOLIC BLOOD PRESSURE: 78 MMHG | WEIGHT: 251.99 LBS | BODY MASS INDEX: 32.34 KG/M2 | HEART RATE: 61 BPM | RESPIRATION RATE: 16 BRPM | OXYGEN SATURATION: 96 %

## 2023-10-31 PROCEDURE — 3600007512: Performed by: INTERNAL MEDICINE

## 2023-10-31 PROCEDURE — 2500000003 HC RX 250 WO HCPCS: Performed by: NURSE ANESTHETIST, CERTIFIED REGISTERED

## 2023-10-31 PROCEDURE — 7100000010 HC PHASE II RECOVERY - FIRST 15 MIN: Performed by: INTERNAL MEDICINE

## 2023-10-31 PROCEDURE — 3600007502: Performed by: INTERNAL MEDICINE

## 2023-10-31 PROCEDURE — 6360000002 HC RX W HCPCS: Performed by: NURSE ANESTHETIST, CERTIFIED REGISTERED

## 2023-10-31 PROCEDURE — 2580000003 HC RX 258: Performed by: INTERNAL MEDICINE

## 2023-10-31 PROCEDURE — 3700000000 HC ANESTHESIA ATTENDED CARE: Performed by: INTERNAL MEDICINE

## 2023-10-31 PROCEDURE — 7100000011 HC PHASE II RECOVERY - ADDTL 15 MIN: Performed by: INTERNAL MEDICINE

## 2023-10-31 PROCEDURE — 3700000001 HC ADD 15 MINUTES (ANESTHESIA): Performed by: INTERNAL MEDICINE

## 2023-10-31 RX ORDER — AMIODARONE HYDROCHLORIDE 200 MG/1
200 TABLET ORAL DAILY
COMMUNITY
Start: 2023-10-03

## 2023-10-31 RX ORDER — CARVEDILOL 3.12 MG/1
3.12 TABLET ORAL 2 TIMES DAILY WITH MEALS
Qty: 60 TABLET | Refills: 11 | COMMUNITY
Start: 2023-02-20 | End: 2024-02-20

## 2023-10-31 RX ORDER — SODIUM CHLORIDE 9 MG/ML
25 INJECTION, SOLUTION INTRAVENOUS PRN
Status: DISCONTINUED | OUTPATIENT
Start: 2023-10-31 | End: 2023-10-31 | Stop reason: HOSPADM

## 2023-10-31 RX ORDER — LIDOCAINE HYDROCHLORIDE 20 MG/ML
INJECTION, SOLUTION EPIDURAL; INFILTRATION; INTRACAUDAL; PERINEURAL PRN
Status: DISCONTINUED | OUTPATIENT
Start: 2023-10-31 | End: 2023-10-31 | Stop reason: SDUPTHER

## 2023-10-31 RX ORDER — PROPOFOL 10 MG/ML
INJECTION, EMULSION INTRAVENOUS PRN
Status: DISCONTINUED | OUTPATIENT
Start: 2023-10-31 | End: 2023-10-31 | Stop reason: SDUPTHER

## 2023-10-31 RX ORDER — SPIRONOLACTONE 25 MG/1
25 TABLET ORAL DAILY
Qty: 30 TABLET | Refills: 11 | COMMUNITY
Start: 2023-04-04 | End: 2024-04-03

## 2023-10-31 RX ORDER — SODIUM CHLORIDE 0.9 % (FLUSH) 0.9 %
5-40 SYRINGE (ML) INJECTION EVERY 12 HOURS SCHEDULED
Status: DISCONTINUED | OUTPATIENT
Start: 2023-10-31 | End: 2023-10-31 | Stop reason: HOSPADM

## 2023-10-31 RX ORDER — SODIUM CHLORIDE 0.9 % (FLUSH) 0.9 %
5-40 SYRINGE (ML) INJECTION PRN
Status: DISCONTINUED | OUTPATIENT
Start: 2023-10-31 | End: 2023-10-31 | Stop reason: HOSPADM

## 2023-10-31 RX ORDER — SACUBITRIL AND VALSARTAN 24; 26 MG/1; MG/1
1 TABLET, FILM COATED ORAL 2 TIMES DAILY
COMMUNITY
Start: 2023-08-30

## 2023-10-31 RX ADMIN — PROPOFOL 50 MG: 10 INJECTION, EMULSION INTRAVENOUS at 13:57

## 2023-10-31 RX ADMIN — LIDOCAINE HYDROCHLORIDE 100 MG: 20 INJECTION, SOLUTION EPIDURAL; INFILTRATION; INTRACAUDAL at 13:54

## 2023-10-31 RX ADMIN — PROPOFOL 100 MG: 10 INJECTION, EMULSION INTRAVENOUS at 13:54

## 2023-10-31 RX ADMIN — PROPOFOL 120 MCG/KG/MIN: 10 INJECTION, EMULSION INTRAVENOUS at 13:55

## 2023-10-31 RX ADMIN — SODIUM CHLORIDE: 9 INJECTION, SOLUTION INTRAVENOUS at 13:50

## 2023-10-31 ASSESSMENT — PAIN - FUNCTIONAL ASSESSMENT: PAIN_FUNCTIONAL_ASSESSMENT: 0-10

## 2023-10-31 ASSESSMENT — PAIN SCALES - GENERAL
PAINLEVEL_OUTOF10: 0

## 2023-10-31 NOTE — PROGRESS NOTES
Alexi Pettit  1955  [id number]    Situation:  Verbal report received from: PAUL Beckham   Procedure: Procedure(s):  COLONOSCOPY    Background:    Preoperative diagnosis: Screen for colon cancer [Z12.11]  Postoperative diagnosis: * No post-op diagnosis entered *    :  Dr. Wilfrid Bynum   Assistant(s): Circulator: Get Harper RN  Endoscopy Technician: Pamela Way    Specimens: * No specimens in log *  H. Pylori    no    Assessment:  Intra-procedure medications     Anesthesia gave intra-procedure sedation and medications, see anesthesia flow sheet   yes    Intravenous fluids: NS@ KVO     Vital signs stable   yes    Abdominal assessment: round and soft   yes    Recommendation:  Discharge patient per MD order  yes.   Return to floor  outpatient  Family or Friend   family  Permission to share finding with family or friend   yes
Endoscopy discharge instructions have been reviewed and given to patient. The patient verbalized understanding and acceptance of instructions. Dr. Elvis Narayanan discussed with patient procedure findings and next steps.
Yes...

## 2023-10-31 NOTE — DISCHARGE INSTRUCTIONS
[address] [facility]  [facility]. Juan F Tejeda M.D.  [phone]            COLON DISCHARGE INSTRUCTIONS       10/31/2023    Maddie Bradford  :  1955  Diann Medical Record Number:  [id number]      COLONOSCOPY FINDINGS:  Your colonoscopy showed: see below    DISCOMFORT:  Redness at IV site- apply warm compress to area; if redness or soreness persist- contact your physician  There may be a slight amount of blood passed from the rectum  Gaseous discomfort- walking, belching will help relieve any discomfort  You may not operate a vehicle for 12 hours  You may not engage in an occupation involving machinery or appliances for rest of today  You may not drink alcoholic beverages for at least 12 hours  Avoid making any critical decisions for at least 24 hour  DIET:   Regular diet   - however -  remember your colon is empty and a heavy meal will produce gas. Avoid these foods:  vegetables, fried / greasy foods, carbonated drinks for today     ACTIVITY:  You may resume your normal daily activities it is recommended that you spend the remainder of the day resting -  avoid any strenuous activity. CALL MTIGRE ANY SIGN OF:   Increasing pain, nausea, vomiting  Abdominal distension (swelling)  New increased bleeding (oral or rectal)  Fever (chills)  Pain in chest area  Bloody discharge from nose or mouth   Shortness of breath    Follow-up Instructions:  Call Dr. Juan F Tejeda at [phone] if you have any questions or problems. Biopsy results will be available in about 14 days. If you have not heard about your results within 2-3 weeks, please call the office. Colonoscopy findings:  Impression:    -Mild to moderate left sided diverticulosis. -Tattoo in ascending colon. Unremarkable. Recommendations:  -Repeat colonoscopy in 5 years. - Regular diet.    -Resume normal medication(s). It is okay to resume apixaban (Eliquis) today.
no

## 2023-10-31 NOTE — PERIOP NOTE
Initial RN admission and assessment performed and documented in Endoscopy navigator. Patient evaluated by anesthesia in pre-procedure holding. All procedural vital signs, airway assessment, and level of consciousness information monitored and recorded by anesthesia staff on the anesthesia record. Report received from [address] [place] [address] post procedure. Patient transported to recovery area by RN.     Report given to post procedure RNDale was pre-cleaned at bedside immediately following procedure by Alanna Murillo.

## 2023-10-31 NOTE — OP NOTE
[address] [place] [address]. Isabel Chisholm M.D.  [phone]            10/31/2023          Colonoscopy Operative Report  Deana Cabello  :  1955  Diann Medical Record Number:  [id number]      Indications:  Personal history of colon polyps    :  Tanesha Tripathi MD    Referring Provider: Remi Loredo MD    Sedation:  MAC    Pre-Procedural Exam:      Airway: clear,  No airway problems anticipated  Heart: RRR, without gallops or rubs  Lungs: clear bilaterally without wheezes, crackles, or rhonchi  Abdomen: soft, nontender, nondistended, bowel sounds present  Mental Status: awake, alert and oriented to person, place and time     Procedure Details:  After informed consent was obtained with all risks and benefits of procedure explained and preoperative exam completed, the patient was taken to the endoscopy suite and placed in the left lateral decubitus position. Upon sequential sedation as per above, a digital rectal exam was performed. The Olympus videocolonoscope  was inserted in the rectum and carefully advanced to the cecum, which was identified by the ileocecal valve and appendiceal orifice, terminal ileum. The quality of preparation was good. The colonoscope was slowly withdrawn with careful inspection and evaluation between folds. Retroflexion in the rectum was performed. The patient tolerated the procedure well. Findings:   Terminal Ileum: Normal  Cecum: Normal  Ascending Colon: Tattoo noted and area was unremarkable. Transverse Colon: Normal  Descending and Sigmoid Colon:  Mild to moderate diverticulosis with small and medium mouthed diverticuli without evidence of diverticulitis or bleeding. Rectum:  Normal on antegrade and retroflexed views. Multiple pull throughs were done through the cecum and ascending colon. Interventions:  None    Specimen Removed: None    Complications: None. EBL:  None.     Impression:    -Mild to moderate left sided

## 2023-10-31 NOTE — ANESTHESIA POSTPROCEDURE EVALUATION
Department of Anesthesiology  Postprocedure Note    Patient: Sander Soto  MRN: [id number]  YOB: 1955  Date of evaluation: 10/31/2023      Procedure Summary     Date: 10/31/23 Room / Location: [facility] 02 / [facility] ENDOSCOPY    Anesthesia Start: 1350 Anesthesia Stop: 5139    Procedure: COLONOSCOPY (Lower GI Region) Diagnosis:       Screen for colon cancer      (Screen for colon cancer [Z12.11])    Surgeons: Varsha Oh MD Responsible Provider: Omer Eddy MD    Anesthesia Type: MAC ASA Status: 3          Anesthesia Type: MAC    Clarisa Phase I: Clarisa Score: 10    Clarisa Phase II: Clarisa Score: 9      Anesthesia Post Evaluation    Patient location during evaluation: PACU  Patient participation: complete - patient participated  Level of consciousness: awake  Pain score: 0  Airway patency: patent  Nausea & Vomiting: no nausea and no vomiting  Complications: no  Cardiovascular status: blood pressure returned to baseline  Respiratory status: acceptable  Hydration status: euvolemic  Multimodal analgesia pain management approach  Pain management: adequate

## 2023-10-31 NOTE — H&P
Edie Hester M.D.  [phone]    History and Physical       NAME:  Mick Chamberlain   :   1955   MRN:   [id number]       Consult Date: 10/31/2023 1:21 PM    History of Present Illness:    Had polyps in 2018. Here for surveillance.      PMH:  Past Medical History:   Diagnosis Date    Abnormal echocardiogram 10/28/2010    Atrial fibrillation (HCC)     Atrial flutter (HCC)     s/p ablation     Dyslipidemia     Gout     High blood pressure     High blood pressure     Obesity 10/28/2010    Prediabetes     Prostate cancer ([address])     S/P ablation of atrial flutter     Supraventricular tachycardia 10/28/2010       PSH:  Past Surgical History:   Procedure Laterality Date    ANTERIOR CRUCIATE LIGAMENT REPAIR  2013    menniscus    CARDIAC SURGERY N/A 2020    ABLATION SVT performed by Cuong Alexis MD at [facility] CATH LAB    CARDIAC SURGERY N/A 2021    Ablation A-Flutter performed by Cuong Alexis MD at [facility] CATH LAB    CARDIAC SURGERY N/A 2021    Ablation A-Flutter performed by Cuong Alexis MD at [address] [place] [address] CATH LAB    COMPRE ELECTROPHYSIOL XM W/LEFT ATRIAL PACNG/REC N/A 2020    Lt Atrial Pace & Record During Ep Study performed by Cuong Alexis MD at [facility] CATH LAB    COMPRE ELECTROPHYSIOL XM W/LEFT ATRIAL PACNG/REC N/A 2021    Lt Atrial Pace & Record During Ep Study performed by Cuong Alexis MD at [address] [place] [address] CATH LAB    INTRACARD ECHO, THER/DX INTERVENT N/A 2021    Intracardiac Echocardiogram performed by Cuong Alexis MD at [address] [place] [address] CATH LAB    INTRACARDIAC ELECTROPHYSIOLOGIC 3D MAPPING N/A 2020    Ep 3d Mapping performed by Cuong Alexis MD at [facility] CATH LAB    INTRACARDIAC ELECTROPHYSIOLOGIC 3D MAPPING N/A 2021    Ep 3d Mapping performed by Cuong Alexis MD at [facility] CATH LAB    INTRACARDIAC ELECTROPHYSIOLOGIC 3D MAPPING N/A 2021    Ep 3d Mapping performed by Cuong Alexis MD at [address] [place] [address]

## 2024-05-27 NOTE — PERIOP NOTES
Pt presents with concerns of wound on left toe after a pedicure approx 20 days ago. Pt was prescribed an antibiotic. Pt has developed hives across upper body and face.          Pt reports itching with percocet, has taken hydrocodone prior to admission without side effects.

## 2025-02-20 NOTE — TELEPHONE ENCOUNTER
[facility]  Medical Surgical Unit  Discharge Note  Short Stay    Procedure(s) (LRB):  CHOLECYSTECTOMY, LAPAROSCOPIC, WITH CHOLANGIOGRAM (N/A)      OUTCOME: Patient tolerated treatment/procedure well without complication and is now ready for discharge.    DISPOSITION: Home or Self Care    FINAL DIAGNOSIS:       * Calculus of gallbladder with other cholecystitis without obstruction [K80.18]  CHOLECYSTECTOMY, LAPAROSCOPIC, WITH CHOLANGIOGRAM (N/A)   Normal intraoperative Cholangiogram  FOLLOWUP: In clinic 1 week    DISCHARGE INSTRUCTIONS:  No discharge procedures on file.     TIME SPENT ON DISCHARGE:  5 minutes   Returned patient call, ID verified using two patient identifiers. Patient states he wasn't told how his procedure with Dr. Arnoldo Simpson went yesterday. He wants to know if it worked. Informed patient that per Dr. Arnoldo Simpson notes it was a successful FREDRICK/Cardioversion. Patient to follow up in 2 weeks. Informed of appointment with [facility] - I NP on 6/17/21 @ 10 am. Patient verbalizes understanding of all information.      Future Appointments   Date Time Provider Marizol Padillai   6/17/2021 10:00 AM AKILAH TuckerSF BS AMB   6/23/2021 12:30 PM NUCLEARANNA BS AMB   6/23/2021  3:00 PM VASCULAR, ANNA LOPEZ BS AMB   8/18/2021  3:40 PM George De La Rosa MD CAVSF BS AMB

## (undated) DEVICE — PERCLOSE PROGLIDE™ SUTURE-MEDIATED CLOSURE SYSTEM: Brand: PERCLOSE PROGLIDE™

## (undated) DEVICE — PACK PROCEDURE SURG HRT CATH

## (undated) DEVICE — PINNACLE INTRODUCER SHEATH: Brand: PINNACLE

## (undated) DEVICE — TUBE SET IRR PUMP THERMALCOOL -- SMARTABLATE

## (undated) DEVICE — DRAPE,EXTREMITY,89X128,STERILE: Brand: MEDLINE

## (undated) DEVICE — CABLE CATH L10FT BLU CONN 10-34 PIN ELECTROGRAM CONDUCTION

## (undated) DEVICE — Device: Brand: PROTRACK PIGTAIL WIRE

## (undated) DEVICE — Device: Brand: RFP-100A CONNECTOR CABLE

## (undated) DEVICE — SHTH GUID 8.5F 17MM SM CRV -- CARTO VIZIGO

## (undated) DEVICE — PRESSURE MONITORING SET: Brand: TRUWAVE

## (undated) DEVICE — CATHETER US 8FR L90CM GRN TIP OVERLAY FOR GE-VIVID I VIVID

## (undated) DEVICE — Device: Brand: NRG TRANSSEPTAL NEEDLE

## (undated) DEVICE — DEVON™ KNEE AND BODY STRAP 60" X 3" (1.5 M X 7.6 CM): Brand: DEVON

## (undated) DEVICE — GAUZE SPONGES,12 PLY: Brand: CURITY

## (undated) DEVICE — COVER CATH ACUNAV ULTRASOUND 5X72IN ANTI STATIC

## (undated) DEVICE — PROBE ES TEMP HOT AND CLD FAST ACCURATE SFT FLX CIRCA S CATH

## (undated) DEVICE — CABLE CONN TO CATH TO CARTO 3 --

## (undated) DEVICE — DRESSING HEMOSTATIC SFT INTVENT W/O SLT DBL WRP QUIKCLOT LF

## (undated) DEVICE — MEDI-VAC NON-CONDUCTIVE SUCTION TUBING: Brand: CARDINAL HEALTH

## (undated) DEVICE — CABLE CATH L10FT RED PIN CONN 34-34 FOR THERMOCOOL

## (undated) DEVICE — TORFLEX TRANSSEPTAL SHEATH; TRANSSEPTAL DILATOR; J-TIP GUIDEWIRE: Brand: TORFLEX TRANSSEPTAL GUIDING SHEATH

## (undated) DEVICE — REM POLYHESIVE ADULT PATIENT RETURN ELECTRODE: Brand: VALLEYLAB

## (undated) DEVICE — CATH EP MAP 2-6-2 7FR F CRV -- PENTARAY

## (undated) DEVICE — PADPRO DEFIBRILLATION/PACING/CARDIOVERSION/MONITORING ELECTRODES, ADULT/CHILD GREATER THAN 10 KG RADIOTRANSPARENT ELECTRODE, PHYSIO-CONTROL QUIK-COMBO (M) 60" (152 CM): Brand: PADPRO

## (undated) DEVICE — (D)PREP SKN CHLRAPRP APPL 26ML -- CONVERT TO ITEM 371833

## (undated) DEVICE — CATH BI DIR 7FR DEFL CS NON --

## (undated) DEVICE — CABLE CATH L2.7M CONNECTS TO CARTO 3 SYS PIU FOR LASSO ECO

## (undated) DEVICE — BAG TRASH WST 122 CM 183 CM 1400 CC FEMALE LUER PVC DEPOT

## (undated) DEVICE — BNDG ADHESIVE FABRIC 2X3.5IN -- CONVERT TO ITEM 357955

## (undated) DEVICE — CATHETER ABLAT 8FR L115CM 1-6-2MM SPC TIP 3.5MM FJ CRV

## (undated) DEVICE — PATCH CARTO 3 EXT REF --

## (undated) DEVICE — STERILE POLYISOPRENE POWDER-FREE SURGICAL GLOVES: Brand: PROTEXIS

## (undated) DEVICE — ARTHROSCOPY RICHMOND-LF: Brand: MEDLINE INDUSTRIES, INC.

## (undated) DEVICE — Device

## (undated) DEVICE — DYONICS 25 LUER-TO-COLDER ADAPTOR                                    INTELIJET CANNULA ADAPTOR, 12 PER BOX

## (undated) DEVICE — CATH EP MAP 2-6-2 7FR D CRV -- PENTARAY

## (undated) DEVICE — INFECTION CONTROL KIT SYS

## (undated) DEVICE — Device: Brand: PADPRO

## (undated) DEVICE — STERILE POLYISOPRENE POWDER-FREE SURGICAL GLOVES WITH EMOLLIENT COATING: Brand: PROTEXIS

## (undated) DEVICE — ABDOMINAL PAD: Brand: DERMACEA

## (undated) DEVICE — MEDI-TRACE CADENCE ADULT, DEFIBRILLATION ELECTRODE -RTS  (10 PR/PK) - PHYSIO-CONTROL: Brand: MEDI-TRACE CADENCE

## (undated) DEVICE — 10K/24K ARTHROSCOPY INFLOW TUBE SET: Brand: 10K/24K

## (undated) DEVICE — GOWN,SIRUS,NONRNF,SETINSLV,2XL,18/CS: Brand: MEDLINE

## (undated) DEVICE — MAT SUCT W36XL48IN FLD CTRL DISP

## (undated) DEVICE — 4-PORT MANIFOLD: Brand: NEPTUNE 2

## (undated) DEVICE — CATH BIDIR JCRV 8F 3.5X115MM -- THERMOCOOL SF

## (undated) DEVICE — HOOK LOCK LATEX FREE ELASTIC BANDAGE 4INX5YD

## (undated) DEVICE — THE DV8® RETRACTOR ESOPHAGEAL BALLOON (DV8 RETRACTOR) IS A NON-STERILE, SINGLE-USE, DISPOSABLE ESOPHAGEAL BALLOON RETRACTOR. THE DV8 RETRACTOR IS DEPLOYED ORALLY AND INFLATED INSIDE THE ESOPHAGUS. THE RETRACTOR GENTLY AND EFFECTIVELY RETRACTS THE ESOPHAGUS TO CREATE A STABLE OPERATING FIELD.: Brand: DV8® RETRACTOR

## (undated) DEVICE — 5.5 MM FULL RADIUS STRAIGHT                                    BLADES, POWER/EP-1, ORANGE, PACKAGED                                    6 PER BOX, STERILE

## (undated) DEVICE — TOWEL SURG W17XL27IN STD BLU COT NONFENESTRATED PREWASHED

## (undated) DEVICE — ZIMMER® STERILE DISPOSABLE TOURNIQUET CUFF WITH PROTECTIVE SLEEVE AND PLC, DUAL PORT, SINGLE BLADDER, 34 IN. (86 CM)

## (undated) DEVICE — SOLUTION IRRIG 3000ML LAC R FLX CONT

## (undated) DEVICE — 3M™ STERI-DRAPE™ U-DRAPE 1015: Brand: STERI-DRAPE™

## (undated) DEVICE — 3.5 MM FULL RADIUS STRAIGHT                                    BLADES, POWER/EP-1, BEIGE, PACKAGED                                    6 PER BOX, STERILE

## (undated) DEVICE — CABLE CATH L10FT YEL CONN 12-12 PIN ELECTROGRAM CONDUCTION

## (undated) DEVICE — SUT ETHLN 3-0 18IN PS2 BLK --